# Patient Record
Sex: FEMALE | Race: WHITE | NOT HISPANIC OR LATINO | Employment: OTHER | URBAN - METROPOLITAN AREA
[De-identification: names, ages, dates, MRNs, and addresses within clinical notes are randomized per-mention and may not be internally consistent; named-entity substitution may affect disease eponyms.]

---

## 2017-05-10 ENCOUNTER — TRANSCRIBE ORDERS (OUTPATIENT)
Dept: ADMINISTRATIVE | Facility: HOSPITAL | Age: 67
End: 2017-05-10

## 2017-05-10 DIAGNOSIS — R19.7 DIARRHEA, UNSPECIFIED TYPE: ICD-10-CM

## 2017-05-10 DIAGNOSIS — R10.84 ABDOMINAL PAIN, GENERALIZED: Primary | ICD-10-CM

## 2017-05-10 DIAGNOSIS — K62.5 RECTAL BLEEDING: ICD-10-CM

## 2017-05-19 ENCOUNTER — HOSPITAL ENCOUNTER (OUTPATIENT)
Dept: CT IMAGING | Facility: HOSPITAL | Age: 67
Discharge: HOME | End: 2017-05-19
Attending: INTERNAL MEDICINE | Admitting: INTERNAL MEDICINE

## 2017-05-19 ENCOUNTER — APPOINTMENT (OUTPATIENT)
Dept: CT IMAGING | Facility: HOSPITAL | Age: 67
End: 2017-05-19
Attending: INTERNAL MEDICINE

## 2017-05-19 DIAGNOSIS — R19.7 DIARRHEA, UNSPECIFIED TYPE: ICD-10-CM

## 2017-05-19 DIAGNOSIS — K62.5 RECTAL BLEEDING: ICD-10-CM

## 2017-05-19 DIAGNOSIS — R10.84 ABDOMINAL PAIN, GENERALIZED: ICD-10-CM

## 2017-05-19 PROCEDURE — 74176 CT ABD & PELVIS W/O CONTRAST: CPT

## 2017-05-19 PROCEDURE — 82565 ASSAY OF CREATININE: CPT

## 2017-05-23 LAB — CREAT BLDA-MCNC: 2.6 MG/DL (ref 0.6–1.3)

## 2018-05-06 ENCOUNTER — HOSPITAL ENCOUNTER (EMERGENCY)
Facility: HOSPITAL | Age: 68
Discharge: HOME | End: 2018-05-06
Attending: EMERGENCY MEDICINE | Admitting: EMERGENCY MEDICINE

## 2018-05-06 VITALS
SYSTOLIC BLOOD PRESSURE: 182 MMHG | OXYGEN SATURATION: 96 % | WEIGHT: 287 LBS | BODY MASS INDEX: 46.12 KG/M2 | RESPIRATION RATE: 18 BRPM | HEART RATE: 75 BPM | DIASTOLIC BLOOD PRESSURE: 73 MMHG | HEIGHT: 66 IN | TEMPERATURE: 98.2 F

## 2018-05-06 DIAGNOSIS — M54.31 SCIATICA OF RIGHT SIDE: Primary | ICD-10-CM

## 2018-05-06 PROCEDURE — 99283 EMERGENCY DEPT VISIT LOW MDM: CPT

## 2018-05-06 RX ORDER — SENNOSIDES 8.6 MG
650 CAPSULE ORAL 2 TIMES DAILY
COMMUNITY

## 2018-05-06 RX ORDER — METHOCARBAMOL 750 MG/1
750 TABLET, FILM COATED ORAL ONCE
Status: COMPLETED | OUTPATIENT
Start: 2018-05-06 | End: 2018-05-06

## 2018-05-06 RX ORDER — FAMOTIDINE 20 MG/1
20 TABLET, FILM COATED ORAL DAILY
COMMUNITY

## 2018-05-06 RX ORDER — LISINOPRIL AND HYDROCHLOROTHIAZIDE 25; 20 MG/1; MG/1
1 TABLET ORAL DAILY
COMMUNITY
End: 2019-02-25

## 2018-05-06 RX ORDER — INSULIN GLARGINE 100 [IU]/ML
10 INJECTION, SOLUTION SUBCUTANEOUS NIGHTLY
COMMUNITY
End: 2018-06-26 | Stop reason: SDUPTHER

## 2018-05-06 RX ORDER — METHOCARBAMOL 750 MG/1
750 TABLET, FILM COATED ORAL 3 TIMES DAILY
Qty: 15 TABLET | Refills: 0 | Status: SHIPPED | OUTPATIENT
Start: 2018-05-06 | End: 2018-05-11

## 2018-05-06 RX ORDER — OXYCODONE HYDROCHLORIDE AND ACETAMINOPHEN 5; 325 MG/1; MG/1
1 TABLET ORAL EVERY 4 HOURS PRN
Qty: 15 TABLET | Refills: 0 | Status: SHIPPED | OUTPATIENT
Start: 2018-05-06 | End: 2018-05-29

## 2018-05-06 RX ORDER — GABAPENTIN 100 MG/1
100 CAPSULE ORAL DAILY
COMMUNITY
End: 2018-05-29 | Stop reason: SINTOL

## 2018-05-06 RX ORDER — OXYCODONE HYDROCHLORIDE AND ACETAMINOPHEN 5; 325 MG/1; MG/1
1 TABLET ORAL ONCE
Status: COMPLETED | OUTPATIENT
Start: 2018-05-06 | End: 2018-05-06

## 2018-05-06 RX ADMIN — OXYCODONE HYDROCHLORIDE AND ACETAMINOPHEN 1 TABLET: 5; 325 TABLET ORAL at 05:22

## 2018-05-06 RX ADMIN — METHOCARBAMOL 750 MG: 750 TABLET ORAL at 05:22

## 2018-05-06 NOTE — DISCHARGE INSTRUCTIONS
Patient will be advised to apply heat to low back and perform stretching, and regular walking.     Take robaxin and percocet as needed for muscle spasm and pain.     Take ibuprofen 600 mg every 6 hours for next 3 days.     Follow-up with Dr. Schaffer on outpatient basis.

## 2018-05-06 NOTE — ED PROVIDER NOTES
Subjective   67-year-old female presents with a complaint of right lower back pain with radiation of pain down the posterior leg to the foot.  She reports tingling/numbness in the right medial foot.  She reports her strength of her right lower extremity has been intact and unchanged from baseline.   In fact she states that standing and ambulating has helped to improve the pain at times.  She reports movement and lying flat affect worsen the pain time.  She denies any bowel or urinary incontinence or retention.  No recent trauma to the back.  No previous surgery to the low back.  No recent fever, no history of cancer or unexpected weight change.  Reports a previous history of sciatica in the past.  No other complaints.  Patient appears well.        Back Pain   Location:  Lumbar spine  Quality:  Aching  Radiates to:  R foot  Pain severity:  Moderate  Pain is:  Same all the time  Onset quality:  Sudden  Duration:  1 week  Timing:  Intermittent  Progression:  Waxing and waning  Chronicity:  Recurrent  Context: not falling, not lifting heavy objects, not physical stress, not recent illness and not recent injury    Relieved by: walking, certain positions.  Worsened by:  Bending, movement and lying down  Ineffective treatments:  None tried  Associated symptoms: leg pain and tingling    Associated symptoms: no abdominal pain, no abdominal swelling, no bladder incontinence, no bowel incontinence, no chest pain, no dysuria, no fever, no headaches, no paresthesias, no perianal numbness, no weakness and no weight loss    Risk factors: lack of exercise and obesity    Risk factors: no hx of cancer and no recent surgery        Review of Systems   Constitutional: Negative for chills, fatigue, fever and weight loss.   HENT: Negative for congestion, ear pain, postnasal drip, sinus pressure and sore throat.    Eyes: Negative for pain, redness and visual disturbance.   Respiratory: Negative for cough, chest tightness and shortness of  "breath.    Cardiovascular: Negative for chest pain, palpitations and leg swelling.   Gastrointestinal: Negative for abdominal pain, anal bleeding, blood in stool, bowel incontinence, diarrhea, nausea and vomiting.   Endocrine: Negative for polydipsia and polyuria.   Genitourinary: Negative for bladder incontinence, difficulty urinating, dysuria, flank pain, frequency and urgency.   Musculoskeletal: Positive for back pain. Negative for arthralgias and neck pain.   Skin: Negative for pallor and rash.   Allergic/Immunologic: Negative for environmental allergies and immunocompromised state.   Neurological: Positive for tingling. Negative for dizziness, weakness, headaches and paresthesias.   Hematological: Negative for adenopathy.   Psychiatric/Behavioral: Negative for confusion, self-injury and suicidal ideas. The patient is not nervous/anxious.    All other systems reviewed and are negative.      Past Medical History:   Diagnosis Date   • Anemia    • Chronic pain in left shoulder    • Diabetes mellitus    • Gout    • Heart disease    • Hyperlipidemia    • Hypertension    • IBS (irritable bowel syndrome)    • Kidney stone    • Obesity    • PHYLLIS on CPAP    • Osteoarthritis of right elbow    • Osteoporosis    • Primary osteoarthritis of left knee    • Rotator cuff tear    • SLE (systemic lupus erythematosus)    • SOB (shortness of breath)        Allergies   Allergen Reactions   • Duract [Bromfenac] Hives   • Duricef [Cefadroxil] Hives   • Erythromycin Hives   • Lamisil [Terbinafine Hcl] Other (See Comments)     \"IT CAUSED LUPUS\"   • Lasix [Furosemide] Other (See Comments)     PT STATES \"IM JUST VERY SENSITIVE TO IT.\"   • Mevacor [Lovastatin] Other (See Comments)     PT REPORTS \"IT JUST MADE ME FEEL SICK, I COULDN'T TOLERATE IT NOTHING SPECIFIC.\"   • Other Other (See Comments)     LAMOSIL CAUSED DRUG INDUCED LUPUS PER PATIENT   • Sulfa Antibiotics Hives   • Sulfanilamide    • Zocor [Simvastatin] Other (See Comments)     " "\"IT JUST MADE ME FEEL SICK, I COULDN'T TOLERATE IT. NOTHING SPECIFIC.\"         Past Surgical History:   Procedure Laterality Date   • APPENDECTOMY     • CATARACT EXTRACTION     • CHOLECYSTECTOMY     • HYSTERECTOMY         Family History   Problem Relation Age of Onset   • Stroke Mother    • Heart disease Mother    • Hypertension Mother    • Heart attack Mother    • Deep vein thrombosis Mother    • Osteoarthritis Mother    • Stroke Father    • Heart disease Father    • Hypertension Father    • Heart attack Father    • Cancer Other    • Diabetes Other    • Heart disease Other    • Hypertension Other    • Heart disease Other    • Hypertension Other    • Heart attack Other        Social History     Social History   • Marital status:      Social History Main Topics   • Smoking status: Never Smoker   • Smokeless tobacco: Never Used   • Alcohol use No   • Drug use: No   • Sexual activity: Defer     Other Topics Concern   • Not on file           Objective   Physical Exam   Constitutional: She is oriented to person, place, and time. She appears well-developed and well-nourished.  Non-toxic appearance. No distress.   HENT:   Head: Normocephalic and atraumatic.   Right Ear: External ear normal.   Left Ear: External ear normal.   Nose: Nose normal.   Eyes: EOM and lids are normal. Pupils are equal, round, and reactive to light.   Neck: Normal range of motion. Neck supple. No tracheal deviation present.   Cardiovascular: Normal rate, regular rhythm and normal heart sounds.  Exam reveals no gallop, no friction rub and no decreased pulses.    No murmur heard.  Pulmonary/Chest: Effort normal and breath sounds normal. No respiratory distress. She has no decreased breath sounds. She has no wheezes. She has no rhonchi. She has no rales.   Abdominal: Soft. Normal appearance and bowel sounds are normal. There is no tenderness. There is no rebound and no guarding.   Musculoskeletal: Normal range of motion. She exhibits no " deformity.        Lumbar back: She exhibits tenderness and pain. She exhibits no bony tenderness, no swelling and no deformity.        Back:    Lymphadenopathy:     She has no cervical adenopathy.   Neurological: She is alert and oriented to person, place, and time. She has normal strength. No cranial nerve deficit or sensory deficit.   Skin: Skin is warm and dry. No rash noted. She is not diaphoretic.   Psychiatric: She has a normal mood and affect. Her speech is normal and behavior is normal. Judgment and thought content normal. Cognition and memory are normal.   Nursing note and vitals reviewed.      Procedures           ED Course  ED Course                  MDM  Number of Diagnoses or Management Options  Sciatica of right side: new and requires workup  Diagnosis management comments: Patient will be discharged with advise to apply heat, perform regular stretching, light activity such as walking, and avoid heavy lifting or strenuous activity.     Will DC with robaxin and percocet for pain.      Patient advised to follow-up with PCP for repeat evaluation in 1 week.        Amount and/or Complexity of Data Reviewed  Obtain history from someone other than the patient: yes  Review and summarize past medical records: yes  Independent visualization of images, tracings, or specimens: yes    Patient Progress  Patient progress: stable        Final diagnoses:   Sciatica of right side            Amy Darden MD  05/06/18 0700

## 2018-05-27 ENCOUNTER — APPOINTMENT (OUTPATIENT)
Dept: GENERAL RADIOLOGY | Facility: HOSPITAL | Age: 68
End: 2018-05-27

## 2018-05-27 ENCOUNTER — HOSPITAL ENCOUNTER (EMERGENCY)
Facility: HOSPITAL | Age: 68
Discharge: HOME | End: 2018-05-27
Attending: EMERGENCY MEDICINE | Admitting: EMERGENCY MEDICINE

## 2018-05-27 VITALS
HEIGHT: 66 IN | OXYGEN SATURATION: 90 % | DIASTOLIC BLOOD PRESSURE: 69 MMHG | TEMPERATURE: 98.2 F | RESPIRATION RATE: 18 BRPM | BODY MASS INDEX: 45.48 KG/M2 | SYSTOLIC BLOOD PRESSURE: 151 MMHG | HEART RATE: 81 BPM | WEIGHT: 283 LBS

## 2018-05-27 DIAGNOSIS — R06.09 DYSPNEA ON EFFORT: Primary | ICD-10-CM

## 2018-05-27 DIAGNOSIS — N28.9 RENAL INSUFFICIENCY: ICD-10-CM

## 2018-05-27 LAB
ALBUMIN SERPL-MCNC: 4.5 G/DL (ref 3.2–4.8)
ALBUMIN/GLOB SERPL: 1.5 G/DL (ref 1.5–2.5)
ALP SERPL-CCNC: 33 U/L (ref 25–100)
ALT SERPL W P-5'-P-CCNC: 16 U/L (ref 7–40)
ANION GAP SERPL CALCULATED.3IONS-SCNC: 10 MMOL/L (ref 3–11)
AST SERPL-CCNC: 24 U/L (ref 0–33)
BASOPHILS # BLD AUTO: 0.02 10*3/MM3 (ref 0–0.2)
BASOPHILS NFR BLD AUTO: 0.3 % (ref 0–1)
BILIRUB SERPL-MCNC: 0.4 MG/DL (ref 0.3–1.2)
BNP SERPL-MCNC: 27 PG/ML (ref 0–100)
BUN BLD-MCNC: 42 MG/DL (ref 9–23)
BUN/CREAT SERPL: 23.3 (ref 7–25)
CALCIUM SPEC-SCNC: 10.7 MG/DL (ref 8.7–10.4)
CHLORIDE SERPL-SCNC: 104 MMOL/L (ref 99–109)
CO2 SERPL-SCNC: 26 MMOL/L (ref 20–31)
CREAT BLD-MCNC: 1.8 MG/DL (ref 0.6–1.3)
DEPRECATED RDW RBC AUTO: 44 FL (ref 37–54)
EOSINOPHIL # BLD AUTO: 0.09 10*3/MM3 (ref 0–0.3)
EOSINOPHIL NFR BLD AUTO: 1.2 % (ref 0–3)
ERYTHROCYTE [DISTWIDTH] IN BLOOD BY AUTOMATED COUNT: 13 % (ref 11.3–14.5)
GFR SERPL CREATININE-BSD FRML MDRD: 28 ML/MIN/1.73
GLOBULIN UR ELPH-MCNC: 3 GM/DL
GLUCOSE BLD-MCNC: 160 MG/DL (ref 70–100)
HCT VFR BLD AUTO: 35.2 % (ref 34.5–44)
HGB BLD-MCNC: 11.4 G/DL (ref 11.5–15.5)
HOLD SPECIMEN: NORMAL
HOLD SPECIMEN: NORMAL
IMM GRANULOCYTES # BLD: 0.05 10*3/MM3 (ref 0–0.03)
IMM GRANULOCYTES NFR BLD: 0.7 % (ref 0–0.6)
LYMPHOCYTES # BLD AUTO: 1.25 10*3/MM3 (ref 0.6–4.8)
LYMPHOCYTES NFR BLD AUTO: 16.8 % (ref 24–44)
MCH RBC QN AUTO: 30.3 PG (ref 27–31)
MCHC RBC AUTO-ENTMCNC: 32.4 G/DL (ref 32–36)
MCV RBC AUTO: 93.6 FL (ref 80–99)
MONOCYTES # BLD AUTO: 0.44 10*3/MM3 (ref 0–1)
MONOCYTES NFR BLD AUTO: 5.9 % (ref 0–12)
NEUTROPHILS # BLD AUTO: 5.64 10*3/MM3 (ref 1.5–8.3)
NEUTROPHILS NFR BLD AUTO: 75.8 % (ref 41–71)
PLATELET # BLD AUTO: 231 10*3/MM3 (ref 150–450)
PMV BLD AUTO: 11 FL (ref 6–12)
POTASSIUM BLD-SCNC: 4.2 MMOL/L (ref 3.5–5.5)
PROT SERPL-MCNC: 7.5 G/DL (ref 5.7–8.2)
RBC # BLD AUTO: 3.76 10*6/MM3 (ref 3.89–5.14)
SODIUM BLD-SCNC: 140 MMOL/L (ref 132–146)
TROPONIN I SERPL-MCNC: 0 NG/ML (ref 0–0.07)
WBC NRBC COR # BLD: 7.44 10*3/MM3 (ref 3.5–10.8)
WHOLE BLOOD HOLD SPECIMEN: NORMAL
WHOLE BLOOD HOLD SPECIMEN: NORMAL

## 2018-05-27 PROCEDURE — 80053 COMPREHEN METABOLIC PANEL: CPT | Performed by: EMERGENCY MEDICINE

## 2018-05-27 PROCEDURE — 99284 EMERGENCY DEPT VISIT MOD MDM: CPT

## 2018-05-27 PROCEDURE — 84484 ASSAY OF TROPONIN QUANT: CPT

## 2018-05-27 PROCEDURE — 93005 ELECTROCARDIOGRAM TRACING: CPT | Performed by: EMERGENCY MEDICINE

## 2018-05-27 PROCEDURE — 85025 COMPLETE CBC W/AUTO DIFF WBC: CPT | Performed by: EMERGENCY MEDICINE

## 2018-05-27 PROCEDURE — 83880 ASSAY OF NATRIURETIC PEPTIDE: CPT | Performed by: EMERGENCY MEDICINE

## 2018-05-27 PROCEDURE — 71045 X-RAY EXAM CHEST 1 VIEW: CPT

## 2018-05-27 RX ORDER — SODIUM CHLORIDE 0.9 % (FLUSH) 0.9 %
10 SYRINGE (ML) INJECTION AS NEEDED
Status: DISCONTINUED | OUTPATIENT
Start: 2018-05-27 | End: 2018-05-27 | Stop reason: HOSPADM

## 2018-05-29 ENCOUNTER — DOCUMENTATION (OUTPATIENT)
Dept: NEUROSURGERY | Facility: CLINIC | Age: 68
End: 2018-05-29

## 2018-05-29 ENCOUNTER — OFFICE VISIT (OUTPATIENT)
Dept: NEUROSURGERY | Facility: CLINIC | Age: 68
End: 2018-05-29

## 2018-05-29 VITALS
TEMPERATURE: 98.6 F | DIASTOLIC BLOOD PRESSURE: 70 MMHG | HEIGHT: 66 IN | WEIGHT: 287 LBS | SYSTOLIC BLOOD PRESSURE: 138 MMHG | BODY MASS INDEX: 46.12 KG/M2

## 2018-05-29 DIAGNOSIS — M53.3 CHRONIC RIGHT SI JOINT PAIN: ICD-10-CM

## 2018-05-29 DIAGNOSIS — G89.29 CHRONIC RIGHT SI JOINT PAIN: ICD-10-CM

## 2018-05-29 DIAGNOSIS — M79.604 RIGHT LEG PAIN: Primary | ICD-10-CM

## 2018-05-29 DIAGNOSIS — M79.671 RIGHT FOOT PAIN: ICD-10-CM

## 2018-05-29 DIAGNOSIS — IMO0001 CLASS 3 OBESITY WITH SERIOUS COMORBIDITY AND BODY MASS INDEX (BMI) OF 45.0 TO 49.9 IN ADULT, UNSPECIFIED OBESITY TYPE: ICD-10-CM

## 2018-05-29 DIAGNOSIS — E11.49 TYPE 2 DIABETES MELLITUS WITH OTHER NEUROLOGIC COMPLICATION, WITHOUT LONG-TERM CURRENT USE OF INSULIN (HCC): ICD-10-CM

## 2018-05-29 DIAGNOSIS — M81.0 OSTEOPOROSIS WITHOUT CURRENT PATHOLOGICAL FRACTURE, UNSPECIFIED OSTEOPOROSIS TYPE: ICD-10-CM

## 2018-05-29 PROBLEM — E11.9 DIABETES MELLITUS: Status: ACTIVE | Noted: 2018-05-29

## 2018-05-29 PROBLEM — M10.9 GOUT: Status: ACTIVE | Noted: 2018-05-29

## 2018-05-29 PROBLEM — E66.9 OBESITY: Status: ACTIVE | Noted: 2018-05-29

## 2018-05-29 PROCEDURE — 99203 OFFICE O/P NEW LOW 30 MIN: CPT | Performed by: PHYSICIAN ASSISTANT

## 2018-05-29 RX ORDER — CYCLOBENZAPRINE HCL 10 MG
10 TABLET ORAL 3 TIMES DAILY PRN
COMMUNITY

## 2018-05-29 RX ORDER — PREGABALIN 75 MG/1
75 CAPSULE ORAL 2 TIMES DAILY
COMMUNITY
End: 2018-07-19 | Stop reason: SDUPTHER

## 2018-05-29 NOTE — PROGRESS NOTES
Subjective   Patient: Lissett Lowry  : 1950  Chart #: [id number]    Date of Service: 2018    Chief Complaint   Patient presents with   • Back Pain   • Leg Pain     HPI  66 yo WF with multiple co-morbidities presentedTo the emergency department on 2018 with complaints of back pain radiating down the posterior right leg into the foot. No studies were performed, she was send home with stretching excises, Robaxin and Percocet and instructed on heat application to the right SI joint area.  She was to follow up with her primary care physician in one week.  She presents to our office today with ongoing symptoms that have changed in nature.  She reports that the pain down her leg has improved significantly since her visit to the emergency room on 2018.  Most of her pain now is located on the medial aspect of the bottom of her right foot it involves a portion of her heel arch and the base of her great toe and into the great toe.  She describes numbness and tingling into the foot and especially the right great toe, she offers symptoms of coldness and and burning.  Her symptoms are worse if she does not have on a shoe.  She occasionally gets some twitching into the right foot, she has been taking Flexeril 10 mg half to 1 at bedtime and reports that this has helped her symptoms.  She denies any weakness in the leg, denies bladder or bowel problems.  Walking and standing make symptoms worse, sitting and laying improves her symptoms.  She has recently been seen back in the emergency room on 2018 with shortness of breath and dyspnea on exertion, she is currently awaiting follow-up with cardiology for stress testing. Her activities are very sedentary especially now with the SOB/CURRIE. Patient reports her glucoses at home are on the average of 140 to 150, she is unsure of her hemoglobin A1c.  She does have severe chronic renal dysfunction stage III or 4, the patient is unsure.  The patient reports that she  "has not had much of an appetite lately and has lost 10 pounds according to her home scale.  Patient does have a history of gout.    Past Medical History:   Diagnosis Date   • Anemia    • Chronic pain in left shoulder    • Diabetes mellitus    • Gout    • Heart disease    • Hyperlipidemia    • Hypertension    • IBS (irritable bowel syndrome)    • Kidney stone    • Obesity    • PHYLLIS on CPAP    • Osteoarthritis of right elbow    • Osteoporosis    • Primary osteoarthritis of left knee    • Rotator cuff tear    • SLE (systemic lupus erythematosus)    • SOB (shortness of breath)        Allergies   Allergen Reactions   • Duract [Bromfenac] Hives   • Duricef [Cefadroxil] Hives   • Erythromycin Hives   • Lamisil [Terbinafine Hcl] Other (See Comments)     \"IT CAUSED LUPUS\"   • Lasix [Furosemide] Other (See Comments)     PT STATES \"IM JUST VERY SENSITIVE TO IT.\"   • Mevacor [Lovastatin] Other (See Comments)     PT REPORTS \"IT JUST MADE ME FEEL SICK, I COULDN'T TOLERATE IT NOTHING SPECIFIC.\"   • Other Other (See Comments)     LAMOSIL CAUSED DRUG INDUCED LUPUS PER PATIENT   • Sulfa Antibiotics Hives   • Sulfanilamide    • Zocor [Simvastatin] Other (See Comments)     \"IT JUST MADE ME FEEL SICK, I COULDN'T TOLERATE IT. NOTHING SPECIFIC.\"           Current Outpatient Prescriptions:   •  acetaminophen (TYLENOL 8 HOUR ARTHRITIS PAIN) 650 MG 8 hr tablet, Take 650 mg by mouth Every 8 (Eight) Hours As Needed for Mild Pain ., Disp: , Rfl:   •  AMLODIPINE BESYLATE PO, Take 5 mg by mouth Daily., Disp: , Rfl:   •  BH IP RX LISINOPRIL/HCTZ COMBO PANEL, , Disp: , Rfl:   •  Bumetanide (BUMEX PO), Take 1 mg by mouth 2 (Two) Times a Day. PT STATES SHE FORGETS TO TAKE IT TWICE, USUALLY TAKES IT ONLY ONCE DAILY, Disp: , Rfl:   •  Exenatide ER (BYDUREON) 2 MG Suspension Reconstituted ER, Inject  under the skin 1 (One) Time Per Week., Disp: , Rfl:   •  famotidine (PEPCID) 20 MG tablet, Take 20 mg by mouth Daily., Disp: , Rfl:   •  fenofibrate " micronized (LOFIBRA) 134 MG capsule, Take  by mouth Daily., Disp: , Rfl:   •  gabapentin (NEURONTIN) 100 MG capsule, Take 100 mg by mouth Daily., Disp: , Rfl:   •  insulin glargine (LANTUS) 100 UNIT/ML injection, Inject 10 Units under the skin Every Night., Disp: , Rfl:   •  linagliptin (TRADJENTA) 5 MG tablet tablet, Take  by mouth Daily., Disp: , Rfl:   •  lisinopril-hydrochlorothiazide (PRINZIDE,ZESTORETIC) 20-25 MG per tablet, Take 1 tablet by mouth Daily., Disp: , Rfl:   •  Meloxicam (MOBIC PO), Take 7.5 mg by mouth As Needed., Disp: , Rfl:   •  metoprolol tartrate (LOPRESSOR) 25 MG tablet, Take 25 mg by mouth Daily., Disp: , Rfl:   •  pioglitazone (ACTOS) 45 MG tablet, Take  by mouth Daily., Disp: , Rfl:   •  rosuvastatin (CRESTOR) 5 MG tablet, Take  by mouth Daily., Disp: , Rfl:   •  sertraline (ZOLOFT) 100 MG tablet, Take  by mouth Daily., Disp: , Rfl:   •  triamterene-hydrochlorothiazide (DYAZIDE) 37.5-25 MG per capsule, Take  by mouth Take As Directed., Disp: , Rfl:   •  spironolactone (ALDACTONE) 25 MG tablet, Take  by mouth Take As Directed., Disp: , Rfl:     Social History     Social History   • Marital status:      Social History Main Topics   • Smoking status: Never Smoker   • Smokeless tobacco: Never Used   • Alcohol use No   • Drug use: No   • Sexual activity: Defer     Other Topics Concern   • Not on file       Family History   Problem Relation Age of Onset   • Stroke Mother    • Heart disease Mother    • Hypertension Mother    • Heart attack Mother    • Deep vein thrombosis Mother    • Osteoarthritis Mother    • Stroke Father    • Heart disease Father    • Hypertension Father    • Heart attack Father    • Cancer Other    • Diabetes Other    • Heart disease Other    • Hypertension Other    • Heart disease Other    • Hypertension Other    • Heart attack Other        Review of Systems   Constitutional: Positive for fatigue. Negative for activity change, appetite change, chills, diaphoresis,  fever and unexpected weight change.   HENT: Negative for congestion, dental problem, drooling, ear discharge, ear pain, facial swelling, hearing loss, mouth sores, nosebleeds, postnasal drip, rhinorrhea, sinus pressure, sneezing, sore throat, tinnitus, trouble swallowing and voice change.    Eyes: Negative for photophobia, pain, discharge, redness, itching and visual disturbance.   Respiratory: Positive for apnea and shortness of breath. Negative for cough, choking, chest tightness, wheezing and stridor.    Cardiovascular: Negative for chest pain, palpitations and leg swelling.   Gastrointestinal: Negative for abdominal distention, abdominal pain, anal bleeding, blood in stool, constipation, diarrhea, nausea, rectal pain and vomiting.   Endocrine: Negative for cold intolerance, heat intolerance, polydipsia, polyphagia and polyuria.   Genitourinary: Negative for decreased urine volume, difficulty urinating, dysuria, enuresis, flank pain, frequency, genital sores, hematuria and urgency.   Musculoskeletal: Positive for arthralgias and gait problem. Negative for back pain, joint swelling, myalgias, neck pain and neck stiffness.   Skin: Negative for color change, pallor, rash and wound.   Allergic/Immunologic: Negative for environmental allergies, food allergies and immunocompromised state.   Neurological: Positive for weakness and numbness. Negative for dizziness, tremors, seizures, syncope, facial asymmetry, speech difficulty, light-headedness and headaches.   Hematological: Negative for adenopathy. Does not bruise/bleed easily.   Psychiatric/Behavioral: Positive for dysphoric mood. Negative for agitation, behavioral problems, confusion, decreased concentration, hallucinations, self-injury, sleep disturbance and suicidal ideas. The patient is not nervous/anxious and is not hyperactive.          Physical examination:  Blood pressure 138/70, temperature 98.6 °F (37 °C), temperature source Temporal Artery , height 167.6  "cm (66\"), weight 130 kg (287 lb).  HEENT: sclera clear, gums clear  Lungs: normal expansion  COR: RRR  ABD: obese  EXT: 1+ edema bilaterally, I am unable to palpate pulses, feet are warm. Capillary refill +    Neurologic Exam     Mental Status   Oriented to person, place, and time.   Attention: normal.   Speech: speech is normal   Level of consciousness: alert  Knowledge: good.     Motor Exam   Muscle bulk: normal  Overall muscle tone: normal    Strength   Right quadriceps: 4/5  Left quadriceps: 4/5  Right hamstrin/5  Left hamstrin/5  Right glutei: 4/5  Left glutei: 4/5  Right anterior tibial: 4/5  Left anterior tibial: 4/5  Right posterior tibial: 4/5  Left posterior tibial: 4/5  Right peroneal: 4/5  Left peroneal: 4/5  Right gastroc: 4/5  Left gastroc: 4/5    Sensory Exam   Light touch normal.     Gait, Coordination, and Reflexes     Gait  Gait: normal    Reflexes   Right patellar: 1+  Left patellar: 1+  Right achilles: 0  Left achilles: 0  SLR is -  Hip rotation causes some right SI joint discomfort.  The right great toe  is not swollen or painful with range of motion.    Radiographic Imaging: no new studies    Laboratory data: reviewed Epic data from ED visit    Medical Decision Making: pleasant 67-year-old female with long-standing diabetes, chronic renal disease, hypertension, hyperlipidemia, obesity, osteoarthritis, osteoporosis, lupus, now recently being worked up for heart disease and shortness of breath and dyspnea on exertion with walking short distances.  She is scheduled to see cardiology for stress testing in the near future.  The patient has had an acute onset of low back pain that radiated down the right leg and has now remained in the bottom of the right foot and into the right great toe.  The symptoms with which she presents could be related to SI joint dysfunction and her right great toe symptoms may be an exacerbation of her previous gout.  With her long-standing diabetes peripheral " neuropathy would need to be in the differential.  We are going to proceed with EMG and nerve conduction study of the right lower extremity and she will return to see us after that is completed.  We will wait until those results until making any further recommendations at this time.  The patient is currently on Neurontin 100 mg and is having some difficulties with this dose of sleepiness, she did take a 300 mg dose and had confusion and sleepiness.  We have recommended switching her to Lyrica 75 mg twice a day and she and her spouse are both in agreement.  They're also agreeing with plan for nerve conduction study.    Amy Orr PA-C    Patient Care Team:  Konstantin Gibson MD as PCP - General (Family Medicine)  Konstantin Gibson MD as Referring Physician (Family Medicine)

## 2018-06-19 ENCOUNTER — OFFICE VISIT (OUTPATIENT)
Dept: NEUROSURGERY | Facility: CLINIC | Age: 68
End: 2018-06-19

## 2018-06-19 VITALS
SYSTOLIC BLOOD PRESSURE: 140 MMHG | WEIGHT: 293 LBS | TEMPERATURE: 99.4 F | DIASTOLIC BLOOD PRESSURE: 80 MMHG | BODY MASS INDEX: 47.09 KG/M2 | HEIGHT: 66 IN

## 2018-06-19 DIAGNOSIS — M51.36 DDD (DEGENERATIVE DISC DISEASE), LUMBAR: Primary | ICD-10-CM

## 2018-06-19 DIAGNOSIS — E11.49 TYPE 2 DIABETES MELLITUS WITH OTHER NEUROLOGIC COMPLICATION, WITHOUT LONG-TERM CURRENT USE OF INSULIN (HCC): ICD-10-CM

## 2018-06-19 DIAGNOSIS — M54.50 CHRONIC BILATERAL LOW BACK PAIN WITHOUT SCIATICA: ICD-10-CM

## 2018-06-19 DIAGNOSIS — M79.671 RIGHT FOOT PAIN: ICD-10-CM

## 2018-06-19 DIAGNOSIS — Z79.4 TYPE 2 DIABETES MELLITUS WITH OTHER DIABETIC KIDNEY COMPLICATION, WITH LONG-TERM CURRENT USE OF INSULIN (HCC): ICD-10-CM

## 2018-06-19 DIAGNOSIS — G89.29 CHRONIC BILATERAL LOW BACK PAIN WITHOUT SCIATICA: ICD-10-CM

## 2018-06-19 DIAGNOSIS — M53.3 CHRONIC RIGHT SI JOINT PAIN: ICD-10-CM

## 2018-06-19 DIAGNOSIS — G89.29 CHRONIC RIGHT SI JOINT PAIN: ICD-10-CM

## 2018-06-19 DIAGNOSIS — M79.604 RIGHT LEG PAIN: ICD-10-CM

## 2018-06-19 DIAGNOSIS — IMO0001 CLASS 3 OBESITY WITH SERIOUS COMORBIDITY AND BODY MASS INDEX (BMI) OF 45.0 TO 49.9 IN ADULT, UNSPECIFIED OBESITY TYPE: ICD-10-CM

## 2018-06-19 DIAGNOSIS — M81.0 OSTEOPOROSIS WITHOUT CURRENT PATHOLOGICAL FRACTURE, UNSPECIFIED OSTEOPOROSIS TYPE: ICD-10-CM

## 2018-06-19 DIAGNOSIS — E11.29 TYPE 2 DIABETES MELLITUS WITH OTHER DIABETIC KIDNEY COMPLICATION, WITH LONG-TERM CURRENT USE OF INSULIN (HCC): ICD-10-CM

## 2018-06-19 PROCEDURE — 99214 OFFICE O/P EST MOD 30 MIN: CPT | Performed by: PHYSICIAN ASSISTANT

## 2018-06-19 NOTE — PROGRESS NOTES
F/U Visit  Subjective   Patient ID: Lissett Lowry is a 67 y.o. female  [id number]    Chief Complaint   Patient presents with   • Leg Pain     History of Present Illness  68 yo WF with multiple co-morbidities presentedTo the emergency department on 5/6/2018 with complaints of back pain radiating down the posterior right leg into the foot. No studies were performed, she was send home with stretching excises, Robaxin and Percocet and instructed on heat application to the right SI joint area.  She was to follow up with her primary care physician in one week.  She presents to our office today with ongoing symptoms that have changed in nature.  She reports that the pain down her leg has improved significantly since her visit to the emergency room on 5/6/2018.  Most of her pain now is located on the medial aspect of the bottom of her right foot it involves a portion of her heel arch and the base of her great toe and into the great toe.  She describes numbness and tingling into the foot and especially the right great toe, she offers symptoms of coldness and and burning.  Her symptoms are worse if she does not have on a shoe.  She occasionally gets some twitching into the right foot, she has been taking Flexeril 10 mg half to 1 at bedtime and reports that this has helped her symptoms.  She denies any weakness in the leg, denies bladder or bowel problems.  Walking and standing make symptoms worse, sitting and laying improves her symptoms.  She has recently been seen back in the emergency room on 5/27/2018 with shortness of breath and dyspnea on exertion, she is currently awaiting follow-up with cardiology for stress testing. Her activities are very sedentary especially now with the SOB/CURRIE. Patient reports her glucoses at home are on the average of 140 to 150, she is unsure of her hemoglobin A1c.  She does have severe chronic renal dysfunction stage III or 4, the patient is unsure.   She has been and had her stress test by  "cardiology which was normal.   In addition to updating the above complaints she is also complaining of memory problems in name recognition,    Confusion, forgetful, dry mouth and increased appetite.  She and her  started discussing her diabetes treatment and they both feel she needs diabetes education and referral to endocrinology.  She's had some complaints of fullness in her head and her primary care obtained a CT scan of the head which  She brought for review.    Past Medical History:   Diagnosis Date   • Anemia    • Chronic pain in left shoulder    • Diabetes mellitus    • Gout    • Heart disease    • Hyperlipidemia    • Hypertension    • IBS (irritable bowel syndrome)    • Kidney stone    • Obesity    • PHYLLIS on CPAP    • Osteoarthritis of right elbow    • Osteoporosis    • Primary osteoarthritis of left knee    • Rotator cuff tear    • SLE (systemic lupus erythematosus)    • SOB (shortness of breath)        Allergies   Allergen Reactions   • Duract [Bromfenac] Hives   • Duricef [Cefadroxil] Hives   • Erythromycin Hives   • Lamisil [Terbinafine Hcl] Other (See Comments)     \"IT CAUSED LUPUS\"   • Lasix [Furosemide] Other (See Comments)     PT STATES \"IM JUST VERY SENSITIVE TO IT.\"   • Mevacor [Lovastatin] Other (See Comments)     PT REPORTS \"IT JUST MADE ME FEEL SICK, I COULDN'T TOLERATE IT NOTHING SPECIFIC.\"   • Other Other (See Comments)     LAMOSIL CAUSED DRUG INDUCED LUPUS PER PATIENT   • Sulfa Antibiotics Hives   • Sulfanilamide    • Zocor [Simvastatin] Other (See Comments)     \"IT JUST MADE ME FEEL SICK, I COULDN'T TOLERATE IT. NOTHING SPECIFIC.\"         Current Outpatient Prescriptions:   •  acetaminophen (TYLENOL 8 HOUR ARTHRITIS PAIN) 650 MG 8 hr tablet, Take 650 mg by mouth Every 8 (Eight) Hours As Needed for Mild Pain ., Disp: , Rfl:   •  AMLODIPINE BESYLATE PO, Take 5 mg by mouth Daily., Disp: , Rfl:   •  BH IP RX LISINOPRIL/HCTZ COMBO PANEL, , Disp: , Rfl:   •  Bumetanide (BUMEX PO), Take 1 mg " by mouth 2 (Two) Times a Day. PT STATES SHE FORGETS TO TAKE IT TWICE, USUALLY TAKES IT ONLY ONCE DAILY, Disp: , Rfl:   •  cyclobenzaprine (FLEXERIL) 10 MG tablet, Take 10 mg by mouth 3 (Three) Times a Day As Needed for Muscle Spasms., Disp: , Rfl:   •  Exenatide ER (BYDUREON) 2 MG Suspension Reconstituted ER, Inject  under the skin 1 (One) Time Per Week., Disp: , Rfl:   •  famotidine (PEPCID) 20 MG tablet, Take 20 mg by mouth Daily., Disp: , Rfl:   •  fenofibrate micronized (LOFIBRA) 134 MG capsule, Take  by mouth Daily., Disp: , Rfl:   •  insulin glargine (LANTUS) 100 UNIT/ML injection, Inject 10 Units under the skin Every Night., Disp: , Rfl:   •  linagliptin (TRADJENTA) 5 MG tablet tablet, Take  by mouth Daily., Disp: , Rfl:   •  lisinopril-hydrochlorothiazide (PRINZIDE,ZESTORETIC) 20-25 MG per tablet, Take 1 tablet by mouth Daily., Disp: , Rfl:   •  Meloxicam (MOBIC PO), Take 7.5 mg by mouth As Needed., Disp: , Rfl:   •  metoprolol tartrate (LOPRESSOR) 25 MG tablet, Take 25 mg by mouth Daily., Disp: , Rfl:   •  pioglitazone (ACTOS) 45 MG tablet, Take  by mouth Daily., Disp: , Rfl:   •  pregabalin (LYRICA) 75 MG capsule, Take 75 mg by mouth 2 (Two) Times a Day., Disp: , Rfl:   •  rosuvastatin (CRESTOR) 5 MG tablet, Take  by mouth Daily., Disp: , Rfl:   •  sertraline (ZOLOFT) 100 MG tablet, Take  by mouth Daily., Disp: , Rfl:   •  spironolactone (ALDACTONE) 25 MG tablet, Take  by mouth Take As Directed., Disp: , Rfl:   Social History   Substance Use Topics   • Smoking status: Never Smoker   • Smokeless tobacco: Never Used   • Alcohol use No     Review of Systems   Constitutional: Negative for activity change, appetite change, chills, diaphoresis, fatigue, fever and unexpected weight change.   HENT: Negative for congestion, dental problem, drooling, ear discharge, ear pain, facial swelling, hearing loss, mouth sores, nosebleeds, postnasal drip, rhinorrhea, sinus pressure, sneezing, sore throat, tinnitus, trouble  swallowing and voice change.    Eyes: Negative for photophobia, pain, discharge, redness, itching and visual disturbance.   Respiratory: Negative for apnea, cough, choking, chest tightness, shortness of breath, wheezing and stridor.    Cardiovascular: Negative for chest pain, palpitations and leg swelling.   Gastrointestinal: Negative for abdominal distention, abdominal pain, anal bleeding, blood in stool, constipation, diarrhea, nausea, rectal pain and vomiting.   Endocrine: Negative for cold intolerance, heat intolerance, polydipsia, polyphagia and polyuria.   Genitourinary: Negative for decreased urine volume, difficulty urinating, dysuria, enuresis, flank pain, frequency, genital sores, hematuria and urgency.   Musculoskeletal: Positive for arthralgias. Negative for back pain, gait problem, joint swelling, myalgias, neck pain and neck stiffness.   Skin: Negative for color change, pallor, rash and wound.   Allergic/Immunologic: Negative for environmental allergies, food allergies and immunocompromised state.   Neurological: Positive for weakness. Negative for dizziness, tremors, seizures, syncope, facial asymmetry, speech difficulty, light-headedness, numbness and headaches.   Hematological: Negative for adenopathy. Does not bruise/bleed easily.   Psychiatric/Behavioral: Negative for agitation, behavioral problems, confusion, decreased concentration, dysphoric mood, hallucinations, self-injury, sleep disturbance and suicidal ideas. The patient is not nervous/anxious and is not hyperactive.        Physical Exam   Constitutional: She is oriented to person, place, and time. She appears well-developed and well-nourished.   HENT:   Head: Normocephalic and atraumatic.   Eyes: Conjunctivae are normal.   Neck: Normal range of motion. No JVD present. Carotid bruit is not present.   Cardiovascular: Normal rate.    Pulmonary/Chest: Effort normal.   Musculoskeletal: Normal range of motion.   Neurological: She is alert and  "oriented to person, place, and time.   Skin: Skin is warm and dry.   Psychiatric: She has a normal mood and affect. Her behavior is normal. Judgment and thought content normal.     /80 (BP Location: Right arm, Patient Position: Sitting)   Temp 99.4 °F (37.4 °C) (Temporal Artery )   Ht 167.6 cm (65.98\")   Wt 133 kg (293 lb 9.6 oz)   BMI 47.41 kg/m²     Neurologic Exam     Mental Status   Oriented to person, place, and time.     Independent Review of Radiographic Studies:   CT head without hemorrhage, cisterna magna is noted on the left.    Medical Decision Making:   Lissett has low back pain associated with degenerative osteoarthritis, EMG and nerve conduction study is consistent with a mild acute and chronic right L5 radiculopathy which corresponds to her right leg pain.  We are going to go ahead and treat this conservatively and she is going to attend physical therapy and pain has and she is going to visit with the diabetes educator at the hospital in [facility].  We have made a referral for her to see a diabetes specialist here in [place].  Our plan will be an MRI of the lumbar spine without contrast in 1 month unless she has improvement in her symptoms with physical therapy and we will cancel or she has worsening symptoms and we will move her appointment up.  In regard to her symptoms of memory loss forgetfulness and confusion we are going to see how she does with better diabetes control because we did discuss that some of her symptoms could be associated with a diabetes or diabetes treatment.  We can discuss referral to neurology for dementia workup if needed.    Amy Orr PA-C                        "

## 2018-06-26 ENCOUNTER — OFFICE VISIT (OUTPATIENT)
Dept: ENDOCRINOLOGY | Facility: CLINIC | Age: 68
End: 2018-06-26

## 2018-06-26 VITALS
SYSTOLIC BLOOD PRESSURE: 128 MMHG | WEIGHT: 293 LBS | BODY MASS INDEX: 47.4 KG/M2 | OXYGEN SATURATION: 98 % | HEART RATE: 75 BPM | DIASTOLIC BLOOD PRESSURE: 80 MMHG

## 2018-06-26 DIAGNOSIS — E11.49 TYPE 2 DIABETES MELLITUS WITH OTHER NEUROLOGIC COMPLICATION, WITHOUT LONG-TERM CURRENT USE OF INSULIN (HCC): Primary | ICD-10-CM

## 2018-06-26 DIAGNOSIS — N18.4 CKD (CHRONIC KIDNEY DISEASE) STAGE 4, GFR 15-29 ML/MIN (HCC): ICD-10-CM

## 2018-06-26 LAB
GLUCOSE BLDC GLUCOMTR-MCNC: 203 MG/DL (ref 70–130)
HBA1C MFR BLD: 7 %

## 2018-06-26 PROCEDURE — 99214 OFFICE O/P EST MOD 30 MIN: CPT | Performed by: PHYSICIAN ASSISTANT

## 2018-06-26 PROCEDURE — 82962 GLUCOSE BLOOD TEST: CPT | Performed by: PHYSICIAN ASSISTANT

## 2018-06-26 PROCEDURE — 82570 ASSAY OF URINE CREATININE: CPT | Performed by: PHYSICIAN ASSISTANT

## 2018-06-26 PROCEDURE — 82043 UR ALBUMIN QUANTITATIVE: CPT | Performed by: PHYSICIAN ASSISTANT

## 2018-06-26 PROCEDURE — 83036 HEMOGLOBIN GLYCOSYLATED A1C: CPT | Performed by: PHYSICIAN ASSISTANT

## 2018-06-26 RX ORDER — INSULIN GLARGINE 100 [IU]/ML
20 INJECTION, SOLUTION SUBCUTANEOUS NIGHTLY
Qty: 10 ML | Refills: 6 | Status: SHIPPED | OUTPATIENT
Start: 2018-06-26 | End: 2018-07-19

## 2018-06-26 NOTE — PROGRESS NOTES
"Chief Complaint  Establish care for Diabetes Mellitus.    HPI   Lissett Lowry is a 67 y.o. female who is here today for evaluation of Diabetes Mellitus type 2.  The initial diagnosis of diabetes was made approximately 2005.    a1c- 7 (6/26/18)  Labs reviewed:  4/2018- LDL 62, , hgb 11.3, hct 36, cr 1.5, gfr 35, b12 >1500, TSH 1.9, vitamin d 35    Diabetic complications: none  Eye exam current (within one year): 6/2018- no retinopathy  Foot care and dental care: discussed    Current diabetic medications include:  actos 45mg qd  tradjenta 5mg qd  lantus 10U QHS - started April 2018   bydureon 2mg sc weekly    Takes lyrica for back pain that radiates to rt leg and foot. No neuropathy. Gabapentin did not help.    Statin: crestor 5    Past medications: po meds- metformin    Diabetic Monitoring  - checks glucose 1-2x/day  Glucose is averaging- -170, -160  Hypoglycemia- no  Home blood sugar records: glucometer downloaded, data reviewed and scanned to chart    Nutrition:     Current diet: in general, an \"unhealthy\" diet, eats lunch and dinner most days. Will sometimes binge eat. Eating out a lot. No sugary drinks.   Current exercise: none due to chronic back pain  Seen RD in past: no    The following portions of the patient's history were reviewed and updated by me as appropriate: allergies, current medications, past family history, past social history, past surgical history and problem list.    Past Medical History:   Diagnosis Date   • Anemia    • Chronic pain in left shoulder    • Diabetes mellitus    • Gout    • Heart disease    • Hyperlipidemia    • Hypertension    • IBS (irritable bowel syndrome)    • Kidney stone    • Obesity    • PHYLLIS on CPAP    • Osteoarthritis of right elbow    • Osteoporosis    • Primary osteoarthritis of left knee    • Rotator cuff tear    • SLE (systemic lupus erythematosus)    • SOB (shortness of breath)        Medications    Current Outpatient Prescriptions:   •  " acetaminophen (TYLENOL 8 HOUR ARTHRITIS PAIN) 650 MG 8 hr tablet, Take 650 mg by mouth Every 8 (Eight) Hours As Needed for Mild Pain ., Disp: , Rfl:   •  AMLODIPINE BESYLATE PO, Take 5 mg by mouth Daily., Disp: , Rfl:   •  Bumetanide (BUMEX PO), Take 1 mg by mouth 2 (Two) Times a Day. PT STATES SHE FORGETS TO TAKE IT TWICE, USUALLY TAKES IT ONLY ONCE DAILY, Disp: , Rfl:   •  cyclobenzaprine (FLEXERIL) 10 MG tablet, Take 10 mg by mouth 3 (Three) Times a Day As Needed for Muscle Spasms., Disp: , Rfl:   •  Exenatide ER (BYDUREON) 2 MG Suspension Reconstituted ER, Inject  under the skin 1 (One) Time Per Week., Disp: , Rfl:   •  famotidine (PEPCID) 20 MG tablet, Take 20 mg by mouth Daily., Disp: , Rfl:   •  fenofibrate micronized (LOFIBRA) 134 MG capsule, Take  by mouth Daily., Disp: , Rfl:   •  insulin glargine (LANTUS) 100 UNIT/ML injection, Inject 20 Units under the skin Every Night., Disp: 10 mL, Rfl: 6  •  linagliptin (TRADJENTA) 5 MG tablet tablet, Take  by mouth Daily., Disp: , Rfl:   •  lisinopril-hydrochlorothiazide (PRINZIDE,ZESTORETIC) 20-25 MG per tablet, Take 1 tablet by mouth Daily., Disp: , Rfl:   •  Meloxicam (MOBIC PO), Take 7.5 mg by mouth As Needed., Disp: , Rfl:   •  metoprolol tartrate (LOPRESSOR) 25 MG tablet, Take 25 mg by mouth Daily., Disp: , Rfl:   •  pioglitazone (ACTOS) 45 MG tablet, Take  by mouth Daily., Disp: , Rfl:   •  pregabalin (LYRICA) 75 MG capsule, Take 75 mg by mouth 2 (Two) Times a Day., Disp: , Rfl:   •  rosuvastatin (CRESTOR) 5 MG tablet, Take  by mouth Daily., Disp: , Rfl:   •  sertraline (ZOLOFT) 100 MG tablet, Take  by mouth Daily., Disp: , Rfl:   •  spironolactone (ALDACTONE) 25 MG tablet, Take  by mouth Take As Directed., Disp: , Rfl:     Review of Systems  Review of Systems   Constitutional: Positive for fatigue. Negative for chills, fever and unexpected weight change.   HENT: Negative for ear pain, hearing loss, nosebleeds, rhinorrhea and sore throat.    Eyes: Negative for  pain, discharge, redness, itching and visual disturbance.   Respiratory: Negative for cough, shortness of breath and wheezing.    Cardiovascular: Negative for chest pain, palpitations and leg swelling.   Gastrointestinal: Negative for abdominal pain, blood in stool, constipation and diarrhea.   Endocrine: Positive for polyphagia. Negative for cold intolerance, heat intolerance and polydipsia.   Genitourinary: Negative for dysuria, frequency, hematuria, pelvic pain, vaginal bleeding and vaginal discharge.   Musculoskeletal: Positive for arthralgias and back pain. Negative for gait problem, joint swelling and myalgias.   Skin: Negative for rash.   Allergic/Immunologic: Negative.    Neurological: Negative for dizziness, syncope, weakness, numbness and headaches.   Hematological: Negative for adenopathy. Does not bruise/bleed easily.   Psychiatric/Behavioral: Negative for sleep disturbance and suicidal ideas. The patient is not nervous/anxious.         Physical Exam    /80   Pulse 75   Wt 133 kg (293 lb 8 oz)   SpO2 98%   BMI 47.40 kg/m² Body mass index is 47.4 kg/m².  Physical Exam   Constitutional: She is oriented to person, place, and time. She appears well-developed. No distress.   HENT:   Head: Normocephalic.   Right Ear: External ear normal.   Left Ear: External ear normal.   Mouth/Throat: No oropharyngeal exudate.   Eyes: Conjunctivae and lids are normal. Right eye exhibits no discharge. Left eye exhibits no discharge. Right pupil is reactive. Left pupil is reactive.   Neck: No JVD present. No tracheal deviation present. No thyroid mass and no thyromegaly present.   Cardiovascular: Normal rate, regular rhythm, normal heart sounds and intact distal pulses.    No murmur heard.  Pulmonary/Chest: Effort normal and breath sounds normal. No respiratory distress. She has no wheezes.   Abdominal: Soft. Bowel sounds are normal. There is no tenderness.   Musculoskeletal: She exhibits no edema or tenderness.     Lissett had a diabetic foot exam performed (no ulcers or calluses) today.   During the foot exam she had a monofilament test performed (loss of sensation 1st 3 digits right foot, intact left foot).  Vascular Status -  Her right foot exhibits normal foot vasculature  and no edema. Her left foot exhibits normal foot vasculature  and no edema.  Skin Integrity  -  Her right foot skin is intact.Her left foot skin is intact..  Lymphadenopathy:     She has no cervical adenopathy.   Neurological: She is alert and oriented to person, place, and time.   Skin: Skin is warm, dry and intact. No rash noted. She is not diaphoretic. No erythema.   Psychiatric: She has a normal mood and affect. Her speech is normal and behavior is normal. Thought content normal.       Labs and Imaging   Lab Results   Component Value Date    HGBA1C 7.0 06/26/2018     Office Visit on 06/26/2018   Component Date Value Ref Range Status   • Glucose 06/26/2018 203* 70 - 130 mg/dL Final   • Hemoglobin A1C 06/26/2018 7.0  % Final   Admission on 05/27/2018, Discharged on 05/27/2018   Component Date Value Ref Range Status   • Glucose 05/27/2018 160* 70 - 100 mg/dL Final   • BUN 05/27/2018 42* 9 - 23 mg/dL Final   • Creatinine 05/27/2018 1.80* 0.60 - 1.30 mg/dL Final   • Sodium 05/27/2018 140  132 - 146 mmol/L Final   • Potassium 05/27/2018 4.2  3.5 - 5.5 mmol/L Final   • Chloride 05/27/2018 104  99 - 109 mmol/L Final   • CO2 05/27/2018 26.0  20.0 - 31.0 mmol/L Final   • Calcium 05/27/2018 10.7* 8.7 - 10.4 mg/dL Final   • Total Protein 05/27/2018 7.5  5.7 - 8.2 g/dL Final   • Albumin 05/27/2018 4.50  3.20 - 4.80 g/dL Final   • ALT (SGPT) 05/27/2018 16  7 - 40 U/L Final   • AST (SGOT) 05/27/2018 24  0 - 33 U/L Final   • Alkaline Phosphatase 05/27/2018 33  25 - 100 U/L Final   • Total Bilirubin 05/27/2018 0.4  0.3 - 1.2 mg/dL Final   • eGFR Non African Amer 05/27/2018 28* >60 mL/min/1.73 Final   • Globulin 05/27/2018 3.0  gm/dL Final   • A/G Ratio 05/27/2018 1.5   1.5 - 2.5 g/dL Final   • BUN/Creatinine Ratio 05/27/2018 23.3  7.0 - 25.0 Final   • Anion Gap 05/27/2018 10.0  3.0 - 11.0 mmol/L Final   • BNP 05/27/2018 27.0  0.0 - 100.0 pg/mL Final    Results may be falsely decreased if patient taking Biotin.   • Extra Tube 05/27/2018 hold for add-on   Final    Auto resulted   • Extra Tube 05/27/2018 Hold for add-ons.   Final    Auto resulted.   • Extra Tube 05/27/2018 hold for add-on   Final    Auto resulted   • Extra Tube 05/27/2018 Hold for add-ons.   Final    Auto resulted.   • WBC 05/27/2018 7.44  3.50 - 10.80 10*3/mm3 Final   • RBC 05/27/2018 3.76* 3.89 - 5.14 10*6/mm3 Final   • Hemoglobin 05/27/2018 11.4* 11.5 - 15.5 g/dL Final   • Hematocrit 05/27/2018 35.2  34.5 - 44.0 % Final   • MCV 05/27/2018 93.6  80.0 - 99.0 fL Final   • MCH 05/27/2018 30.3  27.0 - 31.0 pg Final   • MCHC 05/27/2018 32.4  32.0 - 36.0 g/dL Final   • RDW 05/27/2018 13.0  11.3 - 14.5 % Final   • RDW-SD 05/27/2018 44.0  37.0 - 54.0 fl Final   • MPV 05/27/2018 11.0  6.0 - 12.0 fL Final   • Platelets 05/27/2018 231  150 - 450 10*3/mm3 Final   • Neutrophil % 05/27/2018 75.8* 41.0 - 71.0 % Final   • Lymphocyte % 05/27/2018 16.8* 24.0 - 44.0 % Final   • Monocyte % 05/27/2018 5.9  0.0 - 12.0 % Final   • Eosinophil % 05/27/2018 1.2  0.0 - 3.0 % Final   • Basophil % 05/27/2018 0.3  0.0 - 1.0 % Final   • Immature Grans % 05/27/2018 0.7* 0.0 - 0.6 % Final   • Neutrophils, Absolute 05/27/2018 5.64  1.50 - 8.30 10*3/mm3 Final   • Lymphocytes, Absolute 05/27/2018 1.25  0.60 - 4.80 10*3/mm3 Final   • Monocytes, Absolute 05/27/2018 0.44  0.00 - 1.00 10*3/mm3 Final   • Eosinophils, Absolute 05/27/2018 0.09  0.00 - 0.30 10*3/mm3 Final   • Basophils, Absolute 05/27/2018 0.02  0.00 - 0.20 10*3/mm3 Final   • Immature Grans, Absolute 05/27/2018 0.05* 0.00 - 0.03 10*3/mm3 Final   • Troponin I 05/27/2018 0.00  0.00 - 0.07 ng/mL Final    Serial Number: [id number]:  [id number]     No images are attached to the encounter or  orders placed in the encounter.  Xr Chest 1 View    Result Date: 5/28/2018  No acute cardiopulmonary disease.  DICTATED:     05/27/2018 EDITED/ls :     05/27/2018  This report was finalized on 5/28/2018 10:54 AM by Dr. Thais Kennedy MD.        Assessment / Plan   Lissett was seen today for diabetes.    Diagnoses and all orders for this visit:    Type 2 diabetes mellitus with other neurologic complication, without long-term current use of insulin  -     POC Glucose Fingerstick  -     POC Glycosylated Hemoglobin (Hb A1C)  -     Microalbumin / Creatinine Urine Ratio - Urine, Clean Catch  -     insulin glargine (LANTUS) 100 UNIT/ML injection; Inject 20 Units under the skin Every Night.    CKD (chronic kidney disease) stage 4, GFR 15-29 ml/min  -     Ambulatory Referral to Nephrology        Diabetes Mellitus 2 is under good control.  -A1c 7  -discussed diet in detail. Literature provided.  -exercise limited due to back pain  -dc bydureon due to renal function  -increase lantus to 15U QHS.  -cont actos (tolerating well) and tradjenta  -call in 4-6 weeks with blood sugar readings    1.  Diet: 3-4 carb servings per meal for females, 4-5 carb servings per meal for males  Spread carb intake throughout the day  Increase lean protein and vegetable intake  Avoid sugary drinks and processed carbs including crackers, cookies, cakes  2.  Exercise: Recommend at least 30 minutes of exercise daily, at least 5 days per week. Increase exercise gradually.   3.  Blood Glucose Goal: Blood glucose goal <150 fasting, <180 2 hr postprandial  4.  Microalbumin due  5.  Education performed during this visit: long term diabetic complications discussed. , annual eye examinations at Ophthalmology discussed, dental hygiene discussed  and foot care reviewed., home glucose monitoring emphasized, all medications, side effects and compliance discussed carefully and Hypoglycemia management and prevention reviewed. Reviewed ‘ABCs’ of diabetes  management (respective goals in parentheses):  A1C (<7), blood pressure (<130/80), and cholesterol (LDL <100, if CVD <70).    There are no Patient Instructions on file for this visit.    Follow up: Return in about 3 months (around 9/26/2018).    Discussed the nature of the disease including, risks, complications, implications, management, safe and proper use of medications. Encouraged therapeutic lifestyle changes including low calorie diet with plenty of fruits and vegetables, daily exercise, medication compliance, and keeping scheduled follow up appointments with me and any other providers. Encouraged patient to have appointment for complete physical, fasting labs, appropriate screenings, and immunizations on an annual basis.    30 min  of 45 min face-to-face visit time spent for coordination of care and counselling regarding identified problems as outlined in the objective, assessment and discussion portions of the documentation.    Laura Cameron PA-C

## 2018-06-28 LAB
CREAT 24H UR-MCNC: 49.5 MG/DL
MICROALBUMIN UR-MCNC: 4.2 UG/ML
MICROALBUMIN/CREAT UR: 8.5 MG/G CREAT (ref 0–30)

## 2018-07-19 ENCOUNTER — OFFICE VISIT (OUTPATIENT)
Dept: NEUROSURGERY | Facility: CLINIC | Age: 68
End: 2018-07-19

## 2018-07-19 VITALS
BODY MASS INDEX: 36.77 KG/M2 | DIASTOLIC BLOOD PRESSURE: 70 MMHG | WEIGHT: 228.8 LBS | HEIGHT: 66 IN | SYSTOLIC BLOOD PRESSURE: 140 MMHG | TEMPERATURE: 98.7 F

## 2018-07-19 DIAGNOSIS — G89.29 CHRONIC RIGHT SI JOINT PAIN: ICD-10-CM

## 2018-07-19 DIAGNOSIS — M79.671 RIGHT FOOT PAIN: ICD-10-CM

## 2018-07-19 DIAGNOSIS — M54.50 CHRONIC BILATERAL LOW BACK PAIN WITHOUT SCIATICA: ICD-10-CM

## 2018-07-19 DIAGNOSIS — IMO0001 CLASS 2 OBESITY DUE TO EXCESS CALORIES WITH SERIOUS COMORBIDITY AND BODY MASS INDEX (BMI) OF 37.0 TO 37.9 IN ADULT: Primary | ICD-10-CM

## 2018-07-19 DIAGNOSIS — M53.3 CHRONIC RIGHT SI JOINT PAIN: ICD-10-CM

## 2018-07-19 DIAGNOSIS — G89.29 CHRONIC BILATERAL LOW BACK PAIN WITHOUT SCIATICA: ICD-10-CM

## 2018-07-19 DIAGNOSIS — M79.604 RIGHT LEG PAIN: ICD-10-CM

## 2018-07-19 PROCEDURE — 99214 OFFICE O/P EST MOD 30 MIN: CPT | Performed by: PHYSICIAN ASSISTANT

## 2018-07-19 RX ORDER — PREGABALIN 75 MG/1
75 CAPSULE ORAL 2 TIMES DAILY
Qty: 180 CAPSULE | Refills: 1 | OUTPATIENT
Start: 2018-07-19 | End: 2019-08-22

## 2018-07-19 RX ORDER — INSULIN GLARGINE 100 [IU]/ML
35 INJECTION, SOLUTION SUBCUTANEOUS NIGHTLY
COMMUNITY

## 2018-07-19 NOTE — PROGRESS NOTES
F/U Visit  Subjective   Patient ID: Lissett Lowry is a 67 y.o. female  [id number]    Chief Complaint   Patient presents with   • Back Pain     MRI     Back Pain   This is a chronic problem. The current episode started more than 1 year ago. The problem occurs constantly. The problem has been gradually worsening since onset. The pain is present in the sacro-iliac. The quality of the pain is described as aching, burning and shooting. The pain radiates to the right foot, right knee and right thigh. The pain is at a severity of 7/10. The pain is the same all the time. The symptoms are aggravated by sitting and standing. Stiffness is present all day. Associated symptoms include leg pain, numbness, paresthesias, tingling and weakness. Pertinent negatives include no abdominal pain, bladder incontinence, bowel incontinence, chest pain, dysuria, fever, headaches, paresis, pelvic pain, perianal numbness or weight loss. Risk factors include lack of exercise, obesity and sedentary lifestyle.     68 yo WF with multiple co-morbidities presentedTo the emergency department on 5/6/2018 with complaints of back pain radiating down the posterior right leg into the foot. No studies were performed, she was send home with stretching excises, Robaxin and Percocet and instructed on heat application to the right SI joint area.  She was to follow up with her primary care physician in one week.  She presents to our office today with ongoing symptoms that have changed in nature.  She reports that the pain down her leg has improved significantly since her visit to the emergency room on 5/6/2018.  Most of her pain now is located on the medial aspect of the bottom of her right foot it involves a portion of her heel arch and the base of her great toe and into the great toe.  She describes numbness and tingling into the foot and especially the right great toe, she offers symptoms of coldness and and burning.  Her symptoms are worse if she does not  "have on a shoe.  She occasionally gets some twitching into the right foot, she has been taking Flexeril 10 mg half to 1 at bedtime and reports that this has helped her symptoms.  She denies any weakness in the leg, denies bladder or bowel problems.  Walking and standing make symptoms worse, sitting and laying improves her symptoms.  Her activities have beenvery sedentary especially now with the SOB/CURRIE. Patient reports her glucoses were on the average of 140 to 150,now they are 120, 130 with medicine adjustments. she is unsure of her hemoglobin A1c.  She does have severe chronic renal dysfunction stage III or 4.  She has been and had her stress test by cardiology which was normal.   In addition to updating the above complaints she is also complaining of memory problems in name recognition.  She has seen Dr. Guan in the past , she and her  both feel that she is not ready to see him again.    Past Medical History:   Diagnosis Date   • Anemia    • Chronic pain in left shoulder    • Diabetes mellitus (CMS/HCC)    • Gout    • Heart disease    • Hyperlipidemia    • Hypertension    • IBS (irritable bowel syndrome)    • Kidney stone    • Obesity    • PHYLLIS on CPAP    • Osteoarthritis of right elbow    • Osteoporosis    • Primary osteoarthritis of left knee    • Rotator cuff tear    • SLE (systemic lupus erythematosus) (CMS/HCC)    • SOB (shortness of breath)        Allergies   Allergen Reactions   • Duract [Bromfenac] Hives   • Duricef [Cefadroxil] Hives   • Erythromycin Hives   • Lamisil [Terbinafine Hcl] Other (See Comments)     \"IT CAUSED LUPUS\"   • Lasix [Furosemide] Other (See Comments)     PT STATES \"IM JUST VERY SENSITIVE TO IT.\"   • Mevacor [Lovastatin] Other (See Comments)     PT REPORTS \"IT JUST MADE ME FEEL SICK, I COULDN'T TOLERATE IT NOTHING SPECIFIC.\"   • Other Other (See Comments)     LAMOSIL CAUSED DRUG INDUCED LUPUS PER PATIENT   • Sulfa Antibiotics Hives   • Sulfanilamide    • Zocor [Simvastatin] " "Other (See Comments)     \"IT JUST MADE ME FEEL SICK, I COULDN'T TOLERATE IT. NOTHING SPECIFIC.\"         Current Outpatient Prescriptions:   •  acetaminophen (TYLENOL 8 HOUR ARTHRITIS PAIN) 650 MG 8 hr tablet, Take 650 mg by mouth Every 8 (Eight) Hours As Needed for Mild Pain ., Disp: , Rfl:   •  AMLODIPINE BESYLATE PO, Take 5 mg by mouth Daily., Disp: , Rfl:   •  Bumetanide (BUMEX PO), Take 1 mg by mouth 2 (Two) Times a Day. PT STATES SHE FORGETS TO TAKE IT TWICE, USUALLY TAKES IT ONLY ONCE DAILY, Disp: , Rfl:   •  cyclobenzaprine (FLEXERIL) 10 MG tablet, Take 10 mg by mouth 3 (Three) Times a Day As Needed for Muscle Spasms., Disp: , Rfl:   •  famotidine (PEPCID) 20 MG tablet, Take 20 mg by mouth Daily., Disp: , Rfl:   •  fenofibrate micronized (LOFIBRA) 134 MG capsule, Take  by mouth Daily., Disp: , Rfl:   •  insulin glargine (LANTUS) 100 UNIT/ML injection, Inject  under the skin Daily. 200unit, Disp: , Rfl:   •  linagliptin (TRADJENTA) 5 MG tablet tablet, Take  by mouth Daily., Disp: , Rfl:   •  lisinopril-hydrochlorothiazide (PRINZIDE,ZESTORETIC) 20-25 MG per tablet, Take 1 tablet by mouth Daily., Disp: , Rfl:   •  Meloxicam (MOBIC PO), Take 7.5 mg by mouth As Needed., Disp: , Rfl:   •  metoprolol tartrate (LOPRESSOR) 25 MG tablet, Take 25 mg by mouth Daily., Disp: , Rfl:   •  pioglitazone (ACTOS) 45 MG tablet, Take  by mouth Daily., Disp: , Rfl:   •  pregabalin (LYRICA) 75 MG capsule, Take 75 mg by mouth 2 (Two) Times a Day., Disp: , Rfl:   •  rosuvastatin (CRESTOR) 5 MG tablet, Take  by mouth Daily., Disp: , Rfl:   •  sertraline (ZOLOFT) 100 MG tablet, Take  by mouth Daily., Disp: , Rfl:   •  spironolactone (ALDACTONE) 25 MG tablet, Take  by mouth Take As Directed., Disp: , Rfl:   Social History   Substance Use Topics   • Smoking status: Never Smoker   • Smokeless tobacco: Never Used   • Alcohol use No     Review of Systems   Constitutional: Negative for activity change, appetite change, chills, diaphoresis, " fatigue, fever, unexpected weight change and weight loss.   HENT: Negative for congestion, dental problem, drooling, ear discharge, ear pain, facial swelling, hearing loss, mouth sores, nosebleeds, postnasal drip, rhinorrhea, sinus pressure, sneezing, sore throat, tinnitus, trouble swallowing and voice change.    Eyes: Negative for photophobia, pain, discharge, redness, itching and visual disturbance.   Respiratory: Positive for apnea and shortness of breath. Negative for cough, choking, chest tightness, wheezing and stridor.    Cardiovascular: Negative for chest pain, palpitations and leg swelling.   Gastrointestinal: Negative for abdominal distention, abdominal pain, anal bleeding, blood in stool, bowel incontinence, constipation, diarrhea, nausea, rectal pain and vomiting.   Endocrine: Positive for polydipsia. Negative for cold intolerance, heat intolerance, polyphagia and polyuria.   Genitourinary: Negative for bladder incontinence, decreased urine volume, difficulty urinating, dysuria, enuresis, flank pain, frequency, genital sores, hematuria, pelvic pain and urgency.   Musculoskeletal: Positive for arthralgias, back pain and gait problem. Negative for joint swelling, myalgias, neck pain and neck stiffness.   Skin: Negative for color change, pallor, rash and wound.   Allergic/Immunologic: Negative for environmental allergies, food allergies and immunocompromised state.   Neurological: Positive for tingling, weakness, numbness and paresthesias. Negative for dizziness, tremors, seizures, syncope, facial asymmetry, speech difficulty, light-headedness and headaches.   Hematological: Negative for adenopathy. Does not bruise/bleed easily.   Psychiatric/Behavioral: Positive for confusion and dysphoric mood. Negative for agitation, behavioral problems, decreased concentration, hallucinations, self-injury, sleep disturbance and suicidal ideas. The patient is not nervous/anxious and is not hyperactive.        Physical  "Exam  /70 (BP Location: Right arm, Patient Position: Sitting, Cuff Size: Adult)   Temp 98.7 °F (37.1 °C) (Temporal Artery )   Ht 167.6 cm (65.98\")   Wt 104 kg (228 lb 12.8 oz)   BMI 36.95 kg/m²     Neurologic Exam    Independent Review of Radiographic Studies:   She presents today with an MRI scan from [facility] which shows spinal stenosis at L2-3, L3 4, L4 5.  She has severe stenosis at L4 5 and L23.  She has degenerative disc disease at L2-3 and L4 5 with loss of disc space height especially at L2-3.  There is mild foraminal stenosis at L34 with a rightward disc bulge without nerve root compression    Medical Decision Making:   At the present time the Lyrica 75 mg twice a day has significantly helped with the patient's pain and numbness.  She is attending physical therapy and reports that she's feeling better after her therapy.  She has been to endocrinology and there has been adjustments in her medications and diet education.  The patient reports that according to her home scale she's lost 3 pounds.  She has an upcoming appointment in August with the nephrology group.  She does have significant spinal stenosis that will probably need surgical intervention at some point but with improvement in her symptoms at the present time we would encourage further weight loss and physical therapy and follow her in 2 months with an update on her condition.  She and her  were in full agreement with this plan.  A prescription will be called in to her mail order pharmacy for her Lyrica 75 mg twice a day.    Amy Orr PA-C                        "

## 2018-09-24 ENCOUNTER — OFFICE VISIT (OUTPATIENT)
Dept: NEUROSURGERY | Facility: CLINIC | Age: 68
End: 2018-09-24

## 2018-09-24 VITALS
WEIGHT: 289 LBS | HEIGHT: 66 IN | SYSTOLIC BLOOD PRESSURE: 126 MMHG | TEMPERATURE: 97.6 F | DIASTOLIC BLOOD PRESSURE: 70 MMHG | BODY MASS INDEX: 46.45 KG/M2

## 2018-09-24 DIAGNOSIS — M17.12 PRIMARY OSTEOARTHRITIS OF LEFT KNEE: ICD-10-CM

## 2018-09-24 DIAGNOSIS — M79.604 RIGHT LEG PAIN: ICD-10-CM

## 2018-09-24 DIAGNOSIS — M81.0 OSTEOPOROSIS WITHOUT CURRENT PATHOLOGICAL FRACTURE, UNSPECIFIED OSTEOPOROSIS TYPE: ICD-10-CM

## 2018-09-24 DIAGNOSIS — M53.3 CHRONIC RIGHT SI JOINT PAIN: ICD-10-CM

## 2018-09-24 DIAGNOSIS — M54.50 CHRONIC BILATERAL LOW BACK PAIN WITHOUT SCIATICA: ICD-10-CM

## 2018-09-24 DIAGNOSIS — M79.671 RIGHT FOOT PAIN: ICD-10-CM

## 2018-09-24 DIAGNOSIS — G89.29 CHRONIC RIGHT SI JOINT PAIN: ICD-10-CM

## 2018-09-24 DIAGNOSIS — E11.49 TYPE 2 DIABETES MELLITUS WITH OTHER NEUROLOGIC COMPLICATION, WITHOUT LONG-TERM CURRENT USE OF INSULIN (HCC): ICD-10-CM

## 2018-09-24 DIAGNOSIS — G89.29 CHRONIC BILATERAL LOW BACK PAIN WITHOUT SCIATICA: ICD-10-CM

## 2018-09-24 PROCEDURE — 99213 OFFICE O/P EST LOW 20 MIN: CPT | Performed by: PHYSICIAN ASSISTANT

## 2018-09-28 ENCOUNTER — OFFICE VISIT (OUTPATIENT)
Dept: ENDOCRINOLOGY | Facility: CLINIC | Age: 68
End: 2018-09-28

## 2018-09-28 VITALS
HEART RATE: 54 BPM | DIASTOLIC BLOOD PRESSURE: 68 MMHG | OXYGEN SATURATION: 98 % | BODY MASS INDEX: 45.9 KG/M2 | WEIGHT: 284.19 LBS | SYSTOLIC BLOOD PRESSURE: 120 MMHG

## 2018-09-28 DIAGNOSIS — E11.9 CONTROLLED TYPE 2 DIABETES MELLITUS WITHOUT COMPLICATION, WITH LONG-TERM CURRENT USE OF INSULIN (HCC): Primary | ICD-10-CM

## 2018-09-28 DIAGNOSIS — Z79.4 CONTROLLED TYPE 2 DIABETES MELLITUS WITHOUT COMPLICATION, WITH LONG-TERM CURRENT USE OF INSULIN (HCC): Primary | ICD-10-CM

## 2018-09-28 LAB
GLUCOSE BLDC GLUCOMTR-MCNC: 125 MG/DL (ref 70–130)
HBA1C MFR BLD: 7.6 %

## 2018-09-28 PROCEDURE — 99214 OFFICE O/P EST MOD 30 MIN: CPT | Performed by: PHYSICIAN ASSISTANT

## 2018-09-28 PROCEDURE — 82947 ASSAY GLUCOSE BLOOD QUANT: CPT | Performed by: PHYSICIAN ASSISTANT

## 2018-09-28 PROCEDURE — 83036 HEMOGLOBIN GLYCOSYLATED A1C: CPT | Performed by: PHYSICIAN ASSISTANT

## 2018-09-28 RX ORDER — METOPROLOL SUCCINATE 50 MG/1
75 TABLET, EXTENDED RELEASE ORAL DAILY
COMMUNITY

## 2018-09-28 NOTE — PROGRESS NOTES
"Chief Complaint  F/u for Diabetes Mellitus.    HPI   Lissett Lowry is a 68 y.o. female who is here today for f/u of Diabetes Mellitus type 2.  The initial diagnosis of diabetes was made approximately 2005.    a1c- 7.6 (9/28/18), 7 (6/26/18)  Had a bout of gout, taking prednisone. Has 3 doses left. Has not noticed an increased in BS.   Sees Dr Stringer for nephrology.   Labs reviewed:  4/2018- LDL 62, , hgb 11.3, hct 36, cr 1.5, gfr 35, b12 >1500, TSH 1.9, vitamin d 35    Diabetic complications: none  Eye exam current (within one year): 6/2018- no retinopathy  Foot care and dental care: discussed    Current diabetic medications include:  actos 45mg qd  tradjenta 5mg qd  lantus 20U QHS - started April 2018, dose increased from 10U 6/2018     Takes lyrica for back pain that radiates to rt leg and foot. No neuropathy. Gabapentin did not help.    Statin: crestor 5    Past medications: metformin and bydureon (dc due to renal fxn)    Diabetic Monitoring  - checks glucose 1x/day  Glucose is averaging- AM mostly 160-180  Hypoglycemia- no  Home blood sugar records: glucometer downloaded, data reviewed and scanned to chart    Nutrition:     Current diet: in general, an \"unhealthy\" diet, eats lunch and dinner most days. Will sometimes binge eat. Eating out a lot. No sugary drinks.   Current exercise: none due to chronic back pain  Seen RD in past: no    The following portions of the patient's history were reviewed and updated by me as appropriate: allergies, current medications, past family history, past social history, past surgical history and problem list.    Past Medical History:   Diagnosis Date   • Anemia    • Chronic pain in left shoulder    • Diabetes mellitus (CMS/HCC)    • Gout    • Heart disease    • Hyperlipidemia    • Hypertension    • IBS (irritable bowel syndrome)    • Kidney stone    • Obesity    • PHYLLIS on CPAP    • Osteoarthritis of right elbow    • Osteoporosis    • Primary osteoarthritis of left knee    • " Rotator cuff tear    • SLE (systemic lupus erythematosus) (CMS/HCC)    • SOB (shortness of breath)        Medications    Current Outpatient Prescriptions:   •  acetaminophen (TYLENOL 8 HOUR ARTHRITIS PAIN) 650 MG 8 hr tablet, Take 650 mg by mouth Every 8 (Eight) Hours As Needed for Mild Pain ., Disp: , Rfl:   •  AMLODIPINE BESYLATE PO, Take 5 mg by mouth Daily., Disp: , Rfl:   •  Bumetanide (BUMEX PO), Take 1 mg by mouth 2 (Two) Times a Day. PT STATES SHE FORGETS TO TAKE IT TWICE, USUALLY TAKES IT ONLY ONCE DAILY, Disp: , Rfl:   •  cyclobenzaprine (FLEXERIL) 10 MG tablet, Take 10 mg by mouth 3 (Three) Times a Day As Needed for Muscle Spasms., Disp: , Rfl:   •  famotidine (PEPCID) 20 MG tablet, Take 20 mg by mouth Daily., Disp: , Rfl:   •  fenofibrate micronized (LOFIBRA) 134 MG capsule, Take  by mouth Daily., Disp: , Rfl:   •  insulin glargine (LANTUS) 100 UNIT/ML injection, Inject  under the skin Daily. 200unit, Disp: , Rfl:   •  linagliptin (TRADJENTA) 5 MG tablet tablet, Take  by mouth Daily., Disp: , Rfl:   •  lisinopril-hydrochlorothiazide (PRINZIDE,ZESTORETIC) 20-25 MG per tablet, Take 1 tablet by mouth Daily., Disp: , Rfl:   •  metoprolol succinate XL (TOPROL-XL) 50 MG 24 hr tablet, Take 50 mg by mouth Daily., Disp: , Rfl:   •  pioglitazone (ACTOS) 45 MG tablet, Take  by mouth Daily., Disp: , Rfl:   •  pregabalin (LYRICA) 75 MG capsule, Take 1 capsule by mouth 2 (Two) Times a Day., Disp: 180 capsule, Rfl: 1  •  rosuvastatin (CRESTOR) 5 MG tablet, Take  by mouth Daily., Disp: , Rfl:   •  sertraline (ZOLOFT) 100 MG tablet, Take  by mouth Daily., Disp: , Rfl:   •  Meloxicam (MOBIC PO), Take 7.5 mg by mouth As Needed., Disp: , Rfl:   •  spironolactone (ALDACTONE) 25 MG tablet, Take  by mouth Take As Directed., Disp: , Rfl:     Review of Systems  Review of Systems   Constitutional: Positive for fatigue. Negative for chills, fever and unexpected weight change.   HENT: Negative for ear pain, hearing loss,  nosebleeds, rhinorrhea and sore throat.    Eyes: Negative for pain, discharge, redness, itching and visual disturbance.   Respiratory: Negative for cough, shortness of breath and wheezing.    Cardiovascular: Negative for chest pain, palpitations and leg swelling.   Gastrointestinal: Negative for abdominal pain, blood in stool, constipation and diarrhea.   Endocrine: Positive for polyphagia. Negative for cold intolerance, heat intolerance and polydipsia.   Genitourinary: Negative for dysuria, frequency, hematuria, pelvic pain, vaginal bleeding and vaginal discharge.   Musculoskeletal: Positive for arthralgias and back pain. Negative for gait problem, joint swelling and myalgias.   Skin: Negative for rash.   Allergic/Immunologic: Negative.    Neurological: Negative for dizziness, syncope, weakness, numbness and headaches.   Hematological: Negative for adenopathy. Does not bruise/bleed easily.   Psychiatric/Behavioral: Negative for sleep disturbance and suicidal ideas. The patient is not nervous/anxious.         Physical Exam    /68   Pulse 54   Wt 129 kg (284 lb 3 oz)   SpO2 98%   BMI 45.90 kg/m² Body mass index is 45.9 kg/m².  Physical Exam   Constitutional: She is oriented to person, place, and time. She appears well-developed. No distress.   HENT:   Head: Normocephalic.   Right Ear: External ear normal.   Left Ear: External ear normal.   Mouth/Throat: No oropharyngeal exudate.   Eyes: Conjunctivae and lids are normal. Right eye exhibits no discharge. Left eye exhibits no discharge. Right pupil is reactive. Left pupil is reactive.   Neck: No JVD present. No tracheal deviation present. No thyroid mass and no thyromegaly present.   Cardiovascular: Normal rate, regular rhythm, normal heart sounds and intact distal pulses.    No murmur heard.  Pulmonary/Chest: Effort normal and breath sounds normal. No respiratory distress. She has no wheezes.   Abdominal: Soft. Bowel sounds are normal. There is no tenderness.    Musculoskeletal: She exhibits no edema or tenderness.   Lymphadenopathy:     She has no cervical adenopathy.   Neurological: She is alert and oriented to person, place, and time.   Skin: Skin is warm, dry and intact. No rash noted. She is not diaphoretic. No erythema.   Psychiatric: She has a normal mood and affect. Her speech is normal and behavior is normal. Thought content normal.       Labs and Imaging   Lab Results   Component Value Date    HGBA1C 7.0 06/26/2018     No visits with results within 1 Month(s) from this visit.   Latest known visit with results is:   Office Visit on 06/26/2018   Component Date Value Ref Range Status   • Glucose 06/26/2018 203* 70 - 130 mg/dL Final   • Hemoglobin A1C 06/26/2018 7.0  % Final   • Creatinine, Urine 06/26/2018 49.5  Not Estab. mg/dL Final   • Microalbumin, Urine 06/26/2018 4.2  Not Estab. ug/mL Final   • Microalbumin/Creatinine Ratio 06/26/2018 8.5  0.0 - 30.0 mg/g creat Final     No images are attached to the encounter or orders placed in the encounter.  Xr Chest 1 View    Result Date: 5/28/2018  No acute cardiopulmonary disease.  DICTATED:     05/27/2018 EDITED/ls :     05/27/2018  This report was finalized on 5/28/2018 10:54 AM by Dr. Thais Kennedy MD.        Assessment / Plan   Lissett was seen today for follow-up.    Diagnoses and all orders for this visit:    Controlled type 2 diabetes mellitus without complication, with long-term current use of insulin (CMS/[facility])  -     POC Glucose Fingerstick  -     POC Glycosylated Hemoglobin (Hb A1C)        Diabetes Mellitus 2 is under good control.  -A1c 7.6, up from 7 6/2018  -exercise limited due to back pain  -increase lantus to 23U QHS, after a week increase to 25U QHS. If she experiences hypoglycemia, can back off to 23u.   -cont actos (tolerating well) and tradjenta  -bring BS log again to f/u    1.  Diet: 3-4 carb servings per meal for females, 4-5 carb servings per meal for males  Spread carb intake throughout the  day  Increase lean protein and vegetable intake  Avoid sugary drinks and processed carbs including crackers, cookies, cakes  2.  Exercise: Recommend at least 30 minutes of exercise daily, at least 5 days per week. Increase exercise gradually.   3.  Blood Glucose Goal: Blood glucose goal <150 fasting, <180 2 hr postprandial  4.  Microalbumin due 6/2019  5.  Education performed during this visit: long term diabetic complications discussed. , annual eye examinations at Ophthalmology discussed, dental hygiene discussed  and foot care reviewed., home glucose monitoring emphasized, all medications, side effects and compliance discussed carefully and Hypoglycemia management and prevention reviewed. Reviewed ‘ABCs’ of diabetes management (respective goals in parentheses):  A1C (<7), blood pressure (<130/80), and cholesterol (LDL <100, if CVD <70).    There are no Patient Instructions on file for this visit.    Follow up: Return in about 3 months (around 12/28/2018).    Discussed the nature of the disease including, risks, complications, implications, management, safe and proper use of medications. Encouraged therapeutic lifestyle changes including low calorie diet with plenty of fruits and vegetables, daily exercise, medication compliance, and keeping scheduled follow up appointments with me and any other providers. Encouraged patient to have appointment for complete physical, fasting labs, appropriate screenings, and immunizations on an annual basis.    20 min  of 30 min face-to-face visit time spent for coordination of care and counselling regarding identified problems as outlined in the objective, assessment and discussion portions of the documentation.    Laura Cameron PA-C

## 2018-11-05 ENCOUNTER — LAB REQUISITION (OUTPATIENT)
Dept: LAB | Facility: HOSPITAL | Age: 68
End: 2018-11-05

## 2018-11-05 DIAGNOSIS — E11.9 TYPE 2 DIABETES MELLITUS WITHOUT COMPLICATIONS (HCC): ICD-10-CM

## 2018-11-05 DIAGNOSIS — E78.9 DISORDER OF LIPOPROTEIN METABOLISM: ICD-10-CM

## 2018-11-05 DIAGNOSIS — I10 ESSENTIAL (PRIMARY) HYPERTENSION: ICD-10-CM

## 2018-11-05 PROCEDURE — 36415 COLL VENOUS BLD VENIPUNCTURE: CPT | Performed by: INTERNAL MEDICINE

## 2018-12-29 ENCOUNTER — HOSPITAL ENCOUNTER (EMERGENCY)
Facility: HOSPITAL | Age: 68
Discharge: HOME | End: 2018-12-29
Attending: EMERGENCY MEDICINE | Admitting: EMERGENCY MEDICINE

## 2018-12-29 VITALS
RESPIRATION RATE: 20 BRPM | OXYGEN SATURATION: 91 % | TEMPERATURE: 100.3 F | HEART RATE: 86 BPM | HEIGHT: 67 IN | DIASTOLIC BLOOD PRESSURE: 63 MMHG | BODY MASS INDEX: 45.04 KG/M2 | WEIGHT: 287 LBS | SYSTOLIC BLOOD PRESSURE: 156 MMHG

## 2018-12-29 DIAGNOSIS — J11.1 INFLUENZA-LIKE ILLNESS: Primary | ICD-10-CM

## 2018-12-29 DIAGNOSIS — M79.10 MYALGIA: ICD-10-CM

## 2018-12-29 LAB
ALBUMIN SERPL-MCNC: 4.62 G/DL (ref 3.2–4.8)
ALBUMIN/GLOB SERPL: 1.6 G/DL (ref 1.5–2.5)
ALP SERPL-CCNC: 47 U/L (ref 25–100)
ALT SERPL W P-5'-P-CCNC: 13 U/L (ref 7–40)
ANION GAP SERPL CALCULATED.3IONS-SCNC: 11 MMOL/L (ref 3–11)
AST SERPL-CCNC: 21 U/L (ref 0–33)
BASOPHILS # BLD AUTO: 0.01 10*3/MM3 (ref 0–0.2)
BASOPHILS NFR BLD AUTO: 0.1 % (ref 0–1)
BILIRUB SERPL-MCNC: 0.6 MG/DL (ref 0.3–1.2)
BUN BLD-MCNC: 33 MG/DL (ref 9–23)
BUN/CREAT SERPL: 19.8 (ref 7–25)
CALCIUM SPEC-SCNC: 10.9 MG/DL (ref 8.7–10.4)
CHLORIDE SERPL-SCNC: 99 MMOL/L (ref 99–109)
CK SERPL-CCNC: 43 U/L (ref 26–174)
CO2 SERPL-SCNC: 26 MMOL/L (ref 20–31)
CREAT BLD-MCNC: 1.67 MG/DL (ref 0.6–1.3)
DEPRECATED RDW RBC AUTO: 48.1 FL (ref 37–54)
EOSINOPHIL # BLD AUTO: 0 10*3/MM3 (ref 0–0.3)
EOSINOPHIL NFR BLD AUTO: 0 % (ref 0–3)
ERYTHROCYTE [DISTWIDTH] IN BLOOD BY AUTOMATED COUNT: 14 % (ref 11.3–14.5)
FLUAV AG NPH QL: NEGATIVE
FLUBV AG NPH QL IA: NEGATIVE
GFR SERPL CREATININE-BSD FRML MDRD: 31 ML/MIN/1.73
GLOBULIN UR ELPH-MCNC: 2.9 GM/DL
GLUCOSE BLD-MCNC: 255 MG/DL (ref 70–100)
HCT VFR BLD AUTO: 34.8 % (ref 34.5–44)
HGB BLD-MCNC: 10.8 G/DL (ref 11.5–15.5)
IMM GRANULOCYTES # BLD AUTO: 0.06 10*3/MM3 (ref 0–0.03)
IMM GRANULOCYTES NFR BLD AUTO: 0.5 % (ref 0–0.6)
LYMPHOCYTES # BLD AUTO: 0.99 10*3/MM3 (ref 0.6–4.8)
LYMPHOCYTES NFR BLD AUTO: 8.9 % (ref 24–44)
MCH RBC QN AUTO: 29 PG (ref 27–31)
MCHC RBC AUTO-ENTMCNC: 31 G/DL (ref 32–36)
MCV RBC AUTO: 93.3 FL (ref 80–99)
MONOCYTES # BLD AUTO: 0.77 10*3/MM3 (ref 0–1)
MONOCYTES NFR BLD AUTO: 6.9 % (ref 0–12)
NEUTROPHILS # BLD AUTO: 9.33 10*3/MM3 (ref 1.5–8.3)
NEUTROPHILS NFR BLD AUTO: 83.6 % (ref 41–71)
PLATELET # BLD AUTO: 299 10*3/MM3 (ref 150–450)
PMV BLD AUTO: 10.7 FL (ref 6–12)
POTASSIUM BLD-SCNC: 3.9 MMOL/L (ref 3.5–5.5)
PROT SERPL-MCNC: 7.5 G/DL (ref 5.7–8.2)
RBC # BLD AUTO: 3.73 10*6/MM3 (ref 3.89–5.14)
SODIUM BLD-SCNC: 136 MMOL/L (ref 132–146)
WBC NRBC COR # BLD: 11.16 10*3/MM3 (ref 3.5–10.8)

## 2018-12-29 PROCEDURE — 85025 COMPLETE CBC W/AUTO DIFF WBC: CPT | Performed by: EMERGENCY MEDICINE

## 2018-12-29 PROCEDURE — 99284 EMERGENCY DEPT VISIT MOD MDM: CPT

## 2018-12-29 PROCEDURE — 82550 ASSAY OF CK (CPK): CPT | Performed by: EMERGENCY MEDICINE

## 2018-12-29 PROCEDURE — 87804 INFLUENZA ASSAY W/OPTIC: CPT | Performed by: EMERGENCY MEDICINE

## 2018-12-29 PROCEDURE — 80053 COMPREHEN METABOLIC PANEL: CPT | Performed by: EMERGENCY MEDICINE

## 2018-12-29 RX ORDER — ALLOPURINOL 100 MG/1
100 TABLET ORAL DAILY
COMMUNITY
End: 2019-05-22

## 2019-01-04 DIAGNOSIS — R53.81 PHYSICAL DECONDITIONING: ICD-10-CM

## 2019-01-04 DIAGNOSIS — M81.0 OSTEOPOROSIS WITHOUT CURRENT PATHOLOGICAL FRACTURE, UNSPECIFIED OSTEOPOROSIS TYPE: ICD-10-CM

## 2019-01-04 DIAGNOSIS — M51.36 DDD (DEGENERATIVE DISC DISEASE), LUMBAR: ICD-10-CM

## 2019-01-04 DIAGNOSIS — G89.29 CHRONIC BILATERAL LOW BACK PAIN WITHOUT SCIATICA: ICD-10-CM

## 2019-01-04 DIAGNOSIS — M79.604 RIGHT LEG PAIN: ICD-10-CM

## 2019-01-04 DIAGNOSIS — M43.10 RETROLISTHESIS OF VERTEBRAE: Primary | ICD-10-CM

## 2019-01-04 DIAGNOSIS — M54.50 CHRONIC BILATERAL LOW BACK PAIN WITHOUT SCIATICA: ICD-10-CM

## 2019-01-04 PROBLEM — M51.369 DDD (DEGENERATIVE DISC DISEASE), LUMBAR: Status: ACTIVE | Noted: 2019-01-04

## 2019-01-04 PROBLEM — E11.9 DIABETES MELLITUS: Status: ACTIVE | Noted: 2019-01-04

## 2019-02-06 ENCOUNTER — OFFICE VISIT (OUTPATIENT)
Dept: ORTHOPEDIC SURGERY | Facility: CLINIC | Age: 69
End: 2019-02-06

## 2019-02-06 VITALS — BODY MASS INDEX: 46.38 KG/M2 | HEIGHT: 66 IN | OXYGEN SATURATION: 95 % | WEIGHT: 288.58 LBS | HEART RATE: 74 BPM

## 2019-02-06 DIAGNOSIS — M19.071 ARTHRITIS OF ANKLE OR FOOT, DEGENERATIVE, RIGHT: ICD-10-CM

## 2019-02-06 DIAGNOSIS — M1A.0710 CHRONIC GOUT OF RIGHT ANKLE, UNSPECIFIED CAUSE: Primary | ICD-10-CM

## 2019-02-06 DIAGNOSIS — M79.671 RIGHT FOOT PAIN: ICD-10-CM

## 2019-02-06 DIAGNOSIS — M25.571 RIGHT ANKLE PAIN, UNSPECIFIED CHRONICITY: ICD-10-CM

## 2019-02-06 PROCEDURE — 20605 DRAIN/INJ JOINT/BURSA W/O US: CPT | Performed by: ORTHOPAEDIC SURGERY

## 2019-02-06 PROCEDURE — 99213 OFFICE O/P EST LOW 20 MIN: CPT | Performed by: PHYSICIAN ASSISTANT

## 2019-02-06 RX ADMIN — BUPIVACAINE HYDROCHLORIDE 2 ML: 2.5 INJECTION, SOLUTION INFILTRATION; PERINEURAL at 10:56

## 2019-02-06 RX ADMIN — METHYLPREDNISOLONE ACETATE 80 MG: 40 INJECTION, SUSPENSION INTRA-ARTICULAR; INTRALESIONAL; INTRAMUSCULAR; SOFT TISSUE at 10:56

## 2019-02-06 NOTE — PROGRESS NOTES
Procedure   Small Joint Arthrocentesis: R subtalar  Consent given by: patient  Site marked: site marked  Timeout: Immediately prior to procedure a time out was called to verify the correct patient, procedure, equipment, support staff and site/side marked as required   Supporting Documentation  Indications: pain   Procedure Details  Location: foot - R subtalar  Preparation: Patient was prepped and draped in the usual sterile fashion  Needle size: 22 G  Approach: lateral  Medications administered: 2 mL bupivacaine 0.25 %; 80 mg methylPREDNISolone acetate 40 MG/ML  Patient tolerance: patient tolerated the procedure well with no immediate complications

## 2019-02-08 RX ORDER — BUPIVACAINE HYDROCHLORIDE 2.5 MG/ML
2 INJECTION, SOLUTION INFILTRATION; PERINEURAL
Status: COMPLETED | OUTPATIENT
Start: 2019-02-06 | End: 2019-02-06

## 2019-02-08 RX ORDER — METHYLPREDNISOLONE ACETATE 40 MG/ML
80 INJECTION, SUSPENSION INTRA-ARTICULAR; INTRALESIONAL; INTRAMUSCULAR; SOFT TISSUE
Status: COMPLETED | OUTPATIENT
Start: 2019-02-06 | End: 2019-02-06

## 2019-02-08 NOTE — PROGRESS NOTES
[facility] [place] [facility] Note    Subjective     Patient: Lissett Lowry  : 1950    Primary Care Provider: Konstantin Gibson MD    Requesting Provider: As above    Pain of the Right Ankle      History    Chief Complaint: Right foot and ankle pain    History of Present Illness: This is a very pleasant 68-year-old female presenting today with 4-5 month history of intermittent right ankle pain.  She complains of pain is worse with weightbearing and walking but she has rest pain as well.  She complains of swelling and warmth in the ankle.  She denies any radiating pain or mechanical symptoms.  She rates the pain to be 6-10/10 and aching in nature.  She complains of some tingling in her toes.  She has a history of gout and over the past 4-5 months has been treated for flares in the ankle with oral prednisone which seem to improve the pain.  She has taken allopurinol in the past.  She did try colchicine with one flare but only took 3 pills which did not resolve her problem so she discontinued.  She had MRI on 2019 which showed incidental findings of partial tearing in the peroneals as well as the anterior tib for which she is asymptomatic.  Also shows extensive arthritic changes in the midfoot and hindfoot and significant synovium in the ankle as well.  She is here for further evaluation and treatment recommendations.    Current Outpatient Medications on File Prior to Visit   Medication Sig Dispense Refill   • acetaminophen (TYLENOL 8 HOUR ARTHRITIS PAIN) 650 MG 8 hr tablet Take 650 mg by mouth Every 8 (Eight) Hours As Needed for Mild Pain .     • allopurinol (ZYLOPRIM) 100 MG tablet Take 100 mg by mouth Daily.     • AMLODIPINE BESYLATE PO Take 5 mg by mouth Daily.     • Bumetanide (BUMEX PO) Take 1 mg by mouth 2 (Two) Times a Day. PT STATES SHE FORGETS TO TAKE IT TWICE, USUALLY TAKES IT ONLY ONCE DAILY     • cyclobenzaprine (FLEXERIL) 10 MG tablet Take 10 mg by mouth 3 (Three) Times a  "Day As Needed for Muscle Spasms.     • famotidine (PEPCID) 20 MG tablet Take 20 mg by mouth Daily.     • fenofibrate micronized (LOFIBRA) 134 MG capsule Take  by mouth Daily.     • insulin glargine (LANTUS) 100 UNIT/ML injection Inject  under the skin Daily. 200unit     • linagliptin (TRADJENTA) 5 MG tablet tablet Take  by mouth Daily.     • lisinopril-hydrochlorothiazide (PRINZIDE,ZESTORETIC) 20-25 MG per tablet Take 1 tablet by mouth Daily.     • metoprolol succinate XL (TOPROL-XL) 50 MG 24 hr tablet Take 50 mg by mouth Daily.     • pioglitazone (ACTOS) 45 MG tablet Take  by mouth Daily.     • pregabalin (LYRICA) 75 MG capsule Take 1 capsule by mouth 2 (Two) Times a Day. 180 capsule 1   • rosuvastatin (CRESTOR) 5 MG tablet Take  by mouth Daily.     • sertraline (ZOLOFT) 100 MG tablet Take  by mouth Daily.     • spironolactone (ALDACTONE) 25 MG tablet Take  by mouth Take As Directed.       No current facility-administered medications on file prior to visit.       Allergies   Allergen Reactions   • Duract [Bromfenac] Hives   • Duricef [Cefadroxil] Hives   • Erythromycin Hives   • Lamisil [Terbinafine] Other (See Comments)     \"IT CAUSED LUPUS\"   • Lasix [Furosemide] Other (See Comments)     PT STATES \"IM JUST VERY SENSITIVE TO IT.\"   • Mevacor [Lovastatin] Other (See Comments)     PT REPORTS \"IT JUST MADE ME FEEL SICK, I COULDN'T TOLERATE IT NOTHING SPECIFIC.\"   • Other Other (See Comments)     LAMOSIL CAUSED DRUG INDUCED LUPUS PER PATIENT   • Sulfa Antibiotics Hives   • Sulfanilamide    • Zocor [Simvastatin] Other (See Comments)     \"IT JUST MADE ME FEEL SICK, I COULDN'T TOLERATE IT. NOTHING SPECIFIC.\"        Past Medical History:   Diagnosis Date   • Anemia    • Chronic pain in left shoulder    • Diabetes mellitus (CMS/HCC)    • Gout    • Heart disease    • Hyperlipidemia    • Hypertension    • IBS (irritable bowel syndrome)    • Kidney stone    • Obesity    • PHYLLIS on CPAP    • Osteoarthritis of right elbow    • " Osteoporosis    • Primary osteoarthritis of left knee    • Rotator cuff tear    • SLE (systemic lupus erythematosus) (CMS/HCC)    • SOB (shortness of breath)      Past Surgical History:   Procedure Laterality Date   • APPENDECTOMY     • CATARACT EXTRACTION     • CHOLECYSTECTOMY     • HYSTERECTOMY       Family History   Problem Relation Age of Onset   • Stroke Mother    • Heart disease Mother    • Hypertension Mother    • Heart attack Mother    • Deep vein thrombosis Mother    • Osteoarthritis Mother    • Stroke Father    • Heart disease Father    • Hypertension Father    • Heart attack Father    • Cancer Other    • Diabetes Other    • Heart disease Other    • Hypertension Other    • Heart disease Other    • Hypertension Other    • Heart attack Other       Social History     Socioeconomic History   • Marital status:      Spouse name: Not on file   • Number of children: Not on file   • Years of education: Not on file   • Highest education level: Not on file   Social Needs   • Financial resource strain: Not on file   • Food insecurity - worry: Not on file   • Food insecurity - inability: Not on file   • Transportation needs - medical: Not on file   • Transportation needs - non-medical: Not on file   Occupational History   • Not on file   Tobacco Use   • Smoking status: Never Smoker   • Smokeless tobacco: Never Used   Substance and Sexual Activity   • Alcohol use: No   • Drug use: No   • Sexual activity: Defer   Other Topics Concern   • Not on file   Social History Narrative   • Not on file        Review of Systems   Constitutional: Negative for diaphoresis, fatigue and fever.   Respiratory: Positive for shortness of breath.    Cardiovascular: Negative for chest pain and palpitations.   Gastrointestinal: Negative for abdominal pain, nausea and vomiting.   Musculoskeletal: Positive for back pain. Negative for neck pain.   Neurological: Negative for dizziness, syncope, weakness, light-headedness and headaches.  "  Psychiatric/Behavioral: Negative for confusion.       The following portions of the patient's history were reviewed and updated as appropriate: allergies, current medications, past family history, past medical history, past social history, past surgical history and problem list.      Objective      Physical Exam  Pulse 74   Ht 167 cm (65.75\")   Wt 131 kg (288 lb 9.3 oz)   SpO2 95%   BMI 46.94 kg/m²     Body mass index is 46.94 kg/m².    GENERAL: Body habitus: morbidly obese    Lower extremity edema: Left: 2+ pitting; Right: 2+ pitting    Varicose veins:  Left: mild and moderate; Right: mild    Gait: using cane     Mental Status:  awake and alert; oriented to person, place, and time    Voice:  clear  SKIN:  Normal    Hair Growth:  Right:normal; Left:  normal  HEENT: Head: Normocephalic, atraumatic,  without obvious abnormality.  eye: normal external eye, no icterus   PULM:  Repiratory effort normal    Ortho Exam  V:  Dorsalis Pedis:  Right: 2+; Left:2+    Posterior Tibial: Right:2+; Left:2+    Capillary Refill:  Brisk  MSK:  Hand:right handed      Tibia:  Right:  tender over subcutaneous border; Left:  tender over subcutaneous border      Ankle:  Right: tender Generalized tenderness and swelling about the ankle with exquisite tenderness at the tip of the fibula and increased tenderness at the subtalar joint, ROM  symmetric and motor function  normal; Left:  non tender, ROM  symmetric and motor function  normal      Foot:  Right:  tender Over the midfoot; Left:  non tender      NEURO: Heel Walking:  Right:  unable to test; Left:  unable to test    Toe Walking:  Right:  unable to test; Left:  unable to test     Rothschild-Sami 5.07 monofilament test: normal    Lower extremity sensation: intact     Calf Atrophy:none    Motor Function: all 5/5          Medical Decision Making    Data Review:   ordered and reviewed x-rays today    Assessment:  1. Chronic gout of right ankle, unspecified cause    2. Right ankle " pain, unspecified chronicity    3. Right foot pain    4. Arthritis of ankle or foot, degenerative, right        Plan:  Chronic gout of the right ankle.  I reviewed today's x-rays, MRI from 1/26/19 and clinical findings with the patient.  On exam, she has generalized tenderness about the ankle with increased tenderness over the distal fibula and subtalar joint.  There is some swelling with no warmth or erythema.  Today's x-rays show degenerative changes and osteopenia in both the foot and ankle.  There is no evidence of acute fracture or anything more worrisome.  MRI from 1/26/19 shows significant synovium within the ankle joint.  It also shows extensive arthritic changes.  She reports 4-5 month history of pain that is resolved with oral steroids for short period of time.  I do think that the majority of her chronic pain in the ankle is secondary to gout.  The significant synovitis seen on MRI is indicative of this.  She may also be having some pain from the arthritis and some lateral ankle impingement.  We discussed further treatment recommendations including referral to rheumatology for further workup and treatment of the gout.  Also offered her injection into the subtalar joint to see if it can alleviate some of her pain.  Plan today is referral to the arthritis on her as well as right subtalar joint steroid injection.  She'll return to see us in 6 weeks to see how she has progressed or sooner if needed.    Of note, Dr. Shelley did the steroid injection as well as evaluated and examined the patient.  Please see procedure note for details.  Porsche Summers PA-C  02/08/19  1:06 PM

## 2019-02-12 DIAGNOSIS — M1A.0710 CHRONIC GOUT OF RIGHT ANKLE, UNSPECIFIED CAUSE: Primary | ICD-10-CM

## 2019-02-19 ENCOUNTER — HOSPITAL ENCOUNTER (OUTPATIENT)
Dept: GENERAL RADIOLOGY | Facility: HOSPITAL | Age: 69
Discharge: HOME | End: 2019-02-19
Admitting: PHYSICIAN ASSISTANT

## 2019-02-19 PROCEDURE — 72110 X-RAY EXAM L-2 SPINE 4/>VWS: CPT

## 2019-02-25 ENCOUNTER — OFFICE VISIT (OUTPATIENT)
Dept: NEUROSURGERY | Facility: CLINIC | Age: 69
End: 2019-02-25

## 2019-02-25 VITALS — BODY MASS INDEX: 45.16 KG/M2 | WEIGHT: 281 LBS | HEIGHT: 66 IN

## 2019-02-25 DIAGNOSIS — M54.50 CHRONIC BILATERAL LOW BACK PAIN WITHOUT SCIATICA: ICD-10-CM

## 2019-02-25 DIAGNOSIS — M41.20 SCOLIOSIS (AND KYPHOSCOLIOSIS), IDIOPATHIC: Primary | ICD-10-CM

## 2019-02-25 DIAGNOSIS — M51.36 DDD (DEGENERATIVE DISC DISEASE), LUMBAR: ICD-10-CM

## 2019-02-25 DIAGNOSIS — G89.29 CHRONIC BILATERAL LOW BACK PAIN WITHOUT SCIATICA: ICD-10-CM

## 2019-02-25 DIAGNOSIS — E66.01 CLASS 3 SEVERE OBESITY DUE TO EXCESS CALORIES WITH SERIOUS COMORBIDITY AND BODY MASS INDEX (BMI) OF 45.0 TO 49.9 IN ADULT (HCC): ICD-10-CM

## 2019-02-25 PROCEDURE — 99214 OFFICE O/P EST MOD 30 MIN: CPT | Performed by: PHYSICIAN ASSISTANT

## 2019-02-25 NOTE — PROGRESS NOTES
Answers for HPI/ROS submitted by the patient on 1/9/2019   Back pain  Chronicity: chronic  Onset: more than 1 month ago  Frequency: constantly  Progression since onset: waxing and waning  Pain location: lumbar spine  Pain quality: aching  Radiates to: right foot, right thigh  Pain - numeric: 4/10  Pain is: worse during the day  Aggravated by: sitting, standing  Stiffness is present: in the morning  abdominal pain: No  bladder incontinence: Yes  bowel incontinence: No  chest pain: No  dysuria: No  fever: No  headaches: No  leg pain: Yes  numbness: Yes  paresis: No  paresthesias: Yes  pelvic pain: No  perianal numbness: No  tingling: Yes  weakness: Yes  weight loss: No  Risk factors: lack of exercise, obesity, sedentary lifestyle    Answers for HPI/ROS submitted by the patient on 1/9/2019   Back pain  Chronicity: chronic  Onset: more than 1 month ago  Frequency: constantly  Progression since onset: waxing and waning  Pain location: lumbar spine  Pain quality: aching  Radiates to: right foot, right thigh  Pain - numeric: 4/10  Pain is: worse during the day  Aggravated by: sitting, standing  Stiffness is present: in the morning  abdominal pain: No  bladder incontinence: Yes  bowel incontinence: No  chest pain: No  dysuria: No  fever: No  headaches: No  leg pain: Yes  numbness: Yes  paresis: No  paresthesias: Yes  pelvic pain: No  perianal numbness: No  tingling: Yes  weakness: Yes  weight loss: No  Risk factors: lack of exercise, obesity, sedentary lifestyle

## 2019-02-25 NOTE — PROGRESS NOTES
"Office F/U Visit  Subjective   Patient ID: Lissett Lowry is a 68 y.o. female  [id number]    CC: back pain    Back Pain   This is a chronic problem. The current episode started more than 1 month ago. The problem occurs constantly. The problem has been waxing and waning since onset. The pain is present in the lumbar spine. The quality of the pain is described as aching. The pain radiates to the right foot and right thigh. The pain is at a severity of 4/10. The pain is worse during the day. The symptoms are aggravated by sitting and standing. Stiffness is present in the morning. Associated symptoms include bladder incontinence, leg pain, paresthesias and tingling. Pertinent negatives include no abdominal pain, bowel incontinence, chest pain, dysuria, fever, headaches, numbness, paresis, pelvic pain, perianal numbness, weakness or weight loss. Risk factors include lack of exercise, obesity and sedentary lifestyle.     Back pain is ongoing, she did feel better when she was in PT. Her right knee pain and Gout has been severe and kept her from many activities and walking since last winter.   Past Medical History:   Diagnosis Date   • Anemia    • Chronic pain in left shoulder    • Diabetes mellitus (CMS/HCC)    • Gout    • Heart disease    • Hyperlipidemia    • Hypertension    • IBS (irritable bowel syndrome)    • Kidney stone    • Obesity    • PHYLLIS on CPAP    • Osteoarthritis of right elbow    • Osteoporosis    • Primary osteoarthritis of left knee    • Rotator cuff tear    • SLE (systemic lupus erythematosus) (CMS/HCC)    • SOB (shortness of breath)        Allergies   Allergen Reactions   • Duract [Bromfenac] Hives   • Duricef [Cefadroxil] Hives   • Erythromycin Hives   • Lamisil [Terbinafine] Other (See Comments)     \"IT CAUSED LUPUS\"   • Lasix [Furosemide] Other (See Comments)     PT STATES \"IM JUST VERY SENSITIVE TO IT.\"   • Mevacor [Lovastatin] Other (See Comments)     PT REPORTS \"IT JUST MADE ME FEEL SICK, I COULDN'T " "TOLERATE IT NOTHING SPECIFIC.\"   • Other Other (See Comments)     LAMOSIL CAUSED DRUG INDUCED LUPUS PER PATIENT   • Sulfa Antibiotics Hives   • Sulfanilamide    • Zocor [Simvastatin] Other (See Comments)     \"IT JUST MADE ME FEEL SICK, I COULDN'T TOLERATE IT. NOTHING SPECIFIC.\"         Current Outpatient Medications:   •  acetaminophen (TYLENOL 8 HOUR ARTHRITIS PAIN) 650 MG 8 hr tablet, Take 650 mg by mouth Every 8 (Eight) Hours As Needed for Mild Pain ., Disp: , Rfl:   •  allopurinol (ZYLOPRIM) 100 MG tablet, Take 100 mg by mouth Daily., Disp: , Rfl:   •  AMLODIPINE BESYLATE PO, Take 5 mg by mouth Daily., Disp: , Rfl:   •  Bumetanide (BUMEX PO), Take 1 mg by mouth 2 (Two) Times a Day. PT STATES SHE FORGETS TO TAKE IT TWICE, USUALLY TAKES IT ONLY ONCE DAILY, Disp: , Rfl:   •  cyclobenzaprine (FLEXERIL) 10 MG tablet, Take 10 mg by mouth 3 (Three) Times a Day As Needed for Muscle Spasms., Disp: , Rfl:   •  famotidine (PEPCID) 20 MG tablet, Take 20 mg by mouth Daily., Disp: , Rfl:   •  fenofibrate micronized (LOFIBRA) 134 MG capsule, Take  by mouth Daily., Disp: , Rfl:   •  insulin glargine (LANTUS) 100 UNIT/ML injection, Inject  under the skin Daily. 200unit, Disp: , Rfl:   •  linagliptin (TRADJENTA) 5 MG tablet tablet, Take  by mouth Daily., Disp: , Rfl:   •  metoprolol succinate XL (TOPROL-XL) 50 MG 24 hr tablet, Take 75 mg by mouth Daily., Disp: , Rfl:   •  pioglitazone (ACTOS) 45 MG tablet, Take  by mouth Daily., Disp: , Rfl:   •  pregabalin (LYRICA) 75 MG capsule, Take 1 capsule by mouth 2 (Two) Times a Day., Disp: 180 capsule, Rfl: 1  •  rosuvastatin (CRESTOR) 5 MG tablet, Take  by mouth Daily., Disp: , Rfl:   •  sertraline (ZOLOFT) 100 MG tablet, Take  by mouth Daily., Disp: , Rfl:   •  spironolactone (ALDACTONE) 25 MG tablet, Take  by mouth Take As Directed., Disp: , Rfl:   Social History     Tobacco Use   • Smoking status: Never Smoker   • Smokeless tobacco: Never Used   Substance Use Topics   • Alcohol use: " No   • Drug use: No     Review of Systems   Constitutional: Negative for activity change, appetite change, chills, diaphoresis, fatigue, fever, unexpected weight change and weight loss.   HENT: Negative for congestion, dental problem, drooling, ear discharge, ear pain, facial swelling, hearing loss, mouth sores, nosebleeds, postnasal drip, rhinorrhea, sinus pressure, sinus pain, sneezing, sore throat, tinnitus, trouble swallowing and voice change.    Eyes: Negative for photophobia, pain, discharge, redness, itching and visual disturbance.   Respiratory: Negative for apnea, cough, choking, chest tightness, shortness of breath, wheezing and stridor.    Cardiovascular: Negative for chest pain, palpitations and leg swelling.   Gastrointestinal: Negative for abdominal distention, abdominal pain, anal bleeding, blood in stool, bowel incontinence, constipation, diarrhea, nausea, rectal pain and vomiting.   Endocrine: Negative for cold intolerance, heat intolerance, polydipsia, polyphagia and polyuria.   Genitourinary: Positive for bladder incontinence. Negative for decreased urine volume, difficulty urinating, dysuria, enuresis, flank pain, frequency, genital sores, hematuria, pelvic pain and urgency.   Musculoskeletal: Positive for arthralgias, gait problem and joint swelling. Negative for back pain, myalgias, neck pain and neck stiffness.   Skin: Negative for color change, pallor, rash and wound.   Allergic/Immunologic: Negative for environmental allergies, food allergies and immunocompromised state.   Neurological: Positive for tingling and paresthesias. Negative for dizziness, tremors, seizures, syncope, facial asymmetry, speech difficulty, weakness, light-headedness, numbness and headaches.   Hematological: Negative for adenopathy. Does not bruise/bleed easily.   Psychiatric/Behavioral: Negative for agitation, behavioral problems, confusion, decreased concentration, dysphoric mood, hallucinations, self-injury, sleep  "disturbance and suicidal ideas. The patient is not nervous/anxious and is not hyperactive.        Physical Exam  Ht 167 cm (65.75\")   Wt 127 kg (281 lb)   BMI 45.70 kg/m²     PE:  Well developed well-nourished, in no apparent distress at time of examination.  Awake, alert, oriented, conversant.  Head-normocephalic, atraumatic  EENT-sclera clear, eyelids normal, mucous membranes normal  Neck-supple  Lungs-normal expansion, no wheezing  COR- RRR  EXT-no edema    Neurologic Exam  Gait is small but steady steps, no focal weakness in the legs. Decreased ROM lumbar spine.     Independent Review of Radiographic Studies:   xrays show DDD and decreased disc space height at L2-3. There is a mild scoliotic curve at L3. There is mild 1mm change in flexion and extension at L4-5.    Medical Decision Makin. Chronic LBP. Xray shows mild scoliosis in the lumbar spine.  2. DDD at L2-3  3. obesity  4.  Physical deconditioning. Will attend PT again  5.  Gout  6. Right knee pain with degenerative changes.  7.  F/u prn.    20 Minutes spent performing face-to-face explanation of the physical and radiological findings, education to support the plan of care, risk and benefits of treatment, options and coordination of care with the patient.    Chelita Orr, PAC                  "

## 2019-05-22 ENCOUNTER — OFFICE VISIT (OUTPATIENT)
Dept: ORTHOPEDIC SURGERY | Facility: CLINIC | Age: 69
End: 2019-05-22

## 2019-05-22 VITALS — OXYGEN SATURATION: 97 % | HEIGHT: 66 IN | BODY MASS INDEX: 45 KG/M2 | HEART RATE: 57 BPM | WEIGHT: 279.98 LBS

## 2019-05-22 DIAGNOSIS — M1A.0710 CHRONIC GOUT OF RIGHT ANKLE, UNSPECIFIED CAUSE: ICD-10-CM

## 2019-05-22 DIAGNOSIS — M19.071 ARTHRITIS OF ANKLE OR FOOT, DEGENERATIVE, RIGHT: Primary | ICD-10-CM

## 2019-05-22 PROCEDURE — 99212 OFFICE O/P EST SF 10 MIN: CPT | Performed by: PHYSICIAN ASSISTANT

## 2019-05-22 RX ORDER — OXYBUTYNIN CHLORIDE 5 MG/1
5 TABLET, EXTENDED RELEASE ORAL DAILY
COMMUNITY

## 2019-05-22 RX ORDER — FEBUXOSTAT 40 MG/1
40 TABLET, FILM COATED ORAL DAILY
COMMUNITY

## 2019-05-22 NOTE — PROGRESS NOTES
"        [facility] [place]    Subjective     Patient: Lissett Lowry  : 1950    Primary Care Provider: Konstantin Gibson MD    Requesting Provider: As above    Follow-up of the Right Ankle (3 month follow up./Last injection given (subtalar) 19.)      History    Chief Complaint: Follow-up right ankle    History of Present Illness: Patient returns today for follow-up of her right ankle and foot pain following substance Francesca injection on 2 six 6/19.  Patient reports the injection helped \"medium\" she did not get custom orthotics.  She is wearing compression stockings today.  She did see rheumatology was diagnosed with Sjogren's.  She is now taking Uloric prescribed by her PCP for her gout rather than allopurinol.  No new symptoms.    Current Outpatient Medications on File Prior to Visit   Medication Sig Dispense Refill   • acetaminophen (TYLENOL 8 HOUR ARTHRITIS PAIN) 650 MG 8 hr tablet Take 650 mg by mouth Every 8 (Eight) Hours As Needed for Mild Pain .     • AMLODIPINE BESYLATE PO Take 5 mg by mouth Daily.     • Bumetanide (BUMEX PO) Take 1 mg by mouth 2 (Two) Times a Day. PT STATES SHE FORGETS TO TAKE IT TWICE, USUALLY TAKES IT ONLY ONCE DAILY     • cyclobenzaprine (FLEXERIL) 10 MG tablet Take 10 mg by mouth 3 (Three) Times a Day As Needed for Muscle Spasms.     • famotidine (PEPCID) 20 MG tablet Take 20 mg by mouth Daily.     • febuxostat (ULORIC) 40 MG tablet Take 40 mg by mouth Daily.     • fenofibrate micronized (LOFIBRA) 134 MG capsule Take  by mouth Daily.     • insulin glargine (LANTUS) 100 UNIT/ML injection Inject  under the skin Daily. 200unit     • linagliptin (TRADJENTA) 5 MG tablet tablet Take  by mouth Daily.     • metoprolol succinate XL (TOPROL-XL) 50 MG 24 hr tablet Take 75 mg by mouth Daily.     • oxybutynin XL (DITROPAN-XL) 10 MG 24 hr tablet Take 10 mg by mouth Daily.     • pioglitazone (ACTOS) 45 MG tablet Take  by mouth Daily.     • pregabalin (LYRICA) 75 MG " "capsule Take 1 capsule by mouth 2 (Two) Times a Day. 180 capsule 1   • rosuvastatin (CRESTOR) 5 MG tablet Take  by mouth Daily.     • sertraline (ZOLOFT) 100 MG tablet Take  by mouth Daily.     • spironolactone (ALDACTONE) 25 MG tablet Take  by mouth Take As Directed.     • [DISCONTINUED] allopurinol (ZYLOPRIM) 100 MG tablet Take 100 mg by mouth Daily.       No current facility-administered medications on file prior to visit.       Allergies   Allergen Reactions   • Duract [Bromfenac] Hives   • Duricef [Cefadroxil] Hives   • Erythromycin Hives   • Lamisil [Terbinafine] Other (See Comments)     \"IT CAUSED LUPUS\"   • Lasix [Furosemide] Other (See Comments)     PT STATES \"IM JUST VERY SENSITIVE TO IT.\"   • Mevacor [Lovastatin] Other (See Comments)     PT REPORTS \"IT JUST MADE ME FEEL SICK, I COULDN'T TOLERATE IT NOTHING SPECIFIC.\"   • Other Other (See Comments)     LAMOSIL CAUSED DRUG INDUCED LUPUS PER PATIENT   • Sulfa Antibiotics Hives   • Sulfanilamide    • Zocor [Simvastatin] Other (See Comments)     \"IT JUST MADE ME FEEL SICK, I COULDN'T TOLERATE IT. NOTHING SPECIFIC.\"        Past Medical History:   Diagnosis Date   • Anemia    • Chronic pain in left shoulder    • Diabetes mellitus (CMS/HCC)    • Gout    • Heart disease    • Hyperlipidemia    • Hypertension    • IBS (irritable bowel syndrome)    • Kidney stone    • Obesity    • PHYLLIS on CPAP    • Osteoarthritis of right elbow    • Osteoporosis    • Primary osteoarthritis of left knee    • Rotator cuff tear    • SLE (systemic lupus erythematosus) (CMS/HCC)    • SOB (shortness of breath)      Past Surgical History:   Procedure Laterality Date   • APPENDECTOMY     • CATARACT EXTRACTION     • CHOLECYSTECTOMY     • HYSTERECTOMY       Family History   Problem Relation Age of Onset   • Stroke Mother    • Heart disease Mother    • Hypertension Mother    • Heart attack Mother    • Deep vein thrombosis Mother    • Osteoarthritis Mother    • Stroke Father    • Heart disease " "Father    • Hypertension Father    • Heart attack Father    • Cancer Other    • Diabetes Other    • Heart disease Other    • Hypertension Other    • Heart disease Other    • Hypertension Other    • Heart attack Other       Social History     Socioeconomic History   • Marital status:      Spouse name: Not on file   • Number of children: Not on file   • Years of education: Not on file   • Highest education level: Not on file   Tobacco Use   • Smoking status: Never Smoker   • Smokeless tobacco: Never Used   Substance and Sexual Activity   • Alcohol use: No   • Drug use: No   • Sexual activity: Defer        Review of Systems   Constitutional: Positive for activity change and fatigue.   HENT: Negative.    Eyes: Negative.    Respiratory: Positive for shortness of breath.    Cardiovascular: Positive for leg swelling.   Gastrointestinal: Negative.    Endocrine: Negative.    Genitourinary: Positive for urgency.   Musculoskeletal: Positive for back pain, gait problem and joint swelling.   Skin: Negative.    Neurological: Positive for dizziness.   Hematological: Negative.    Psychiatric/Behavioral: Negative.        The following portions of the patient's history were reviewed and updated as appropriate: allergies, current medications, past family history, past medical history, past social history, past surgical history and problem list.      Objective      Physical Exam  Pulse 57   Ht 167 cm (65.75\")   Wt 127 kg (279 lb 15.8 oz)   SpO2 97%   Breastfeeding? No   BMI 45.53 kg/m²     Body mass index is 45.53 kg/m².    Patient is well developed, well nourished and in no acute distress.  Alert and oriented x 3.    Ortho Exam  Right ankle exam:  1+ pitting edema in the right lower extremity  Generalized tenderness with most tender area at the tip of the fibula  NVI with pulses 2+    Medical Decision Making    Data Review:   none    Assessment:  1. Arthritis of ankle or foot, degenerative, right    2. Chronic gout of " right ankle, unspecified cause        Plan:  Right ankle arthritis, foot arthritis with chronic gout.   Patient reports improved pain from injection.  We discussed the importance of continued compression stockings to help with her pain and swelling.  Patient has been asked if there is anything we can do regarding the swelling in her ankle such as aspiration.  I explained that there is not this is secondary to edema as well as likely some synovitis from her chronic gout.  I did encourage her to get the custom orthotics with a medial wedge as she is most tender at the tip of the fibula secondary to impingement.  She will return to see us as needed.      Porsche Summers PA-C  05/22/19  12:49 PM

## 2019-08-20 ENCOUNTER — LAB REQUISITION (OUTPATIENT)
Dept: LAB | Facility: HOSPITAL | Age: 69
End: 2019-08-20

## 2019-08-20 DIAGNOSIS — Z00.00 ROUTINE GENERAL MEDICAL EXAMINATION AT A HEALTH CARE FACILITY: ICD-10-CM

## 2019-08-20 PROCEDURE — 36415 COLL VENOUS BLD VENIPUNCTURE: CPT | Performed by: ORTHOPAEDIC SURGERY

## 2019-08-22 ENCOUNTER — HOSPITAL ENCOUNTER (OUTPATIENT)
Dept: GENERAL RADIOLOGY | Facility: HOSPITAL | Age: 69
Discharge: HOME | End: 2019-08-22
Admitting: ORTHOPAEDIC SURGERY

## 2019-08-22 ENCOUNTER — APPOINTMENT (OUTPATIENT)
Dept: PREADMISSION TESTING | Facility: HOSPITAL | Age: 69
End: 2019-08-22

## 2019-08-22 VITALS — HEIGHT: 66 IN | WEIGHT: 284.39 LBS | BODY MASS INDEX: 45.71 KG/M2

## 2019-08-22 DIAGNOSIS — M41.20 SCOLIOSIS (AND KYPHOSCOLIOSIS), IDIOPATHIC: ICD-10-CM

## 2019-08-22 DIAGNOSIS — G89.29 CHRONIC BILATERAL LOW BACK PAIN WITHOUT SCIATICA: ICD-10-CM

## 2019-08-22 DIAGNOSIS — M51.36 DDD (DEGENERATIVE DISC DISEASE), LUMBAR: Primary | ICD-10-CM

## 2019-08-22 DIAGNOSIS — M79.604 RIGHT LEG PAIN: ICD-10-CM

## 2019-08-22 DIAGNOSIS — M54.50 CHRONIC BILATERAL LOW BACK PAIN WITHOUT SCIATICA: ICD-10-CM

## 2019-08-22 DIAGNOSIS — Z01.818 PREOP EXAMINATION: ICD-10-CM

## 2019-08-22 LAB
ABO GROUP BLD: NORMAL
BASOPHILS # BLD AUTO: 0.04 10*3/MM3 (ref 0–0.2)
BASOPHILS NFR BLD AUTO: 0.6 % (ref 0–1.5)
BLD GP AB SCN SERPL QL: NEGATIVE
DEPRECATED RDW RBC AUTO: 47.5 FL (ref 37–54)
EOSINOPHIL # BLD AUTO: 0 10*3/MM3 (ref 0–0.4)
EOSINOPHIL NFR BLD AUTO: 0 % (ref 0.3–6.2)
ERYTHROCYTE [DISTWIDTH] IN BLOOD BY AUTOMATED COUNT: 13.5 % (ref 12.3–15.4)
HCT VFR BLD AUTO: 38.2 % (ref 34–46.6)
HGB BLD-MCNC: 11.8 G/DL (ref 12–15.9)
IMM GRANULOCYTES # BLD AUTO: 0.02 10*3/MM3 (ref 0–0.05)
IMM GRANULOCYTES NFR BLD AUTO: 0.3 % (ref 0–0.5)
LYMPHOCYTES # BLD AUTO: 1.51 10*3/MM3 (ref 0.7–3.1)
LYMPHOCYTES NFR BLD AUTO: 23.3 % (ref 19.6–45.3)
MCH RBC QN AUTO: 29.2 PG (ref 26.6–33)
MCHC RBC AUTO-ENTMCNC: 30.9 G/DL (ref 31.5–35.7)
MCV RBC AUTO: 94.6 FL (ref 79–97)
MONOCYTES # BLD AUTO: 0.59 10*3/MM3 (ref 0.1–0.9)
MONOCYTES NFR BLD AUTO: 9.1 % (ref 5–12)
NEUTROPHILS # BLD AUTO: 4.32 10*3/MM3 (ref 1.7–7)
NEUTROPHILS NFR BLD AUTO: 66.7 % (ref 42.7–76)
NRBC BLD AUTO-RTO: 0 /100 WBC (ref 0–0.2)
PLATELET # BLD AUTO: 251 10*3/MM3 (ref 140–450)
PMV BLD AUTO: 10.9 FL (ref 6–12)
RBC # BLD AUTO: 4.04 10*6/MM3 (ref 3.77–5.28)
RH BLD: POSITIVE
WBC NRBC COR # BLD: 6.48 10*3/MM3 (ref 3.4–10.8)

## 2019-08-22 PROCEDURE — 71046 X-RAY EXAM CHEST 2 VIEWS: CPT

## 2019-08-22 PROCEDURE — 86850 RBC ANTIBODY SCREEN: CPT | Performed by: ORTHOPAEDIC SURGERY

## 2019-08-22 PROCEDURE — 85025 COMPLETE CBC W/AUTO DIFF WBC: CPT | Performed by: ORTHOPAEDIC SURGERY

## 2019-08-22 PROCEDURE — 86901 BLOOD TYPING SEROLOGIC RH(D): CPT | Performed by: ORTHOPAEDIC SURGERY

## 2019-08-22 PROCEDURE — 86900 BLOOD TYPING SEROLOGIC ABO: CPT | Performed by: ORTHOPAEDIC SURGERY

## 2019-08-22 PROCEDURE — 36415 COLL VENOUS BLD VENIPUNCTURE: CPT

## 2019-08-22 PROCEDURE — 93005 ELECTROCARDIOGRAM TRACING: CPT

## 2019-08-22 PROCEDURE — 93010 ELECTROCARDIOGRAM REPORT: CPT | Performed by: INTERNAL MEDICINE

## 2019-08-22 RX ORDER — HYDROXYCHLOROQUINE SULFATE 200 MG/1
200 TABLET, FILM COATED ORAL 2 TIMES DAILY
COMMUNITY

## 2019-08-22 RX ORDER — PREGABALIN 100 MG/1
100 CAPSULE ORAL 2 TIMES DAILY
COMMUNITY

## 2019-08-22 RX ORDER — ACETAMINOPHEN 500 MG
500 TABLET ORAL DAILY
COMMUNITY

## 2019-08-22 RX ORDER — DULOXETIN HYDROCHLORIDE 60 MG/1
60 CAPSULE, DELAYED RELEASE ORAL DAILY
COMMUNITY

## 2019-08-22 ASSESSMENT — KOOS JR
KOOS JR SCORE: 15.939
KOOS JR SCORE: 26

## 2019-08-22 NOTE — PAT
Patient to apply Chlorhexadine wipes  to surgical area (as instructed) the night before procedure and the AM of procedure. Wipes provided.    Patient attended joint replacement class today during PAT visit.    Patient instructed to drink 20 ounces (or until full) of Gatorade and it needs to be completed 1 hour before given arrival time for procedure (NO RED Gatorade)    Patient verbalized understanding.    Patient instructed to bring CPAP mask and tubing to the hospital for overnight stay.  Explained that it is not necessary to bring their CPAP machine to the hospital instead a CPAP machine will be provided for use by the hospital. If patient knows their CPAP settings, those settings will be implemented.  If not, the CPAP machine will be utilized on the auto setting using their mask and tubing.    Patient verbalized understanding.    A1c, CMP, Sed rate and CRP labs from [facility] placed on chart.

## 2019-08-26 ENCOUNTER — HOSPITAL ENCOUNTER (INPATIENT)
Facility: HOSPITAL | Age: 69
LOS: 2 days | Discharge: HOME HEALTH | End: 2019-08-28
Attending: ORTHOPAEDIC SURGERY | Admitting: ORTHOPAEDIC SURGERY

## 2019-08-26 ENCOUNTER — APPOINTMENT (OUTPATIENT)
Dept: GENERAL RADIOLOGY | Facility: HOSPITAL | Age: 69
End: 2019-08-26

## 2019-08-26 ENCOUNTER — ANESTHESIA EVENT (OUTPATIENT)
Dept: PERIOP | Facility: HOSPITAL | Age: 69
End: 2019-08-26

## 2019-08-26 ENCOUNTER — ANESTHESIA (OUTPATIENT)
Dept: PERIOP | Facility: HOSPITAL | Age: 69
End: 2019-08-26

## 2019-08-26 DIAGNOSIS — Z74.09 IMPAIRED MOBILITY AND ADLS: ICD-10-CM

## 2019-08-26 DIAGNOSIS — M51.36 DDD (DEGENERATIVE DISC DISEASE), LUMBAR: ICD-10-CM

## 2019-08-26 DIAGNOSIS — G89.29 CHRONIC BILATERAL LOW BACK PAIN WITHOUT SCIATICA: ICD-10-CM

## 2019-08-26 DIAGNOSIS — R53.81 PHYSICAL DECONDITIONING: ICD-10-CM

## 2019-08-26 DIAGNOSIS — Z78.9 IMPAIRED MOBILITY AND ADLS: ICD-10-CM

## 2019-08-26 DIAGNOSIS — Z96.652 STATUS POST TOTAL LEFT KNEE REPLACEMENT: Primary | ICD-10-CM

## 2019-08-26 DIAGNOSIS — M54.50 CHRONIC BILATERAL LOW BACK PAIN WITHOUT SCIATICA: ICD-10-CM

## 2019-08-26 PROBLEM — E78.5 HLD (HYPERLIPIDEMIA): Status: ACTIVE | Noted: 2019-08-26

## 2019-08-26 PROBLEM — M17.12 ARTHRITIS OF LEFT KNEE: Status: ACTIVE | Noted: 2019-08-26

## 2019-08-26 PROBLEM — N18.9 CKD (CHRONIC KIDNEY DISEASE): Status: ACTIVE | Noted: 2019-08-26

## 2019-08-26 PROBLEM — Z99.89 OSA ON CPAP: Status: ACTIVE | Noted: 2019-08-26

## 2019-08-26 PROBLEM — G47.33 OSA ON CPAP: Status: ACTIVE | Noted: 2019-08-26

## 2019-08-26 PROBLEM — I10 HTN (HYPERTENSION): Status: ACTIVE | Noted: 2019-08-26

## 2019-08-26 LAB
ABO GROUP BLD: NORMAL
BLD GP AB SCN SERPL QL: NEGATIVE
GLUCOSE BLDC GLUCOMTR-MCNC: 190 MG/DL (ref 70–130)
GLUCOSE BLDC GLUCOMTR-MCNC: 208 MG/DL (ref 70–130)
GLUCOSE BLDC GLUCOMTR-MCNC: 83 MG/DL (ref 70–130)
GLUCOSE BLDC GLUCOMTR-MCNC: 87 MG/DL (ref 70–130)
POTASSIUM BLD-SCNC: 4 MMOL/L (ref 3.5–5.2)
RH BLD: POSITIVE
T&S EXPIRATION DATE: NORMAL

## 2019-08-26 PROCEDURE — C1776 JOINT DEVICE (IMPLANTABLE): HCPCS | Performed by: ORTHOPAEDIC SURGERY

## 2019-08-26 PROCEDURE — 73560 X-RAY EXAM OF KNEE 1 OR 2: CPT

## 2019-08-26 PROCEDURE — 25010000002 ROPIVACAINE PER 1 MG: Performed by: NURSE ANESTHETIST, CERTIFIED REGISTERED

## 2019-08-26 PROCEDURE — 97116 GAIT TRAINING THERAPY: CPT

## 2019-08-26 PROCEDURE — 82962 GLUCOSE BLOOD TEST: CPT

## 2019-08-26 PROCEDURE — 63710000001 INSULIN DETEMIR PER 5 UNITS: Performed by: NURSE PRACTITIONER

## 2019-08-26 PROCEDURE — 63710000001 INSULIN LISPRO (HUMAN) PER 5 UNITS: Performed by: NURSE PRACTITIONER

## 2019-08-26 PROCEDURE — 63710000001 PROMETHAZINE PER 12.5 MG: Performed by: ORTHOPAEDIC SURGERY

## 2019-08-26 PROCEDURE — 25010000002 HYDROMORPHONE PER 4 MG: Performed by: ORTHOPAEDIC SURGERY

## 2019-08-26 PROCEDURE — 86900 BLOOD TYPING SEROLOGIC ABO: CPT | Performed by: ORTHOPAEDIC SURGERY

## 2019-08-26 PROCEDURE — 97161 PT EVAL LOW COMPLEX 20 MIN: CPT

## 2019-08-26 PROCEDURE — 25010000002 ONDANSETRON PER 1 MG: Performed by: NURSE ANESTHETIST, CERTIFIED REGISTERED

## 2019-08-26 PROCEDURE — 84132 ASSAY OF SERUM POTASSIUM: CPT | Performed by: ANESTHESIOLOGY

## 2019-08-26 PROCEDURE — 25010000002 DEXAMETHASONE PER 1 MG: Performed by: NURSE ANESTHETIST, CERTIFIED REGISTERED

## 2019-08-26 PROCEDURE — 25010000002 ROPIVACAINE PER 1 MG: Performed by: ORTHOPAEDIC SURGERY

## 2019-08-26 PROCEDURE — 25010000002 PROPOFOL 10 MG/ML EMULSION: Performed by: NURSE ANESTHETIST, CERTIFIED REGISTERED

## 2019-08-26 PROCEDURE — 25010000002 VANCOMYCIN: Performed by: ORTHOPAEDIC SURGERY

## 2019-08-26 PROCEDURE — 25010000002 PROPOFOL 1000 MG/ML EMULSION: Performed by: NURSE ANESTHETIST, CERTIFIED REGISTERED

## 2019-08-26 PROCEDURE — 25010000002 FENTANYL CITRATE (PF) 100 MCG/2ML SOLUTION: Performed by: NURSE ANESTHETIST, CERTIFIED REGISTERED

## 2019-08-26 PROCEDURE — 86923 COMPATIBILITY TEST ELECTRIC: CPT

## 2019-08-26 PROCEDURE — C1713 ANCHOR/SCREW BN/BN,TIS/BN: HCPCS | Performed by: ORTHOPAEDIC SURGERY

## 2019-08-26 PROCEDURE — 86901 BLOOD TYPING SEROLOGIC RH(D): CPT | Performed by: ORTHOPAEDIC SURGERY

## 2019-08-26 PROCEDURE — 0SRD0J9 REPLACEMENT OF LEFT KNEE JOINT WITH SYNTHETIC SUBSTITUTE, CEMENTED, OPEN APPROACH: ICD-10-PCS | Performed by: ORTHOPAEDIC SURGERY

## 2019-08-26 PROCEDURE — 25010000002 VANCOMYCIN 10 G RECONSTITUTED SOLUTION: Performed by: ORTHOPAEDIC SURGERY

## 2019-08-26 PROCEDURE — 86850 RBC ANTIBODY SCREEN: CPT | Performed by: ORTHOPAEDIC SURGERY

## 2019-08-26 DEVICE — LEGION CRUCIATE RETAINING HIGH                                    FLEX HIGHLY CROSS LINKED                                    POLYETHYLENE SIZE 3-4 9MM
Type: IMPLANTABLE DEVICE | Site: KNEE | Status: FUNCTIONAL
Brand: LEGION

## 2019-08-26 DEVICE — GENESIS II NON-POROUS TIBIAL                                    BASEPLATE SIZE 4 LEFT
Type: IMPLANTABLE DEVICE | Site: KNEE | Status: FUNCTIONAL
Brand: GENESIS II

## 2019-08-26 DEVICE — CMT BONE PALACOS R HI/VISC 1X40: Type: IMPLANTABLE DEVICE | Site: KNEE | Status: FUNCTIONAL

## 2019-08-26 DEVICE — IMPLANTABLE DEVICE: Type: IMPLANTABLE DEVICE | Site: KNEE | Status: FUNCTIONAL

## 2019-08-26 DEVICE — LEGION CRUCIATE RETAINING OXINIUM                                    FEMORAL SIZE 5 LEFT
Type: IMPLANTABLE DEVICE | Site: KNEE | Status: FUNCTIONAL
Brand: LEGION

## 2019-08-26 DEVICE — GENESIS II RESURFACING PATELLAR                                    PROSTHESIS  32MM
Type: IMPLANTABLE DEVICE | Site: PATELLA | Status: FUNCTIONAL
Brand: GENESIS II

## 2019-08-26 RX ORDER — SODIUM CHLORIDE, SODIUM LACTATE, POTASSIUM CHLORIDE, CALCIUM CHLORIDE 600; 310; 30; 20 MG/100ML; MG/100ML; MG/100ML; MG/100ML
100 INJECTION, SOLUTION INTRAVENOUS CONTINUOUS
Status: DISCONTINUED | OUTPATIENT
Start: 2019-08-26 | End: 2019-08-28 | Stop reason: HOSPADM

## 2019-08-26 RX ORDER — SODIUM CHLORIDE 9 MG/ML
150 INJECTION, SOLUTION INTRAVENOUS CONTINUOUS
Status: DISCONTINUED | OUTPATIENT
Start: 2019-08-26 | End: 2019-08-28 | Stop reason: HOSPADM

## 2019-08-26 RX ORDER — SODIUM CHLORIDE 0.9 % (FLUSH) 0.9 %
3 SYRINGE (ML) INJECTION EVERY 12 HOURS SCHEDULED
Status: DISCONTINUED | OUTPATIENT
Start: 2019-08-26 | End: 2019-08-28 | Stop reason: HOSPADM

## 2019-08-26 RX ORDER — PREGABALIN 50 MG/1
100 CAPSULE ORAL EVERY 12 HOURS SCHEDULED
Status: DISCONTINUED | OUTPATIENT
Start: 2019-08-26 | End: 2019-08-28 | Stop reason: HOSPADM

## 2019-08-26 RX ORDER — AMLODIPINE BESYLATE 5 MG/1
5 TABLET ORAL DAILY
Status: DISCONTINUED | OUTPATIENT
Start: 2019-08-26 | End: 2019-08-28 | Stop reason: HOSPADM

## 2019-08-26 RX ORDER — ONDANSETRON 2 MG/ML
INJECTION INTRAMUSCULAR; INTRAVENOUS AS NEEDED
Status: DISCONTINUED | OUTPATIENT
Start: 2019-08-26 | End: 2019-08-26 | Stop reason: SURG

## 2019-08-26 RX ORDER — SODIUM CHLORIDE, SODIUM LACTATE, POTASSIUM CHLORIDE, CALCIUM CHLORIDE 600; 310; 30; 20 MG/100ML; MG/100ML; MG/100ML; MG/100ML
9 INJECTION, SOLUTION INTRAVENOUS CONTINUOUS
Status: DISCONTINUED | OUTPATIENT
Start: 2019-08-26 | End: 2019-08-26

## 2019-08-26 RX ORDER — FAMOTIDINE 20 MG/1
20 TABLET, FILM COATED ORAL ONCE
Status: COMPLETED | OUTPATIENT
Start: 2019-08-26 | End: 2019-08-26

## 2019-08-26 RX ORDER — DULOXETIN HYDROCHLORIDE 60 MG/1
60 CAPSULE, DELAYED RELEASE ORAL DAILY
Status: DISCONTINUED | OUTPATIENT
Start: 2019-08-26 | End: 2019-08-28 | Stop reason: HOSPADM

## 2019-08-26 RX ORDER — SODIUM CHLORIDE 0.9 % (FLUSH) 0.9 %
3 SYRINGE (ML) INJECTION EVERY 12 HOURS SCHEDULED
Status: DISCONTINUED | OUTPATIENT
Start: 2019-08-26 | End: 2019-08-26 | Stop reason: HOSPADM

## 2019-08-26 RX ORDER — SODIUM CHLORIDE 0.9 % (FLUSH) 0.9 %
3-10 SYRINGE (ML) INJECTION AS NEEDED
Status: DISCONTINUED | OUTPATIENT
Start: 2019-08-26 | End: 2019-08-26

## 2019-08-26 RX ORDER — ACETAMINOPHEN 650 MG
TABLET, EXTENDED RELEASE ORAL AS NEEDED
Status: DISCONTINUED | OUTPATIENT
Start: 2019-08-26 | End: 2019-08-26 | Stop reason: HOSPADM

## 2019-08-26 RX ORDER — SODIUM CHLORIDE 0.9 % (FLUSH) 0.9 %
3 SYRINGE (ML) INJECTION EVERY 12 HOURS SCHEDULED
Status: DISCONTINUED | OUTPATIENT
Start: 2019-08-26 | End: 2019-08-26

## 2019-08-26 RX ORDER — FAMOTIDINE 20 MG/1
20 TABLET, FILM COATED ORAL ONCE
Status: DISCONTINUED | OUTPATIENT
Start: 2019-08-26 | End: 2019-08-26

## 2019-08-26 RX ORDER — NALOXONE HCL 0.4 MG/ML
0.4 VIAL (ML) INJECTION AS NEEDED
Status: DISCONTINUED | OUTPATIENT
Start: 2019-08-26 | End: 2019-08-26 | Stop reason: HOSPADM

## 2019-08-26 RX ORDER — ASPIRIN 325 MG
325 TABLET ORAL DAILY
Status: DISCONTINUED | OUTPATIENT
Start: 2019-08-27 | End: 2019-08-28 | Stop reason: HOSPADM

## 2019-08-26 RX ORDER — NALOXONE HCL 0.4 MG/ML
0.1 VIAL (ML) INJECTION
Status: DISCONTINUED | OUTPATIENT
Start: 2019-08-26 | End: 2019-08-28 | Stop reason: HOSPADM

## 2019-08-26 RX ORDER — DEXTROSE MONOHYDRATE 25 G/50ML
25 INJECTION, SOLUTION INTRAVENOUS
Status: DISCONTINUED | OUTPATIENT
Start: 2019-08-26 | End: 2019-08-28 | Stop reason: HOSPADM

## 2019-08-26 RX ORDER — FENTANYL CITRATE 50 UG/ML
50 INJECTION, SOLUTION INTRAMUSCULAR; INTRAVENOUS
Status: DISCONTINUED | OUTPATIENT
Start: 2019-08-26 | End: 2019-08-26 | Stop reason: HOSPADM

## 2019-08-26 RX ORDER — ROSUVASTATIN CALCIUM 10 MG/1
5 TABLET, COATED ORAL DAILY
Status: DISCONTINUED | OUTPATIENT
Start: 2019-08-26 | End: 2019-08-28 | Stop reason: HOSPADM

## 2019-08-26 RX ORDER — FAMOTIDINE 20 MG/1
20 TABLET, FILM COATED ORAL DAILY
Status: DISCONTINUED | OUTPATIENT
Start: 2019-08-26 | End: 2019-08-28 | Stop reason: HOSPADM

## 2019-08-26 RX ORDER — PROPOFOL 10 MG/ML
VIAL (ML) INTRAVENOUS AS NEEDED
Status: DISCONTINUED | OUTPATIENT
Start: 2019-08-26 | End: 2019-08-26 | Stop reason: SURG

## 2019-08-26 RX ORDER — BUPIVACAINE HYDROCHLORIDE 5 MG/ML
INJECTION, SOLUTION PERINEURAL
Status: COMPLETED | OUTPATIENT
Start: 2019-08-26 | End: 2019-08-26

## 2019-08-26 RX ORDER — FAMOTIDINE 10 MG/ML
20 INJECTION, SOLUTION INTRAVENOUS ONCE
Status: DISCONTINUED | OUTPATIENT
Start: 2019-08-26 | End: 2019-08-26

## 2019-08-26 RX ORDER — PREGABALIN 75 MG/1
75 CAPSULE ORAL ONCE
Status: COMPLETED | OUTPATIENT
Start: 2019-08-26 | End: 2019-08-26

## 2019-08-26 RX ORDER — BISACODYL 5 MG/1
10 TABLET, DELAYED RELEASE ORAL DAILY PRN
Status: DISCONTINUED | OUTPATIENT
Start: 2019-08-26 | End: 2019-08-28 | Stop reason: HOSPADM

## 2019-08-26 RX ORDER — ONDANSETRON 2 MG/ML
4 INJECTION INTRAMUSCULAR; INTRAVENOUS EVERY 6 HOURS PRN
Status: DISCONTINUED | OUTPATIENT
Start: 2019-08-26 | End: 2019-08-28 | Stop reason: HOSPADM

## 2019-08-26 RX ORDER — LABETALOL HYDROCHLORIDE 5 MG/ML
10 INJECTION, SOLUTION INTRAVENOUS EVERY 4 HOURS PRN
Status: DISCONTINUED | OUTPATIENT
Start: 2019-08-26 | End: 2019-08-28 | Stop reason: HOSPADM

## 2019-08-26 RX ORDER — MEPERIDINE HYDROCHLORIDE 25 MG/ML
12.5 INJECTION INTRAMUSCULAR; INTRAVENOUS; SUBCUTANEOUS
Status: DISCONTINUED | OUTPATIENT
Start: 2019-08-26 | End: 2019-08-26 | Stop reason: HOSPADM

## 2019-08-26 RX ORDER — DOCUSATE SODIUM 100 MG/1
100 CAPSULE, LIQUID FILLED ORAL 2 TIMES DAILY
Status: DISCONTINUED | OUTPATIENT
Start: 2019-08-26 | End: 2019-08-28 | Stop reason: HOSPADM

## 2019-08-26 RX ORDER — BISACODYL 10 MG
10 SUPPOSITORY, RECTAL RECTAL DAILY PRN
Status: DISCONTINUED | OUTPATIENT
Start: 2019-08-26 | End: 2019-08-28 | Stop reason: HOSPADM

## 2019-08-26 RX ORDER — NICOTINE POLACRILEX 4 MG
15 LOZENGE BUCCAL
Status: DISCONTINUED | OUTPATIENT
Start: 2019-08-26 | End: 2019-08-28 | Stop reason: HOSPADM

## 2019-08-26 RX ORDER — LABETALOL HYDROCHLORIDE 5 MG/ML
5 INJECTION, SOLUTION INTRAVENOUS
Status: DISCONTINUED | OUTPATIENT
Start: 2019-08-26 | End: 2019-08-26 | Stop reason: HOSPADM

## 2019-08-26 RX ORDER — EPHEDRINE SULFATE 50 MG/ML
5 INJECTION, SOLUTION INTRAVENOUS ONCE AS NEEDED
Status: DISCONTINUED | OUTPATIENT
Start: 2019-08-26 | End: 2019-08-26 | Stop reason: HOSPADM

## 2019-08-26 RX ORDER — ROPIVACAINE HYDROCHLORIDE 5 MG/ML
INJECTION, SOLUTION EPIDURAL; INFILTRATION; PERINEURAL AS NEEDED
Status: DISCONTINUED | OUTPATIENT
Start: 2019-08-26 | End: 2019-08-26 | Stop reason: HOSPADM

## 2019-08-26 RX ORDER — LIDOCAINE HYDROCHLORIDE 10 MG/ML
0.5 INJECTION, SOLUTION EPIDURAL; INFILTRATION; INTRACAUDAL; PERINEURAL ONCE AS NEEDED
Status: DISCONTINUED | OUTPATIENT
Start: 2019-08-26 | End: 2019-08-26

## 2019-08-26 RX ORDER — DEXAMETHASONE SODIUM PHOSPHATE 4 MG/ML
INJECTION, SOLUTION INTRA-ARTICULAR; INTRALESIONAL; INTRAMUSCULAR; INTRAVENOUS; SOFT TISSUE AS NEEDED
Status: DISCONTINUED | OUTPATIENT
Start: 2019-08-26 | End: 2019-08-26 | Stop reason: SURG

## 2019-08-26 RX ORDER — ONDANSETRON 2 MG/ML
4 INJECTION INTRAMUSCULAR; INTRAVENOUS ONCE AS NEEDED
Status: DISCONTINUED | OUTPATIENT
Start: 2019-08-26 | End: 2019-08-26 | Stop reason: HOSPADM

## 2019-08-26 RX ORDER — OXYBUTYNIN CHLORIDE 5 MG/1
5 TABLET, EXTENDED RELEASE ORAL DAILY
Status: DISCONTINUED | OUTPATIENT
Start: 2019-08-26 | End: 2019-08-28 | Stop reason: HOSPADM

## 2019-08-26 RX ORDER — HYDROMORPHONE HYDROCHLORIDE 1 MG/ML
0.5 INJECTION, SOLUTION INTRAMUSCULAR; INTRAVENOUS; SUBCUTANEOUS
Status: DISCONTINUED | OUTPATIENT
Start: 2019-08-26 | End: 2019-08-28 | Stop reason: HOSPADM

## 2019-08-26 RX ORDER — PROMETHAZINE HYDROCHLORIDE 12.5 MG/1
12.5 TABLET ORAL EVERY 6 HOURS PRN
Status: DISCONTINUED | OUTPATIENT
Start: 2019-08-26 | End: 2019-08-28 | Stop reason: HOSPADM

## 2019-08-26 RX ORDER — FEBUXOSTAT 40 MG/1
40 TABLET, FILM COATED ORAL DAILY
Status: DISCONTINUED | OUTPATIENT
Start: 2019-08-26 | End: 2019-08-28 | Stop reason: HOSPADM

## 2019-08-26 RX ORDER — BUPIVACAINE HYDROCHLORIDE 2.5 MG/ML
INJECTION, SOLUTION EPIDURAL; INFILTRATION; INTRACAUDAL
Status: COMPLETED | OUTPATIENT
Start: 2019-08-26 | End: 2019-08-26

## 2019-08-26 RX ORDER — SODIUM CHLORIDE 9 MG/ML
9 INJECTION, SOLUTION INTRAVENOUS CONTINUOUS
Status: DISCONTINUED | OUTPATIENT
Start: 2019-08-26 | End: 2019-08-28 | Stop reason: HOSPADM

## 2019-08-26 RX ORDER — LIDOCAINE HYDROCHLORIDE 10 MG/ML
0.5 INJECTION, SOLUTION EPIDURAL; INFILTRATION; INTRACAUDAL; PERINEURAL ONCE AS NEEDED
Status: COMPLETED | OUTPATIENT
Start: 2019-08-26 | End: 2019-08-26

## 2019-08-26 RX ORDER — CYCLOBENZAPRINE HCL 10 MG
10 TABLET ORAL 3 TIMES DAILY PRN
Status: DISCONTINUED | OUTPATIENT
Start: 2019-08-26 | End: 2019-08-28 | Stop reason: HOSPADM

## 2019-08-26 RX ORDER — MAGNESIUM HYDROXIDE 1200 MG/15ML
LIQUID ORAL AS NEEDED
Status: DISCONTINUED | OUTPATIENT
Start: 2019-08-26 | End: 2019-08-26 | Stop reason: HOSPADM

## 2019-08-26 RX ORDER — FENTANYL CITRATE 50 UG/ML
INJECTION, SOLUTION INTRAMUSCULAR; INTRAVENOUS AS NEEDED
Status: DISCONTINUED | OUTPATIENT
Start: 2019-08-26 | End: 2019-08-26 | Stop reason: SURG

## 2019-08-26 RX ORDER — HYDROCODONE BITARTRATE AND ACETAMINOPHEN 5; 325 MG/1; MG/1
1 TABLET ORAL EVERY 4 HOURS PRN
Status: DISCONTINUED | OUTPATIENT
Start: 2019-08-26 | End: 2019-08-28 | Stop reason: HOSPADM

## 2019-08-26 RX ORDER — SODIUM CHLORIDE 0.9 % (FLUSH) 0.9 %
3-10 SYRINGE (ML) INJECTION AS NEEDED
Status: DISCONTINUED | OUTPATIENT
Start: 2019-08-26 | End: 2019-08-28 | Stop reason: HOSPADM

## 2019-08-26 RX ORDER — HYDROMORPHONE HYDROCHLORIDE 1 MG/ML
0.5 INJECTION, SOLUTION INTRAMUSCULAR; INTRAVENOUS; SUBCUTANEOUS
Status: DISCONTINUED | OUTPATIENT
Start: 2019-08-26 | End: 2019-08-26 | Stop reason: HOSPADM

## 2019-08-26 RX ADMIN — PROMETHAZINE HYDROCHLORIDE 12.5 MG: 12.5 TABLET ORAL at 16:02

## 2019-08-26 RX ADMIN — TRANEXAMIC ACID 1000 MG: 100 INJECTION, SOLUTION INTRAVENOUS at 08:58

## 2019-08-26 RX ADMIN — ROPIVACAINE HYDROCHLORIDE 10 ML/HR: 5 INJECTION, SOLUTION EPIDURAL; INFILTRATION; PERINEURAL at 09:22

## 2019-08-26 RX ADMIN — CYCLOBENZAPRINE HYDROCHLORIDE 10 MG: 10 TABLET, FILM COATED ORAL at 20:22

## 2019-08-26 RX ADMIN — OXYBUTYNIN CHLORIDE 5 MG: 5 TABLET, EXTENDED RELEASE ORAL at 11:42

## 2019-08-26 RX ADMIN — INSULIN DETEMIR 35 UNITS: 100 INJECTION, SOLUTION SUBCUTANEOUS at 20:23

## 2019-08-26 RX ADMIN — HYDROCODONE BITARTRATE AND ACETAMINOPHEN 1 TABLET: 5; 325 TABLET ORAL at 20:21

## 2019-08-26 RX ADMIN — LIDOCAINE HYDROCHLORIDE 0.2 ML: 10 INJECTION, SOLUTION EPIDURAL; INFILTRATION; INTRACAUDAL; PERINEURAL at 06:42

## 2019-08-26 RX ADMIN — ROSUVASTATIN CALCIUM 5 MG: 10 TABLET, COATED ORAL at 11:42

## 2019-08-26 RX ADMIN — BUPIVACAINE HYDROCHLORIDE 20 ML: 2.5 INJECTION, SOLUTION EPIDURAL; INFILTRATION; INTRACAUDAL; PERINEURAL at 09:41

## 2019-08-26 RX ADMIN — DULOXETINE HYDROCHLORIDE 60 MG: 60 CAPSULE, DELAYED RELEASE ORAL at 11:42

## 2019-08-26 RX ADMIN — SODIUM CHLORIDE, PRESERVATIVE FREE 3 ML: 5 INJECTION INTRAVENOUS at 20:22

## 2019-08-26 RX ADMIN — INSULIN LISPRO 2 UNITS: 100 INJECTION, SOLUTION INTRAVENOUS; SUBCUTANEOUS at 17:38

## 2019-08-26 RX ADMIN — ONDANSETRON 4 MG: 2 INJECTION INTRAMUSCULAR; INTRAVENOUS at 09:25

## 2019-08-26 RX ADMIN — DOCUSATE SODIUM 100 MG: 100 CAPSULE, LIQUID FILLED ORAL at 11:42

## 2019-08-26 RX ADMIN — PREGABALIN 100 MG: 50 CAPSULE ORAL at 20:22

## 2019-08-26 RX ADMIN — PROPOFOL 20 MG: 10 INJECTION, EMULSION INTRAVENOUS at 07:42

## 2019-08-26 RX ADMIN — FAMOTIDINE 20 MG: 20 TABLET ORAL at 11:45

## 2019-08-26 RX ADMIN — VANCOMYCIN HYDROCHLORIDE 2000 MG: 10 INJECTION, POWDER, LYOPHILIZED, FOR SOLUTION INTRAVENOUS at 18:04

## 2019-08-26 RX ADMIN — HYDROMORPHONE HYDROCHLORIDE 0.5 MG: 1 INJECTION, SOLUTION INTRAMUSCULAR; INTRAVENOUS; SUBCUTANEOUS at 12:54

## 2019-08-26 RX ADMIN — DOCUSATE SODIUM 100 MG: 100 CAPSULE, LIQUID FILLED ORAL at 20:22

## 2019-08-26 RX ADMIN — POLYETHYLENE GLYCOL 3350 17 G: 17 POWDER, FOR SOLUTION ORAL at 12:00

## 2019-08-26 RX ADMIN — HYDROMORPHONE HYDROCHLORIDE 0.5 MG: 1 INJECTION, SOLUTION INTRAMUSCULAR; INTRAVENOUS; SUBCUTANEOUS at 16:01

## 2019-08-26 RX ADMIN — SODIUM CHLORIDE 9 ML/HR: 9 INJECTION, SOLUTION INTRAVENOUS at 06:43

## 2019-08-26 RX ADMIN — INSULIN LISPRO 4 UNITS: 100 INJECTION, SOLUTION INTRAVENOUS; SUBCUTANEOUS at 20:23

## 2019-08-26 RX ADMIN — VANCOMYCIN HYDROCHLORIDE 2000 MG: 10 INJECTION, POWDER, LYOPHILIZED, FOR SOLUTION INTRAVENOUS at 06:42

## 2019-08-26 RX ADMIN — FENTANYL CITRATE 100 MCG: 50 INJECTION, SOLUTION INTRAMUSCULAR; INTRAVENOUS at 07:42

## 2019-08-26 RX ADMIN — SODIUM CHLORIDE: 9 INJECTION, SOLUTION INTRAVENOUS at 07:38

## 2019-08-26 RX ADMIN — FAMOTIDINE 20 MG: 20 TABLET ORAL at 06:41

## 2019-08-26 RX ADMIN — DEXAMETHASONE SODIUM PHOSPHATE 8 MG: 4 INJECTION, SOLUTION INTRAMUSCULAR; INTRAVENOUS at 08:00

## 2019-08-26 RX ADMIN — PREGABALIN 75 MG: 75 CAPSULE ORAL at 06:41

## 2019-08-26 RX ADMIN — HYDROCODONE BITARTRATE AND ACETAMINOPHEN 1 TABLET: 5; 325 TABLET ORAL at 13:31

## 2019-08-26 RX ADMIN — TRANEXAMIC ACID 1000 MG: 100 INJECTION, SOLUTION INTRAVENOUS at 07:54

## 2019-08-26 RX ADMIN — BUPIVACAINE HYDROCHLORIDE 2 ML: 5 INJECTION, SOLUTION PERINEURAL at 07:48

## 2019-08-26 RX ADMIN — MUPIROCIN 1 APPLICATION: 20 OINTMENT TOPICAL at 06:41

## 2019-08-26 RX ADMIN — PROPOFOL 75 MCG/KG/MIN: 10 INJECTION, EMULSION INTRAVENOUS at 07:54

## 2019-08-26 NOTE — ANESTHESIA PROCEDURE NOTES
Spinal Block      Patient reassessed immediately prior to procedure    Patient location during procedure: OR  Indication:at surgeon's request  Performed By  CRNA: Luisito Mills CRNA  Preanesthetic Checklist  Completed: patient identified, site marked, surgical consent, pre-op evaluation, timeout performed, IV checked, risks and benefits discussed and monitors and equipment checked  Spinal Block Prep:  Patient Position:sitting  Sterile Tech:cap, gloves, sterile barriers and mask  Prep:Chloraprep  Patient Monitoring:blood pressure monitoring, continuous pulse oximetry and EKG  Spinal Block Procedure  Approach:midline  Guidance:landmark technique and palpation technique  Location:L4-L5  Needle Type:Sprotte  Needle Gauge:25 G  Placement of Spinal needle event:cerebrospinal fluid aspirated  Paresthesia: no  Fluid Appearance:clear  Medications: bupivacaine (MARCAINE) 0.5 % injection, 2 mL  Med Administered at 8/26/2019 7:48 AM   Post Assessment  Patient Tolerance:patient tolerated the procedure well with no apparent complications  Complications no  Additional Notes  Procedure:  Pt assisted to sitting position, with legs in position of comfort over side of bed.  Pt. instructed in optimal spine presentation, the spine was prepped/ Draped and the skin at insertion site was anesthetized with 1% Lidocaine 2 ml.  The spinal needle was then advanced until CSF flow was obtained and LA was injected:

## 2019-08-26 NOTE — ANESTHESIA POSTPROCEDURE EVALUATION
Patient: Lissett Lowry    Procedure Summary     Date:  08/26/19 Room / Location:   [facility]    Anesthesia Start:  0738 Anesthesia Stop:      Procedure:  TOTAL KNEE ARTHROPLASTY LEFT (Left Knee) Diagnosis:      Surgeon:  Rony Rojas MD Provider:  Dino Beard MD    Anesthesia Type:  spinal ASA Status:  3          Anesthesia Type: spinal  Last vitals  /61  171/67 (08/26/19 0632)   Temp 98  97.7 °F (36.5 °C) (08/26/19 0632)   Pulse 64  61 (08/26/19 0632)   Resp 18  18 (08/26/19 0632)     SpO2 98  97 % (08/26/19 0632)     Post Anesthesia Care and Evaluation    Patient location during evaluation: PACU  Patient participation: complete - patient participated  Level of consciousness: awake and alert  Pain score: 0  Pain management: adequate  Airway patency: patent  Anesthetic complications: No anesthetic complications  PONV Status: none  Cardiovascular status: hemodynamically stable and acceptable  Respiratory status: nonlabored ventilation, acceptable and nasal cannula  Hydration status: acceptable

## 2019-08-26 NOTE — ANESTHESIA PROCEDURE NOTES
Peripheral Block      Patient reassessed immediately prior to procedure    Patient location during procedure: post-op  Reason for block: at surgeon's request and post-op pain management  Performed by  CRNA: Luisito Mills, CRNA  Preanesthetic Checklist  Completed: patient identified, site marked, surgical consent, pre-op evaluation, timeout performed, IV checked, risks and benefits discussed and monitors and equipment checked  Prep:  Pt Position: supine  Sterile barriers:cap, gloves, mask and sterile barriers  Prep: ChloraPrep  Patient monitoring: blood pressure monitoring, continuous pulse oximetry and EKG  Procedure  Performed under: spinal  Guidance:ultrasound guided  Images:still images obtained, printed/placed on chart    Laterality:left  Block Type:adductor canal block  Injection Technique:catheter  Needle Type:Tuohy and echogenic  Needle Gauge:18 G  Resistance on Injection: none  Catheter Size:20 G (20g)  Cath Depth at skin: 11 cm    Medications Used: bupivacaine PF (MARCAINE) 0.25 % injection, 20 mL  Med admintered at 8/26/2019 9:41 AM      Post Assessment  Injection Assessment: negative aspiration for heme, incremental injection and no paresthesia on injection  Patient Tolerance:comfortable throughout block  Complications:no  Additional Notes  Procedure:             The pt was placed in the Supine position.  The Insertion site was  prepped and Draped in sterile fashion.  The pt was anesthetized with  IV Sedation( see meds).  Skin and cutaneous tissue was infiltrated and anesthetized with 1% Lidocaine 3 mls via a 25g needle.  A BBraun 4 inch 18g echogenic needle was then  inserted approximately midline, mid-thigh and advanced In-plane with Ultrasound guidance.  Normal Saline PSF was utilized for hydrodissection of tissue.  The Vastus medialis and Sartorius muscle where visualized and the needle tip was placed in the adductor canal,  lateral to the femoral artery.  LA injection spread was visualized, injection  was incremental 1-5ml, injection pressure was normal or little, no intraneural injection, no vascular injection.  LA dose was injected thru the needle(see dose above).  A BBraun 20g wire stylet catheter was placed via the needle with ultrasound visualization and confirmation with NS fluid bolus. The labeled Catheter was then secured to skin at insertion site with skin afix and steristrips to curled catheter and CHG transparent dressing.  Thank you.

## 2019-08-26 NOTE — ANESTHESIA PREPROCEDURE EVALUATION
Anesthesia Evaluation     Patient summary reviewed and Nursing notes reviewed   NPO Solid Status: > 8 hours  NPO Liquid Status: > 8 hours           Airway   Mallampati: II  TM distance: >3 FB  Neck ROM: full  No difficulty expected  Dental      Pulmonary - normal exam   Cardiovascular   Exercise tolerance: good (4-7 METS)    Rhythm: irregular  Rate: normal        Neuro/Psych  GI/Hepatic/Renal/Endo      Musculoskeletal     Abdominal    Substance History      OB/GYN          Other                        Anesthesia Plan    ASA 3     spinal     intravenous induction     adductor canal block in pacu

## 2019-08-27 PROBLEM — D62 ACUTE BLOOD LOSS ANEMIA: Status: ACTIVE | Noted: 2019-08-27

## 2019-08-27 LAB
ANION GAP SERPL CALCULATED.3IONS-SCNC: 12 MMOL/L (ref 5–15)
BASOPHILS # BLD AUTO: 0.02 10*3/MM3 (ref 0–0.2)
BASOPHILS NFR BLD AUTO: 0.2 % (ref 0–1.5)
BUN BLD-MCNC: 27 MG/DL (ref 8–23)
BUN/CREAT SERPL: 23.9 (ref 7–25)
CALCIUM SPEC-SCNC: 10.1 MG/DL (ref 8.6–10.5)
CHLORIDE SERPL-SCNC: 108 MMOL/L (ref 98–107)
CO2 SERPL-SCNC: 24 MMOL/L (ref 22–29)
CREAT BLD-MCNC: 1.13 MG/DL (ref 0.57–1)
DEPRECATED RDW RBC AUTO: 46.8 FL (ref 37–54)
EOSINOPHIL # BLD AUTO: 0 10*3/MM3 (ref 0–0.4)
EOSINOPHIL NFR BLD AUTO: 0 % (ref 0.3–6.2)
ERYTHROCYTE [DISTWIDTH] IN BLOOD BY AUTOMATED COUNT: 13.5 % (ref 12.3–15.4)
GFR SERPL CREATININE-BSD FRML MDRD: 48 ML/MIN/1.73
GLUCOSE BLD-MCNC: 131 MG/DL (ref 65–99)
GLUCOSE BLDC GLUCOMTR-MCNC: 132 MG/DL (ref 70–130)
GLUCOSE BLDC GLUCOMTR-MCNC: 152 MG/DL (ref 70–130)
GLUCOSE BLDC GLUCOMTR-MCNC: 163 MG/DL (ref 70–130)
GLUCOSE BLDC GLUCOMTR-MCNC: 169 MG/DL (ref 70–130)
HBA1C MFR BLD: 5.5 % (ref 4.8–5.6)
HCT VFR BLD AUTO: 33.4 % (ref 34–46.6)
HGB BLD-MCNC: 10.6 G/DL (ref 12–15.9)
IMM GRANULOCYTES # BLD AUTO: 0.06 10*3/MM3 (ref 0–0.05)
IMM GRANULOCYTES NFR BLD AUTO: 0.7 % (ref 0–0.5)
LYMPHOCYTES # BLD AUTO: 0.7 10*3/MM3 (ref 0.7–3.1)
LYMPHOCYTES NFR BLD AUTO: 8.2 % (ref 19.6–45.3)
MCH RBC QN AUTO: 29.9 PG (ref 26.6–33)
MCHC RBC AUTO-ENTMCNC: 31.7 G/DL (ref 31.5–35.7)
MCV RBC AUTO: 94.4 FL (ref 79–97)
MONOCYTES # BLD AUTO: 1.02 10*3/MM3 (ref 0.1–0.9)
MONOCYTES NFR BLD AUTO: 11.9 % (ref 5–12)
NEUTROPHILS # BLD AUTO: 6.77 10*3/MM3 (ref 1.7–7)
NEUTROPHILS NFR BLD AUTO: 79 % (ref 42.7–76)
NRBC BLD AUTO-RTO: 0 /100 WBC (ref 0–0.2)
PLAT MORPH BLD: NORMAL
PLATELET # BLD AUTO: 196 10*3/MM3 (ref 140–450)
PMV BLD AUTO: 11.5 FL (ref 6–12)
POTASSIUM BLD-SCNC: 4.1 MMOL/L (ref 3.5–5.2)
RBC # BLD AUTO: 3.54 10*6/MM3 (ref 3.77–5.28)
RBC MORPH BLD: NORMAL
SODIUM BLD-SCNC: 144 MMOL/L (ref 136–145)
WBC MORPH BLD: NORMAL
WBC NRBC COR # BLD: 8.57 10*3/MM3 (ref 3.4–10.8)

## 2019-08-27 PROCEDURE — 97530 THERAPEUTIC ACTIVITIES: CPT

## 2019-08-27 PROCEDURE — 97116 GAIT TRAINING THERAPY: CPT

## 2019-08-27 PROCEDURE — 94799 UNLISTED PULMONARY SVC/PX: CPT

## 2019-08-27 PROCEDURE — 97535 SELF CARE MNGMENT TRAINING: CPT | Performed by: OCCUPATIONAL THERAPIST

## 2019-08-27 PROCEDURE — 63710000001 INSULIN DETEMIR PER 5 UNITS: Performed by: NURSE PRACTITIONER

## 2019-08-27 PROCEDURE — 82962 GLUCOSE BLOOD TEST: CPT

## 2019-08-27 PROCEDURE — 80048 BASIC METABOLIC PNL TOTAL CA: CPT | Performed by: NURSE PRACTITIONER

## 2019-08-27 PROCEDURE — 83036 HEMOGLOBIN GLYCOSYLATED A1C: CPT | Performed by: NURSE PRACTITIONER

## 2019-08-27 PROCEDURE — 85007 BL SMEAR W/DIFF WBC COUNT: CPT | Performed by: ORTHOPAEDIC SURGERY

## 2019-08-27 PROCEDURE — 94660 CPAP INITIATION&MGMT: CPT

## 2019-08-27 PROCEDURE — 85025 COMPLETE CBC W/AUTO DIFF WBC: CPT | Performed by: ORTHOPAEDIC SURGERY

## 2019-08-27 PROCEDURE — 97166 OT EVAL MOD COMPLEX 45 MIN: CPT | Performed by: OCCUPATIONAL THERAPIST

## 2019-08-27 PROCEDURE — 97110 THERAPEUTIC EXERCISES: CPT

## 2019-08-27 RX ORDER — HYDROCODONE BITARTRATE AND ACETAMINOPHEN 5; 325 MG/1; MG/1
1 TABLET ORAL EVERY 4 HOURS PRN
Qty: 40 TABLET | Refills: 0 | Status: SHIPPED | OUTPATIENT
Start: 2019-08-27 | End: 2019-08-28

## 2019-08-27 RX ADMIN — INSULIN DETEMIR 35 UNITS: 100 INJECTION, SOLUTION SUBCUTANEOUS at 20:17

## 2019-08-27 RX ADMIN — HYDROCODONE BITARTRATE AND ACETAMINOPHEN 1 TABLET: 5; 325 TABLET ORAL at 08:39

## 2019-08-27 RX ADMIN — PREGABALIN 100 MG: 50 CAPSULE ORAL at 08:39

## 2019-08-27 RX ADMIN — FAMOTIDINE 20 MG: 20 TABLET ORAL at 08:39

## 2019-08-27 RX ADMIN — INSULIN LISPRO 2 UNITS: 100 INJECTION, SOLUTION INTRAVENOUS; SUBCUTANEOUS at 20:20

## 2019-08-27 RX ADMIN — OXYBUTYNIN CHLORIDE 5 MG: 5 TABLET, EXTENDED RELEASE ORAL at 08:40

## 2019-08-27 RX ADMIN — INSULIN LISPRO 2 UNITS: 100 INJECTION, SOLUTION INTRAVENOUS; SUBCUTANEOUS at 13:00

## 2019-08-27 RX ADMIN — DOCUSATE SODIUM 100 MG: 100 CAPSULE, LIQUID FILLED ORAL at 20:17

## 2019-08-27 RX ADMIN — DULOXETINE HYDROCHLORIDE 60 MG: 60 CAPSULE, DELAYED RELEASE ORAL at 08:39

## 2019-08-27 RX ADMIN — HYDROCODONE BITARTRATE AND ACETAMINOPHEN 1 TABLET: 5; 325 TABLET ORAL at 17:24

## 2019-08-27 RX ADMIN — CYCLOBENZAPRINE HYDROCHLORIDE 10 MG: 10 TABLET, FILM COATED ORAL at 17:24

## 2019-08-27 RX ADMIN — CYCLOBENZAPRINE HYDROCHLORIDE 10 MG: 10 TABLET, FILM COATED ORAL at 04:07

## 2019-08-27 RX ADMIN — POLYETHYLENE GLYCOL 3350 17 G: 17 POWDER, FOR SOLUTION ORAL at 08:39

## 2019-08-27 RX ADMIN — BISACODYL 10 MG: 5 TABLET, COATED ORAL at 04:07

## 2019-08-27 RX ADMIN — PREGABALIN 100 MG: 50 CAPSULE ORAL at 20:17

## 2019-08-27 RX ADMIN — DOCUSATE SODIUM 100 MG: 100 CAPSULE, LIQUID FILLED ORAL at 08:39

## 2019-08-27 RX ADMIN — AMLODIPINE BESYLATE 5 MG: 5 TABLET ORAL at 08:39

## 2019-08-27 RX ADMIN — FEBUXOSTAT 40 MG: 40 TABLET ORAL at 11:07

## 2019-08-27 RX ADMIN — ASPIRIN 325 MG ORAL TABLET 325 MG: 325 PILL ORAL at 08:39

## 2019-08-27 RX ADMIN — METOPROLOL SUCCINATE 75 MG: 50 TABLET, EXTENDED RELEASE ORAL at 08:40

## 2019-08-27 RX ADMIN — ROSUVASTATIN CALCIUM 5 MG: 10 TABLET, COATED ORAL at 08:40

## 2019-08-27 RX ADMIN — HYDROCODONE BITARTRATE AND ACETAMINOPHEN 1 TABLET: 5; 325 TABLET ORAL at 04:07

## 2019-08-28 VITALS
WEIGHT: 284.39 LBS | TEMPERATURE: 97.9 F | HEART RATE: 89 BPM | SYSTOLIC BLOOD PRESSURE: 142 MMHG | RESPIRATION RATE: 16 BRPM | OXYGEN SATURATION: 92 % | BODY MASS INDEX: 45.71 KG/M2 | HEIGHT: 66 IN | DIASTOLIC BLOOD PRESSURE: 60 MMHG

## 2019-08-28 LAB — GLUCOSE BLDC GLUCOMTR-MCNC: 112 MG/DL (ref 70–130)

## 2019-08-28 PROCEDURE — 82962 GLUCOSE BLOOD TEST: CPT

## 2019-08-28 PROCEDURE — 97535 SELF CARE MNGMENT TRAINING: CPT | Performed by: OCCUPATIONAL THERAPIST

## 2019-08-28 PROCEDURE — 94799 UNLISTED PULMONARY SVC/PX: CPT

## 2019-08-28 PROCEDURE — 94660 CPAP INITIATION&MGMT: CPT

## 2019-08-28 PROCEDURE — 97116 GAIT TRAINING THERAPY: CPT

## 2019-08-28 RX ORDER — HYDROCODONE BITARTRATE AND ACETAMINOPHEN 5; 325 MG/1; MG/1
1 TABLET ORAL EVERY 4 HOURS PRN
Qty: 40 TABLET | Refills: 0 | Status: SHIPPED | OUTPATIENT
Start: 2019-08-28 | End: 2019-09-06

## 2019-08-28 RX ORDER — PSEUDOEPHEDRINE HCL 30 MG
1 TABLET ORAL 2 TIMES DAILY
Qty: 60 EACH | Refills: 0 | Status: SHIPPED | OUTPATIENT
Start: 2019-08-28

## 2019-08-28 RX ORDER — ASPIRIN 325 MG
325 TABLET ORAL DAILY
Qty: 30 TABLET | Refills: 0 | Status: SHIPPED | OUTPATIENT
Start: 2019-08-29

## 2019-08-28 RX ADMIN — HYDROCODONE BITARTRATE AND ACETAMINOPHEN 1 TABLET: 5; 325 TABLET ORAL at 08:36

## 2019-08-28 RX ADMIN — POLYETHYLENE GLYCOL 3350 17 G: 17 POWDER, FOR SOLUTION ORAL at 08:34

## 2019-08-28 RX ADMIN — AMLODIPINE BESYLATE 5 MG: 5 TABLET ORAL at 08:37

## 2019-08-28 RX ADMIN — CYCLOBENZAPRINE HYDROCHLORIDE 10 MG: 10 TABLET, FILM COATED ORAL at 08:36

## 2019-08-28 RX ADMIN — FEBUXOSTAT 40 MG: 40 TABLET ORAL at 11:31

## 2019-08-28 RX ADMIN — ASPIRIN 325 MG ORAL TABLET 325 MG: 325 PILL ORAL at 08:35

## 2019-08-28 RX ADMIN — SODIUM CHLORIDE, PRESERVATIVE FREE 3 ML: 5 INJECTION INTRAVENOUS at 08:38

## 2019-08-28 RX ADMIN — ROSUVASTATIN CALCIUM 5 MG: 10 TABLET, COATED ORAL at 08:36

## 2019-08-28 RX ADMIN — DULOXETINE HYDROCHLORIDE 60 MG: 60 CAPSULE, DELAYED RELEASE ORAL at 08:37

## 2019-08-28 RX ADMIN — METOPROLOL SUCCINATE 75 MG: 50 TABLET, EXTENDED RELEASE ORAL at 08:35

## 2019-08-28 RX ADMIN — OXYBUTYNIN CHLORIDE 5 MG: 5 TABLET, EXTENDED RELEASE ORAL at 08:37

## 2019-08-28 RX ADMIN — DOCUSATE SODIUM 100 MG: 100 CAPSULE, LIQUID FILLED ORAL at 08:37

## 2019-08-28 RX ADMIN — BISACODYL 10 MG: 5 TABLET, COATED ORAL at 08:35

## 2019-08-28 RX ADMIN — PREGABALIN 100 MG: 50 CAPSULE ORAL at 08:37

## 2019-08-28 RX ADMIN — FAMOTIDINE 20 MG: 20 TABLET ORAL at 08:37

## 2019-08-29 ENCOUNTER — READMISSION MANAGEMENT (OUTPATIENT)
Dept: CALL CENTER | Facility: HOSPITAL | Age: 69
End: 2019-08-29

## 2019-08-30 ENCOUNTER — READMISSION MANAGEMENT (OUTPATIENT)
Dept: CALL CENTER | Facility: HOSPITAL | Age: 69
End: 2019-08-30

## 2019-08-30 LAB
ABO + RH BLD: NORMAL
ABO + RH BLD: NORMAL
BH BB BLOOD EXPIRATION DATE: NORMAL
BH BB BLOOD EXPIRATION DATE: NORMAL
BH BB BLOOD TYPE BARCODE: 6200
BH BB BLOOD TYPE BARCODE: 6200
BH BB DISPENSE STATUS: NORMAL
BH BB DISPENSE STATUS: NORMAL
BH BB PRODUCT CODE: NORMAL
BH BB PRODUCT CODE: NORMAL
BH BB UNIT NUMBER: NORMAL
BH BB UNIT NUMBER: NORMAL
UNIT  ABO: NORMAL
UNIT  ABO: NORMAL
UNIT  RH: NORMAL
UNIT  RH: NORMAL

## 2019-08-30 NOTE — OUTREACH NOTE
Total Joint Week 1 Survey      Responses   Facility patient discharged from?  [place]   Does the patient have one of the following disease processes/diagnoses(primary or secondary)?  Total Joint Replacement   Is there a successful TCM telephone encounter documented?  No   Joint surgery performed?  Knee   Week 1 attempt successful?  Yes   Call start time  0856   Call end time  0907   Discharge diagnosis  TOTAL KNEE ARTHROPLASTY   Does the patient have all medications related to this admission filled (includes all antibiotics, pain medications, etc.)  Yes   Is the patient taking all medications as directed (includes completed medication regime)?  No   What is preventing the patient from taking all medications as directed?  Desires to consult PCP first [Has hx of c diff and not taking stool softener, not having any issues having bm]   Is the patient able to teach back alternate methods of pain control?  Ice, Correct alignment, Knee-elevation/no pillow under knee, Reposition, Short, frequent activity   Does the patient have a follow up appointment with their surgeon?  Yes   Has the patient kept scheduled appointments due by today?  N/A   What is the Home health agency?    [facility]   Has home health visited the patient within 72 hours of discharge?  Yes   What DME was ordered?  [facility]   Has all DME been delivered?  Yes   Psychosocial issues?  No   When is the first therapy visit scheduled (PO Day) including how many days per week   8/29, not sure about frequency   Has the patient began therapy sessions (either in the home or as an out patient)?  Yes   If the patient has started attending therapy, what post op day did they begin to attend (either in home or as an out patient)?    PO#3   Does the patient have a wound vac in place?  No   Has the patient fallen since discharge?  No   Did the patient receive a copy of their discharge instructions?  Yes   Nursing interventions  Reviewed instructions with patient    What is the patient's perception of their functional status since discharge?  Improving   Is the patient able to teach back signs and symptoms of infection?  Temp >100.4 for 24h or longer, Incisional drainage, Blisters around incision, Increased swelling or redness around incision (not associated with surgical edema), Severe discomfort or pain, Changes in mobility, Shortness of breath or chest pain   Is the patient able to teach back how to prevent infection?  Check incision daily, Keep incision covered if drainage, Shower only as directed by surgeon, Eat well-balanced diet, No lotion or creams, Wash hands before and after touching incision, Keep incision covered if pets in house, No tub baths, hot tub or swimming   Is the patient able to teach back signs and symptoms of DVT?  Redness in calf, Severe pain in calf, Swelling in calf, Area hot to touch   Is the patient able to teach back home safety measures?  Ability to shower, Accessibility to necessary areas in home   Did the patient implement home safety suggestions from pre-surgery classes if attended?  N/A   Is the patient/caregiver able to teach back the hierarchy of who to call/visit for symptoms/problems? PCP, Specialist, Home health nurse, Urgent Care, ED, 911  Yes   Week 1 call completed?  Yes          Sylvia Ring RN

## 2019-09-06 ENCOUNTER — READMISSION MANAGEMENT (OUTPATIENT)
Dept: CALL CENTER | Facility: HOSPITAL | Age: 69
End: 2019-09-06

## 2019-09-06 NOTE — OUTREACH NOTE
Total Joint Week 2 Survey      Responses   Facility patient discharged from?  [place]   Does the patient have one of the following disease processes/diagnoses(primary or secondary)?  Total Joint Replacement   Joint surgery performed?  Knee   Week 2 attempt successful?  Yes   Call start time  1852   Call end time  1855   Has the patient been back in either the hospital or Emergency Department since discharge?  No   Discharge diagnosis  TOTAL KNEE ARTHROPLASTY   Does the patient have all medications related to this admission filled (includes all antibiotics, pain medications, etc.)  Yes   Is the patient taking all medications as directed (includes completed medication regime)?  Yes   Is the patient able to teach back alternate methods of pain control?  Ice, Knee-elevation/no pillow under knee, Correct alignment, Short, frequent activity   Does the patient have a follow up appointment with their surgeon?  Yes   Has the patient kept scheduled appointments due by today?  Yes   Comments  Pt will see surgeon on 09/19   What is the Home health agency?    [facility]   Psychosocial issues?  No   Has the patient began therapy sessions (either in the home or as an out patient)?  Yes   If the patient has started attending therapy, what post op day did they begin to attend (either in home or as an out patient)?    Pt is doing 2-3 therapy sessions in home per week.   Does the patient have a wound vac in place?  No   Has the patient fallen since discharge?  No   Did the patient receive a copy of their discharge instructions?  Yes   Nursing interventions  Reviewed instructions with patient   What is the patient's perception of their functional status since discharge?  Improving   Is the patient able to teach back signs and symptoms of infection?  Temp >100.4 for 24h or longer, Incisional drainage, Blisters around incision, Increased swelling or redness around incision (not associated with surgical edema), Severe discomfort or pain,  "Changes in mobility, Shortness of breath or chest pain   Is the patient able to teach back signs and symptoms of DVT?  Redness in calf, Swelling in calf, Severe pain in calf   Is the patient able to teach back home safety measures?  Ability to shower   Did the patient implement home safety suggestions from pre-surgery classes if attended?  N/A   Is the patient/caregiver able to teach back the hierarchy of who to call/visit for symptoms/problems? PCP, Specialist, Home health nurse, Urgent Care, ED, 911  Yes   Week 2 call completed?  Yes   Wrap up additional comments  Pt reports she is doing 'as well as can be expected\"          Penelope Myers RN  "

## 2019-09-20 ENCOUNTER — READMISSION MANAGEMENT (OUTPATIENT)
Dept: CALL CENTER | Facility: HOSPITAL | Age: 69
End: 2019-09-20

## 2019-09-20 NOTE — OUTREACH NOTE
Total Joint Month 1 Survey      Responses   Facility patient discharged from?  [place]   Does the patient have one of the following disease processes/diagnoses(primary or secondary)?  Total Joint Replacement   Joint surgery performed?  Knee   Month 1 attempt successful?  Yes   Call start time  1652   Call end time  1653   Has the patient been back in either the hospital or Emergency Department since discharge?  No   Discharge diagnosis  TOTAL KNEE ARTHROPLASTY   Is the patient taking all medications as directed (includes completed medication regime)?  Yes   Is the patient able to teach back alternate methods of pain control?  Ice, Knee-elevation/no pillow under knee, Reposition, Correct alignment, Short, frequent activity   Has the patient kept scheduled appointments due by today?  Yes   Is the patient still receiving Home Health Services?  N/A   Is the patient still attending therapy sessions(either in the home or as an outpatient)?  No   Has the patient fallen since discharge?  No   What is the patient's perception of their functional status since discharge?  Improving   Is the patient able to teach back signs and symptoms of infection?  Temp >100.4 for 24h or longer, Incisional drainage, Blisters around incision, Increased swelling or redness around incision (not associated with surgical edema), Severe discomfort or pain, Changes in mobility, Shortness of breath or chest pain   Is the patient/caregiver able to teach back the hierarchy of who to call/visit for symptoms/problems? PCP, Specialist, Home health nurse, Urgent Care, ED, 911  Yes   Month 1 call completed?  Yes          Iris Avina RN

## 2019-10-21 ENCOUNTER — READMISSION MANAGEMENT (OUTPATIENT)
Dept: CALL CENTER | Facility: HOSPITAL | Age: 69
End: 2019-10-21

## 2019-10-21 NOTE — OUTREACH NOTE
Total Joint Month 2 Survey      Responses   Facility patient discharged from?  [place]   Does the patient have one of the following disease processes/diagnoses(primary or secondary)?  Total Joint Replacement   Joint surgery performed?  Knee   Month 2 attempt successful?  Yes   Call start time  1406   Call end time  1410   Has the patient been back in either the hospital or Emergency Department since discharge?  No   Discharge diagnosis  TOTAL KNEE ARTHROPLASTY   Is patient permission given to speak with other caregiver?  No   Is the patient taking all medications as directed (includes completed medication regime)?  Yes   Has the patient kept scheduled appointments due by today?  Yes   Is the patient still receiving Home Health Services?  N/A   Is the patient still attending therapy sessions(either in the home or as an outpatient)?  No   Has the patient fallen since discharge?  No   What is the patient's perception of their functional status since discharge?  Improving   Is the patient able to teach back signs and symptoms of infection?  -- [Patient states that she has healed well,  no signs of infection. ]   Is the patient/caregiver able to teach back the hierarchy of who to call/visit for symptoms/problems? PCP, Specialist, Home health nurse, Urgent Care, ED, 911  Yes   Month 2 Call Completed?  Yes          Sylvia Lassiter RN

## 2019-11-21 ENCOUNTER — READMISSION MANAGEMENT (OUTPATIENT)
Dept: CALL CENTER | Facility: HOSPITAL | Age: 69
End: 2019-11-21

## 2019-11-21 NOTE — OUTREACH NOTE
Total Joint Month 3 Survey      Responses   Facility patient discharged from?  [place]   Does the patient have one of the following disease processes/diagnoses(primary or secondary)?  Total Joint Replacement   Joint surgery performed?  Knee   Month 3 attempt successful?  Yes   Call start time  1559   Call end time  1601   Has the patient been back in either the hospital or Emergency Department since discharge?  No   Discharge diagnosis  TOTAL KNEE ARTHROPLASTY   Is the patient taking all medications as directed (includes completed medication regime)?  Yes   Is the patient able to teach back alternate methods of pain control?  Reposition, Correct alignment   Has the patient kept scheduled appointments due by today?  Yes   Is the patient still receiving Home Health Services?  N/A   Is the patient still attending therapy sessions(either in the home or as an outpatient)?  No   Has the patient fallen since discharge?  No   What is the patient's perception of their functional status since discharge?  Returned to baseline/stable   Is the patient able to teach back signs and symptoms of infection?  Changes in mobility   Graduated  Yes   Did the patient feel the follow up calls were helpful during their recovery period?  Yes   Was the number of calls appropriate?  Yes          Lissett Burnette RN

## 2023-05-01 ENCOUNTER — TRANSCRIBE ORDERS (OUTPATIENT)
Dept: ADMINISTRATIVE | Facility: HOSPITAL | Age: 73
End: 2023-05-01
Payer: MEDICARE

## 2023-05-01 DIAGNOSIS — K42.9 UMBILICAL HERNIA WITHOUT OBSTRUCTION OR GANGRENE: Primary | ICD-10-CM

## 2023-05-30 ENCOUNTER — HOSPITAL ENCOUNTER (OUTPATIENT)
Dept: CT IMAGING | Facility: HOSPITAL | Age: 73
Discharge: HOME | End: 2023-05-30
Admitting: SURGERY

## 2023-05-30 DIAGNOSIS — K42.9 UMBILICAL HERNIA WITHOUT OBSTRUCTION OR GANGRENE: ICD-10-CM

## 2023-05-30 PROCEDURE — 74176 CT ABD & PELVIS W/O CONTRAST: CPT

## 2023-06-09 ENCOUNTER — TRANSCRIBE ORDERS (OUTPATIENT)
Dept: ADMINISTRATIVE | Facility: HOSPITAL | Age: 73
End: 2023-06-09
Payer: MEDICARE

## 2023-06-09 DIAGNOSIS — D35.00 BENIGN NEOPLASM OF ADRENAL GLAND, UNSPECIFIED LATERALITY: Primary | ICD-10-CM

## 2023-06-09 DIAGNOSIS — E27.8 ADRENAL MASS: ICD-10-CM

## 2023-06-15 ENCOUNTER — HOSPITAL ENCOUNTER (OUTPATIENT)
Dept: MRI IMAGING | Facility: HOSPITAL | Age: 73
Discharge: HOME | End: 2023-06-15
Admitting: SURGERY
Payer: MEDICARE

## 2023-06-15 DIAGNOSIS — E27.8 ADRENAL MASS: ICD-10-CM

## 2023-06-15 LAB — CREAT BLDA-MCNC: 1 MG/DL (ref 0.6–1.3)

## 2023-06-15 PROCEDURE — A9577 INJ MULTIHANCE: HCPCS | Performed by: SURGERY

## 2023-06-15 PROCEDURE — 0 GADOBENATE DIMEGLUMINE 529 MG/ML SOLUTION: Performed by: SURGERY

## 2023-06-15 PROCEDURE — 82565 ASSAY OF CREATININE: CPT

## 2023-06-15 PROCEDURE — 74183 MRI ABD W/O CNTR FLWD CNTR: CPT

## 2023-06-15 RX ADMIN — GADOBENATE DIMEGLUMINE 18 ML: 529 INJECTION, SOLUTION INTRAVENOUS at 12:10

## 2023-07-20 ENCOUNTER — APPOINTMENT (OUTPATIENT)
Dept: GENERAL RADIOLOGY | Facility: HOSPITAL | Age: 73
DRG: 393 | End: 2023-07-20
Payer: MEDICARE

## 2023-07-20 ENCOUNTER — APPOINTMENT (OUTPATIENT)
Dept: CT IMAGING | Facility: HOSPITAL | Age: 73
DRG: 393 | End: 2023-07-20
Payer: MEDICARE

## 2023-07-20 ENCOUNTER — HOSPITAL ENCOUNTER (INPATIENT)
Facility: HOSPITAL | Age: 73
LOS: 5 days | Discharge: HOME | DRG: 393 | End: 2023-07-25
Attending: EMERGENCY MEDICINE | Admitting: INTERNAL MEDICINE
Payer: MEDICARE

## 2023-07-20 DIAGNOSIS — J18.9 PNEUMONIA OF RIGHT LOWER LOBE DUE TO INFECTIOUS ORGANISM: ICD-10-CM

## 2023-07-20 DIAGNOSIS — M17.12 PRIMARY OSTEOARTHRITIS OF LEFT KNEE: ICD-10-CM

## 2023-07-20 DIAGNOSIS — N17.9 AKI (ACUTE KIDNEY INJURY): ICD-10-CM

## 2023-07-20 DIAGNOSIS — K56.609 SBO (SMALL BOWEL OBSTRUCTION): Primary | ICD-10-CM

## 2023-07-20 DIAGNOSIS — M79.671 RIGHT FOOT PAIN: ICD-10-CM

## 2023-07-20 DIAGNOSIS — E13.69 OTHER SPECIFIED DIABETES MELLITUS WITH OTHER SPECIFIED COMPLICATION, UNSPECIFIED WHETHER LONG TERM INSULIN USE: ICD-10-CM

## 2023-07-20 DIAGNOSIS — M81.0 OSTEOPOROSIS WITHOUT CURRENT PATHOLOGICAL FRACTURE, UNSPECIFIED OSTEOPOROSIS TYPE: ICD-10-CM

## 2023-07-20 DIAGNOSIS — M79.604 RIGHT LEG PAIN: ICD-10-CM

## 2023-07-20 DIAGNOSIS — R11.2 NAUSEA AND VOMITING, UNSPECIFIED VOMITING TYPE: ICD-10-CM

## 2023-07-20 PROBLEM — M35.00 SJOGREN SYNDROME, UNSPECIFIED: Status: ACTIVE | Noted: 2023-07-20

## 2023-07-20 LAB
ALBUMIN SERPL-MCNC: 4.4 G/DL (ref 3.5–5.2)
ALBUMIN/GLOB SERPL: 1.3 G/DL
ALP SERPL-CCNC: 74 U/L (ref 39–117)
ALT SERPL W P-5'-P-CCNC: 17 U/L (ref 1–33)
ANION GAP SERPL CALCULATED.3IONS-SCNC: 16 MMOL/L (ref 5–15)
AST SERPL-CCNC: 23 U/L (ref 1–32)
BACTERIA UR QL AUTO: ABNORMAL /HPF
BASOPHILS # BLD AUTO: 0.01 10*3/MM3 (ref 0–0.2)
BASOPHILS NFR BLD AUTO: 0.1 % (ref 0–1.5)
BILIRUB SERPL-MCNC: 0.6 MG/DL (ref 0–1.2)
BILIRUB UR QL STRIP: NEGATIVE
BUN SERPL-MCNC: 64 MG/DL (ref 8–23)
BUN/CREAT SERPL: 33 (ref 7–25)
CALCIUM SPEC-SCNC: 11.5 MG/DL (ref 8.6–10.5)
CHLORIDE SERPL-SCNC: 99 MMOL/L (ref 98–107)
CLARITY UR: ABNORMAL
CO2 SERPL-SCNC: 27 MMOL/L (ref 22–29)
COLOR UR: ABNORMAL
CREAT SERPL-MCNC: 1.94 MG/DL (ref 0.57–1)
D-LACTATE SERPL-SCNC: 1.4 MMOL/L (ref 0.5–2)
D-LACTATE SERPL-SCNC: 3.1 MMOL/L (ref 0.5–2)
DEPRECATED RDW RBC AUTO: 45.5 FL (ref 37–54)
EGFRCR SERPLBLD CKD-EPI 2021: 27.1 ML/MIN/1.73
EOSINOPHIL # BLD AUTO: 0 10*3/MM3 (ref 0–0.4)
EOSINOPHIL NFR BLD AUTO: 0 % (ref 0.3–6.2)
ERYTHROCYTE [DISTWIDTH] IN BLOOD BY AUTOMATED COUNT: 13.2 % (ref 12.3–15.4)
GEN 5 2HR TROPONIN T REFLEX: 30 NG/L
GLOBULIN UR ELPH-MCNC: 3.3 GM/DL
GLUCOSE BLDC GLUCOMTR-MCNC: 103 MG/DL (ref 70–130)
GLUCOSE BLDC GLUCOMTR-MCNC: 114 MG/DL (ref 70–130)
GLUCOSE SERPL-MCNC: 189 MG/DL (ref 65–99)
GLUCOSE UR STRIP-MCNC: NEGATIVE MG/DL
GRAN CASTS URNS QL MICRO: ABNORMAL /LPF
HCT VFR BLD AUTO: 44.2 % (ref 34–46.6)
HGB BLD-MCNC: 14.8 G/DL (ref 12–15.9)
HGB UR QL STRIP.AUTO: NEGATIVE
HOLD SPECIMEN: NORMAL
HYALINE CASTS UR QL AUTO: ABNORMAL /LPF
IMM GRANULOCYTES # BLD AUTO: 0.11 10*3/MM3 (ref 0–0.05)
IMM GRANULOCYTES NFR BLD AUTO: 0.7 % (ref 0–0.5)
KETONES UR QL STRIP: ABNORMAL
LEUKOCYTE ESTERASE UR QL STRIP.AUTO: ABNORMAL
LIPASE SERPL-CCNC: 15 U/L (ref 13–60)
LYMPHOCYTES # BLD AUTO: 0.83 10*3/MM3 (ref 0.7–3.1)
LYMPHOCYTES NFR BLD AUTO: 5.1 % (ref 19.6–45.3)
MCH RBC QN AUTO: 31.4 PG (ref 26.6–33)
MCHC RBC AUTO-ENTMCNC: 33.5 G/DL (ref 31.5–35.7)
MCV RBC AUTO: 93.6 FL (ref 79–97)
MONOCYTES # BLD AUTO: 0.77 10*3/MM3 (ref 0.1–0.9)
MONOCYTES NFR BLD AUTO: 4.7 % (ref 5–12)
NEUTROPHILS NFR BLD AUTO: 14.58 10*3/MM3 (ref 1.7–7)
NEUTROPHILS NFR BLD AUTO: 89.4 % (ref 42.7–76)
NITRITE UR QL STRIP: NEGATIVE
NRBC BLD AUTO-RTO: 0 /100 WBC (ref 0–0.2)
PH UR STRIP.AUTO: <=5 [PH] (ref 5–8)
PLATELET # BLD AUTO: 294 10*3/MM3 (ref 140–450)
PMV BLD AUTO: 11.7 FL (ref 6–12)
POTASSIUM SERPL-SCNC: 4.1 MMOL/L (ref 3.5–5.2)
PROT SERPL-MCNC: 7.7 G/DL (ref 6–8.5)
PROT UR QL STRIP: ABNORMAL
RBC # BLD AUTO: 4.72 10*6/MM3 (ref 3.77–5.28)
RBC # UR STRIP: ABNORMAL /HPF
REF LAB TEST METHOD: ABNORMAL
SODIUM SERPL-SCNC: 142 MMOL/L (ref 136–145)
SP GR UR STRIP: 1.02 (ref 1–1.03)
SQUAMOUS #/AREA URNS HPF: ABNORMAL /HPF
TROPONIN T DELTA: -5 NG/L
TROPONIN T SERPL HS-MCNC: 35 NG/L
UROBILINOGEN UR QL STRIP: ABNORMAL
WBC # UR STRIP: ABNORMAL /HPF
WBC NRBC COR # BLD: 16.3 10*3/MM3 (ref 3.4–10.8)
WHOLE BLOOD HOLD COAG: NORMAL
WHOLE BLOOD HOLD SPECIMEN: NORMAL
YEAST URNS QL MICRO: ABNORMAL /HPF

## 2023-07-20 PROCEDURE — 74176 CT ABD & PELVIS W/O CONTRAST: CPT

## 2023-07-20 PROCEDURE — 81001 URINALYSIS AUTO W/SCOPE: CPT | Performed by: EMERGENCY MEDICINE

## 2023-07-20 PROCEDURE — 83605 ASSAY OF LACTIC ACID: CPT | Performed by: EMERGENCY MEDICINE

## 2023-07-20 PROCEDURE — 25010000002 CEFTRIAXONE PER 250 MG: Performed by: INTERNAL MEDICINE

## 2023-07-20 PROCEDURE — 83690 ASSAY OF LIPASE: CPT | Performed by: EMERGENCY MEDICINE

## 2023-07-20 PROCEDURE — 84484 ASSAY OF TROPONIN QUANT: CPT | Performed by: EMERGENCY MEDICINE

## 2023-07-20 PROCEDURE — 87147 CULTURE TYPE IMMUNOLOGIC: CPT | Performed by: EMERGENCY MEDICINE

## 2023-07-20 PROCEDURE — 36415 COLL VENOUS BLD VENIPUNCTURE: CPT

## 2023-07-20 PROCEDURE — 71250 CT THORAX DX C-: CPT

## 2023-07-20 PROCEDURE — 99285 EMERGENCY DEPT VISIT HI MDM: CPT

## 2023-07-20 PROCEDURE — 82948 REAGENT STRIP/BLOOD GLUCOSE: CPT

## 2023-07-20 PROCEDURE — 25010000002 HEPARIN (PORCINE) PER 1000 UNITS: Performed by: INTERNAL MEDICINE

## 2023-07-20 PROCEDURE — 85025 COMPLETE CBC W/AUTO DIFF WBC: CPT | Performed by: EMERGENCY MEDICINE

## 2023-07-20 PROCEDURE — 0 AZTREONAM PER 500 MG: Performed by: EMERGENCY MEDICINE

## 2023-07-20 PROCEDURE — 80053 COMPREHEN METABOLIC PANEL: CPT | Performed by: EMERGENCY MEDICINE

## 2023-07-20 PROCEDURE — 0 DIATRIZOATE MEGLUMINE & SODIUM PER 1 ML: Performed by: EMERGENCY MEDICINE

## 2023-07-20 PROCEDURE — 74018 RADEX ABDOMEN 1 VIEW: CPT

## 2023-07-20 PROCEDURE — 25010000002 METRONIDAZOLE 500 MG/100ML SOLUTION: Performed by: INTERNAL MEDICINE

## 2023-07-20 PROCEDURE — 87150 DNA/RNA AMPLIFIED PROBE: CPT | Performed by: EMERGENCY MEDICINE

## 2023-07-20 PROCEDURE — 87186 SC STD MICRODIL/AGAR DIL: CPT | Performed by: EMERGENCY MEDICINE

## 2023-07-20 PROCEDURE — 25010000002 ONDANSETRON PER 1 MG: Performed by: EMERGENCY MEDICINE

## 2023-07-20 PROCEDURE — 87040 BLOOD CULTURE FOR BACTERIA: CPT | Performed by: EMERGENCY MEDICINE

## 2023-07-20 PROCEDURE — 25010000002 METRONIDAZOLE 500 MG/100ML SOLUTION: Performed by: EMERGENCY MEDICINE

## 2023-07-20 PROCEDURE — 99223 1ST HOSP IP/OBS HIGH 75: CPT | Performed by: INTERNAL MEDICINE

## 2023-07-20 RX ORDER — SERTRALINE HYDROCHLORIDE 100 MG/1
100 TABLET, FILM COATED ORAL DAILY
Status: DISCONTINUED | OUTPATIENT
Start: 2023-07-21 | End: 2023-07-25 | Stop reason: HOSPADM

## 2023-07-20 RX ORDER — NALOXONE HCL 0.4 MG/ML
0.4 VIAL (ML) INJECTION
Status: DISCONTINUED | OUTPATIENT
Start: 2023-07-20 | End: 2023-07-25 | Stop reason: HOSPADM

## 2023-07-20 RX ORDER — POLYETHYLENE GLYCOL 3350 17 G/17G
17 POWDER, FOR SOLUTION ORAL DAILY PRN
Status: DISCONTINUED | OUTPATIENT
Start: 2023-07-20 | End: 2023-07-23

## 2023-07-20 RX ORDER — IPRATROPIUM BROMIDE AND ALBUTEROL SULFATE 2.5; .5 MG/3ML; MG/3ML
3 SOLUTION RESPIRATORY (INHALATION) ONCE
Status: DISCONTINUED | OUTPATIENT
Start: 2023-07-20 | End: 2023-07-20

## 2023-07-20 RX ORDER — NEBIVOLOL 10 MG/1
10 TABLET ORAL DAILY
Status: DISCONTINUED | OUTPATIENT
Start: 2023-07-21 | End: 2023-07-25 | Stop reason: HOSPADM

## 2023-07-20 RX ORDER — SODIUM CHLORIDE 0.9 % (FLUSH) 0.9 %
10 SYRINGE (ML) INJECTION AS NEEDED
Status: DISCONTINUED | OUTPATIENT
Start: 2023-07-20 | End: 2023-07-25 | Stop reason: HOSPADM

## 2023-07-20 RX ORDER — SODIUM CHLORIDE 0.9 % (FLUSH) 0.9 %
10 SYRINGE (ML) INJECTION AS NEEDED
Status: DISCONTINUED | OUTPATIENT
Start: 2023-07-20 | End: 2023-07-21 | Stop reason: SDUPTHER

## 2023-07-20 RX ORDER — SODIUM CHLORIDE 9 MG/ML
40 INJECTION, SOLUTION INTRAVENOUS AS NEEDED
Status: DISCONTINUED | OUTPATIENT
Start: 2023-07-20 | End: 2023-07-25 | Stop reason: HOSPADM

## 2023-07-20 RX ORDER — MORPHINE SULFATE 2 MG/ML
1 INJECTION, SOLUTION INTRAMUSCULAR; INTRAVENOUS EVERY 4 HOURS PRN
Status: DISCONTINUED | OUTPATIENT
Start: 2023-07-20 | End: 2023-07-25 | Stop reason: HOSPADM

## 2023-07-20 RX ORDER — METRONIDAZOLE 500 MG/100ML
500 INJECTION, SOLUTION INTRAVENOUS EVERY 8 HOURS
Status: DISCONTINUED | OUTPATIENT
Start: 2023-07-20 | End: 2023-07-21

## 2023-07-20 RX ORDER — HEPARIN SODIUM 5000 [USP'U]/ML
5000 INJECTION, SOLUTION INTRAVENOUS; SUBCUTANEOUS EVERY 12 HOURS SCHEDULED
Status: DISCONTINUED | OUTPATIENT
Start: 2023-07-20 | End: 2023-07-25 | Stop reason: HOSPADM

## 2023-07-20 RX ORDER — METHYLPREDNISOLONE SODIUM SUCCINATE 125 MG/2ML
125 INJECTION, POWDER, LYOPHILIZED, FOR SOLUTION INTRAMUSCULAR; INTRAVENOUS ONCE
Status: DISCONTINUED | OUTPATIENT
Start: 2023-07-20 | End: 2023-07-20

## 2023-07-20 RX ORDER — ACETAMINOPHEN 325 MG/1
650 TABLET ORAL EVERY 4 HOURS PRN
Status: DISCONTINUED | OUTPATIENT
Start: 2023-07-20 | End: 2023-07-25 | Stop reason: HOSPADM

## 2023-07-20 RX ORDER — AMOXICILLIN 250 MG
2 CAPSULE ORAL 2 TIMES DAILY
Status: DISCONTINUED | OUTPATIENT
Start: 2023-07-20 | End: 2023-07-23

## 2023-07-20 RX ORDER — ROSUVASTATIN CALCIUM 10 MG/1
5 TABLET, COATED ORAL NIGHTLY
Status: DISCONTINUED | OUTPATIENT
Start: 2023-07-20 | End: 2023-07-25 | Stop reason: HOSPADM

## 2023-07-20 RX ORDER — SERTRALINE HYDROCHLORIDE 100 MG/1
1 TABLET, FILM COATED ORAL DAILY
COMMUNITY

## 2023-07-20 RX ORDER — METRONIDAZOLE 500 MG/100ML
500 INJECTION, SOLUTION INTRAVENOUS ONCE
Status: COMPLETED | OUTPATIENT
Start: 2023-07-20 | End: 2023-07-20

## 2023-07-20 RX ORDER — SODIUM CHLORIDE, SODIUM LACTATE, POTASSIUM CHLORIDE, CALCIUM CHLORIDE 600; 310; 30; 20 MG/100ML; MG/100ML; MG/100ML; MG/100ML
100 INJECTION, SOLUTION INTRAVENOUS CONTINUOUS
Status: DISCONTINUED | OUTPATIENT
Start: 2023-07-20 | End: 2023-07-22

## 2023-07-20 RX ORDER — NEBIVOLOL 10 MG/1
1 TABLET ORAL DAILY
COMMUNITY

## 2023-07-20 RX ORDER — DEXTROSE MONOHYDRATE 25 G/50ML
25 INJECTION, SOLUTION INTRAVENOUS
Status: DISCONTINUED | OUTPATIENT
Start: 2023-07-20 | End: 2023-07-25 | Stop reason: HOSPADM

## 2023-07-20 RX ORDER — BISACODYL 5 MG/1
5 TABLET, DELAYED RELEASE ORAL DAILY PRN
Status: DISCONTINUED | OUTPATIENT
Start: 2023-07-20 | End: 2023-07-23

## 2023-07-20 RX ORDER — ONDANSETRON 2 MG/ML
4 INJECTION INTRAMUSCULAR; INTRAVENOUS ONCE
Status: COMPLETED | OUTPATIENT
Start: 2023-07-20 | End: 2023-07-20

## 2023-07-20 RX ORDER — NICOTINE POLACRILEX 4 MG
15 LOZENGE BUCCAL
Status: DISCONTINUED | OUTPATIENT
Start: 2023-07-20 | End: 2023-07-25 | Stop reason: HOSPADM

## 2023-07-20 RX ORDER — SODIUM CHLORIDE 0.9 % (FLUSH) 0.9 %
10 SYRINGE (ML) INJECTION EVERY 12 HOURS SCHEDULED
Status: DISCONTINUED | OUTPATIENT
Start: 2023-07-20 | End: 2023-07-25 | Stop reason: HOSPADM

## 2023-07-20 RX ORDER — BISACODYL 10 MG
10 SUPPOSITORY, RECTAL RECTAL DAILY PRN
Status: DISCONTINUED | OUTPATIENT
Start: 2023-07-20 | End: 2023-07-23

## 2023-07-20 RX ADMIN — SODIUM CHLORIDE, POTASSIUM CHLORIDE, SODIUM LACTATE AND CALCIUM CHLORIDE 100 ML/HR: 600; 310; 30; 20 INJECTION, SOLUTION INTRAVENOUS at 18:21

## 2023-07-20 RX ADMIN — DIATRIZOATE MEGLUMINE AND DIATRIZOATE SODIUM 15 ML: 660; 100 LIQUID ORAL; RECTAL at 08:58

## 2023-07-20 RX ADMIN — ONDANSETRON 4 MG: 2 INJECTION INTRAMUSCULAR; INTRAVENOUS at 08:48

## 2023-07-20 RX ADMIN — AZTREONAM 2 G: 2 INJECTION, POWDER, LYOPHILIZED, FOR SOLUTION INTRAMUSCULAR; INTRAVENOUS at 12:29

## 2023-07-20 RX ADMIN — METRONIDAZOLE 500 MG: 5 INJECTION, SOLUTION INTRAVENOUS at 21:33

## 2023-07-20 RX ADMIN — HEPARIN SODIUM 5000 UNITS: 5000 INJECTION, SOLUTION INTRAVENOUS; SUBCUTANEOUS at 20:10

## 2023-07-20 RX ADMIN — SODIUM CHLORIDE 1000 ML: 9 INJECTION, SOLUTION INTRAVENOUS at 09:34

## 2023-07-20 RX ADMIN — METRONIDAZOLE 500 MG: 500 INJECTION, SOLUTION INTRAVENOUS at 13:19

## 2023-07-20 RX ADMIN — SODIUM CHLORIDE 1000 MG: 900 INJECTION INTRAVENOUS at 15:18

## 2023-07-20 RX ADMIN — SODIUM CHLORIDE, POTASSIUM CHLORIDE, SODIUM LACTATE AND CALCIUM CHLORIDE 1000 ML: 600; 310; 30; 20 INJECTION, SOLUTION INTRAVENOUS at 13:19

## 2023-07-20 RX ADMIN — Medication 10 ML: at 20:12

## 2023-07-20 NOTE — PLAN OF CARE
Goal Outcome Evaluation:      Pt arrived to 5G this afternoon. A&Ox4. VSS. EKG done-NSR. Now down to 2L NC. Home CPAP at bedside. NG draining brown bile. Pt with no nausea since NG placement. Bowel sounds faint. NPO with ice chips and popsicles. 1L LR bolus given and cont LR now running @ 100. IV ABX given. Voiding well. Safety maintained.

## 2023-07-20 NOTE — ED PROVIDER NOTES
"Subjective   History of Present Illness  Patient is a pleasant 72-year-old female with a history of large inguinal hernia presents today with abdominal discomfort, constipation, nausea, and vomiting.  States that her last normal bowel movement was on Sunday, approximately 4 days ago.  Approximately 24 to 36 hours ago she developed nause and vomiting.  Days used an enema yesterday morning had a very small bowel movement but given that the emesis over the past 24 hours has smelled like stool they presented to the emergency department for evaluation.  Patient notes that she did have mild to moderate pain but denies significant pain associated with these episodes.  She admits to mild nausea now but denies pain at the current time.  Denies fever, chills, chest pain, difficulty breathing, diarrhea, or other acute complaints.    Review of Systems   All other systems reviewed and are negative.    Past Medical History:   Diagnosis Date    Anemia     Chronic kidney disease, stage 3     Chronic pain in left shoulder     Depression     Diabetes mellitus     Disease of thyroid gland     as a child- underactive     Gout     Heart disease     History of Clostridium difficile infection     History of kidney stones     Hyperlipidemia     Hypertension     IBS (irritable bowel syndrome)     Kidney stone     Obesity     PHYLLIS on CPAP     Set to 10     Osteoarthritis of right elbow     Osteoporosis     Primary osteoarthritis of left knee     Rotator cuff tear     Sjogren's syndrome     SLE (systemic lupus erythematosus)     in remission    SOB (shortness of breath)        Allergies   Allergen Reactions    Erythromycin Hives    Duract [Bromfenac] Hives    Duricef [Cefadroxil] Hives    Lamisil [Terbinafine] Other (See Comments)     \"IT CAUSED chemically induced LUPUS\"    Lasix [Furosemide] Other (See Comments)     PT STATES \"IM JUST VERY SENSITIVE TO IT.\"    Mevacor [Lovastatin] Other (See Comments)     PT REPORTS \"IT JUST MADE ME FEEL " "SICK, I COULDN'T TOLERATE IT NOTHING SPECIFIC.\"    Sulfa Antibiotics Hives    Sulfanilamide     Zocor [Simvastatin] Other (See Comments)     \"IT JUST MADE ME FEEL SICK, I COULDN'T TOLERATE IT. NOTHING SPECIFIC.\"         Past Surgical History:   Procedure Laterality Date    APPENDECTOMY      CARDIAC CATHETERIZATION      no stents    CATARACT EXTRACTION      bilateral    CHOLECYSTECTOMY      COLONOSCOPY  2017    ENDOSCOPY      HYSTERECTOMY      OTHER SURGICAL HISTORY      Vitrectomy    TOTAL KNEE ARTHROPLASTY Left 8/26/2019    Procedure: TOTAL KNEE ARTHROPLASTY LEFT;  Surgeon: Rony Rojas MD;  Location: [facility];  Service: Orthopedics       Family History   Problem Relation Age of Onset    Stroke Mother     Heart disease Mother     Hypertension Mother     Heart attack Mother     Deep vein thrombosis Mother     Osteoarthritis Mother     Stroke Father     Heart disease Father     Hypertension Father     Heart attack Father     Cancer Other     Diabetes Other     Heart disease Other     Hypertension Other     Heart disease Other     Hypertension Other     Heart attack Other        Social History     Socioeconomic History    Marital status:    Tobacco Use    Smoking status: Never    Smokeless tobacco: Never   Vaping Use    Vaping Use: Never used   Substance and Sexual Activity    Alcohol use: No    Drug use: No    Sexual activity: Defer           Objective   Physical Exam  Vitals and nursing note reviewed.   Constitutional:       General: She is not in acute distress.  HENT:      Head: Normocephalic and atraumatic.   Eyes:      Conjunctiva/sclera: Conjunctivae normal.      Pupils: Pupils are equal, round, and reactive to light.   Cardiovascular:      Rate and Rhythm: Normal rate and regular rhythm.      Heart sounds: Normal heart sounds.   Pulmonary:      Effort: Pulmonary effort is normal. No respiratory distress.      Breath sounds: Normal breath sounds.   Abdominal:      General: Bowel sounds are normal. " There is no distension.      Palpations: Abdomen is soft. There is no mass.      Tenderness: There is abdominal tenderness (Mild tenderness to palpation over her lower abdominal hernia.  Hernia is very large but is reducible). There is no rebound.   Musculoskeletal:         General: Normal range of motion.      Cervical back: Normal range of motion and neck supple.   Skin:     General: Skin is warm and dry.   Neurological:      Mental Status: She is alert and oriented to person, place, and time.   Psychiatric:         Behavior: Behavior normal.         Thought Content: Thought content normal.       Procedures           ED Course      Recent Results (from the past 24 hour(s))   Comprehensive Metabolic Panel    Collection Time: 07/20/23  8:43 AM    Specimen: Blood   Result Value Ref Range    Glucose 189 (H) 65 - 99 mg/dL    BUN 64 (H) 8 - 23 mg/dL    Creatinine 1.94 (H) 0.57 - 1.00 mg/dL    Sodium 142 136 - 145 mmol/L    Potassium 4.1 3.5 - 5.2 mmol/L    Chloride 99 98 - 107 mmol/L    CO2 27.0 22.0 - 29.0 mmol/L    Calcium 11.5 (H) 8.6 - 10.5 mg/dL    Total Protein 7.7 6.0 - 8.5 g/dL    Albumin 4.4 3.5 - 5.2 g/dL    ALT (SGPT) 17 1 - 33 U/L    AST (SGOT) 23 1 - 32 U/L    Alkaline Phosphatase 74 39 - 117 U/L    Total Bilirubin 0.6 0.0 - 1.2 mg/dL    Globulin 3.3 gm/dL    A/G Ratio 1.3 g/dL    BUN/Creatinine Ratio 33.0 (H) 7.0 - 25.0    Anion Gap 16.0 (H) 5.0 - 15.0 mmol/L    eGFR 27.1 (L) >60.0 mL/min/1.73   Lipase    Collection Time: 07/20/23  8:43 AM    Specimen: Blood   Result Value Ref Range    Lipase 15 13 - 60 U/L   Lactic Acid, Plasma    Collection Time: 07/20/23  8:43 AM    Specimen: Blood   Result Value Ref Range    Lactate 3.1 (C) 0.5 - 2.0 mmol/L   High Sensitivity Troponin T    Collection Time: 07/20/23  8:43 AM    Specimen: Blood   Result Value Ref Range    HS Troponin T 35 (H) <10 ng/L   Green Top (Gel)    Collection Time: 07/20/23  8:43 AM   Result Value Ref Range    Extra Tube Hold for add-ons.     Lavender Top    Collection Time: 07/20/23  8:43 AM   Result Value Ref Range    Extra Tube hold for add-on    Gold Top - SST    Collection Time: 07/20/23  8:43 AM   Result Value Ref Range    Extra Tube Hold for add-ons.    Gray Top    Collection Time: 07/20/23  8:43 AM   Result Value Ref Range    Extra Tube Hold for add-ons.    Light Blue Top    Collection Time: 07/20/23  8:43 AM   Result Value Ref Range    Extra Tube Hold for add-ons.    CBC Auto Differential    Collection Time: 07/20/23  8:43 AM    Specimen: Blood   Result Value Ref Range    WBC 16.30 (H) 3.40 - 10.80 10*3/mm3    RBC 4.72 3.77 - 5.28 10*6/mm3    Hemoglobin 14.8 12.0 - 15.9 g/dL    Hematocrit 44.2 34.0 - 46.6 %    MCV 93.6 79.0 - 97.0 fL    MCH 31.4 26.6 - 33.0 pg    MCHC 33.5 31.5 - 35.7 g/dL    RDW 13.2 12.3 - 15.4 %    RDW-SD 45.5 37.0 - 54.0 fl    MPV 11.7 6.0 - 12.0 fL    Platelets 294 140 - 450 10*3/mm3    Neutrophil % 89.4 (H) 42.7 - 76.0 %    Lymphocyte % 5.1 (L) 19.6 - 45.3 %    Monocyte % 4.7 (L) 5.0 - 12.0 %    Eosinophil % 0.0 (L) 0.3 - 6.2 %    Basophil % 0.1 0.0 - 1.5 %    Immature Grans % 0.7 (H) 0.0 - 0.5 %    Neutrophils, Absolute 14.58 (H) 1.70 - 7.00 10*3/mm3    Lymphocytes, Absolute 0.83 0.70 - 3.10 10*3/mm3    Monocytes, Absolute 0.77 0.10 - 0.90 10*3/mm3    Eosinophils, Absolute 0.00 0.00 - 0.40 10*3/mm3    Basophils, Absolute 0.01 0.00 - 0.20 10*3/mm3    Immature Grans, Absolute 0.11 (H) 0.00 - 0.05 10*3/mm3    nRBC 0.0 0.0 - 0.2 /100 WBC   High Sensitivity Troponin T 2Hr    Collection Time: 07/20/23 11:25 AM    Specimen: Blood   Result Value Ref Range    HS Troponin T 30 (H) <10 ng/L    Troponin T Delta -5 (L) >=-4 - <+4 ng/L   STAT Lactic Acid, Reflex    Collection Time: 07/20/23 11:25 AM    Specimen: Blood   Result Value Ref Range    Lactate 1.4 0.5 - 2.0 mmol/L   Urinalysis With Microscopic If Indicated (No Culture) - Urine, Clean Catch    Collection Time: 07/20/23 12:09 PM    Specimen: Urine, Clean Catch   Result Value  Ref Range    Color, UA Dark Yellow (A) Yellow, Straw    Appearance, UA Cloudy (A) Clear    pH, UA <=5.0 5.0 - 8.0    Specific Gravity, UA 1.022 1.001 - 1.030    Glucose, UA Negative Negative    Ketones, UA Trace (A) Negative    Bilirubin, UA Negative Negative    Blood, UA Negative Negative    Protein, UA Trace (A) Negative    Leuk Esterase, UA Moderate (2+) (A) Negative    Nitrite, UA Negative Negative    Urobilinogen, UA 0.2 E.U./dL 0.2 - 1.0 E.U./dL   Urinalysis, Microscopic Only - Urine, Clean Catch    Collection Time: 07/20/23 12:09 PM    Specimen: Urine, Clean Catch   Result Value Ref Range    RBC, UA 3-6 (A) None Seen, 0-2 /HPF    WBC, UA 13-20 (A) None Seen, 0-2 /HPF    Bacteria, UA 3+ (A) None Seen, Trace /HPF    Squamous Epithelial Cells, UA 21-30 (A) None Seen, 0-2 /HPF    Yeast, UA Small/1+ Budding Yeast None Seen /HPF    Hyaline Casts, UA 0-6 0 - 6 /LPF    Granular Casts, UA 3-6 None Seen /LPF    Methodology Manual Light Microscopy      Note: In addition to lab results from this visit, the labs listed above may include labs taken at another facility or during a different encounter within the last 24 hours. Please correlate lab times with ED admission and discharge times for further clarification of the services performed during this visit.    XR Abdomen KUB   Final Result   Impression:      1. NG tube within the stomach.         Electronically Signed: Kofi Phelps     7/20/2023 1:31 PM EDT     Workstation ID: [id number]      CT Abdomen Pelvis Without Contrast   Final Result   Impression:   Findings are compatible with small bowel obstruction, caused by lower anterior abdominal wall hernia. Gross gastric distention.      Second hernia is present containing nonobstructed small bowel.      Incompletely imaged right middle and right lower lobe lung infiltrates are suspicious for pneumonia.                  Electronically Signed: Lissett Rhodes MD     7/20/2023 10:55 AM EDT     Workstation ID: [id number]      CT  Chest Without Contrast Diagnostic   Final Result   Impression:   Areas of parabronchial consolidation and groundglass opacity are present within the right middle lobe and right lower lobe, with minimal left lung base involvement, most consistent with multifocal pneumonia.            Electronically Signed: Tyrone Ceja     7/20/2023 10:57 AM EDT     Workstation ID: [id number]        Vitals:    07/20/23 1218 07/20/23 1220 07/20/23 1300 07/20/23 1408   BP:  102/66 112/56 106/61   BP Location:    Left arm   Patient Position:    Sitting   Pulse: 85 80 80 76   Resp:    17   Temp:    97.8 °F (36.6 °C)   TempSrc:    Oral   SpO2: (!) 89% 91% 93% 94%   Weight:       Height:         Medications   sodium chloride 0.9 % flush 10 mL (has no administration in time range)   lactated ringers infusion (has no administration in time range)   cefTRIAXone (ROCEPHIN) 1000 mg/100 mL 0.9% NS (MBP) (has no administration in time range)   metroNIDAZOLE (FLAGYL) IVPB 500 mg (has no administration in time range)   sodium chloride 0.9 % flush 10 mL (has no administration in time range)   sodium chloride 0.9 % flush 10 mL (has no administration in time range)   sodium chloride 0.9 % infusion 40 mL (has no administration in time range)   sennosides-docusate (PERICOLACE) 8.6-50 MG per tablet 2 tablet (has no administration in time range)     And   polyethylene glycol (MIRALAX) packet 17 g (has no administration in time range)     And   bisacodyl (DULCOLAX) EC tablet 5 mg (has no administration in time range)     And   bisacodyl (DULCOLAX) suppository 10 mg (has no administration in time range)   heparin (porcine) 5000 UNIT/ML injection 5,000 Units (has no administration in time range)   Potassium Replacement - Follow Nurse / BPA Driven Protocol (has no administration in time range)   Magnesium Standard Dose Replacement - Follow Nurse / BPA Driven Protocol (has no administration in time range)   Phosphorus Replacement - Follow Nurse / BPA Driven  Protocol (has no administration in time range)   Calcium Replacement - Follow Nurse / BPA Driven Protocol (has no administration in time range)   acetaminophen (TYLENOL) tablet 650 mg (has no administration in time range)   morphine injection 1 mg (has no administration in time range)     And   naloxone (NARCAN) injection 0.4 mg (has no administration in time range)   sertraline (ZOLOFT) tablet 100 mg (has no administration in time range)   nebivolol (BYSTOLIC) tablet 10 mg (has no administration in time range)   diatrizoate meglumine-sodium (GASTROGRAFIN) 66-10 % oral solution 15 mL (15 mL Oral Given 7/20/23 0858)   ondansetron (ZOFRAN) injection 4 mg (4 mg Intravenous Given 7/20/23 0848)   sodium chloride 0.9 % bolus 1,000 mL (0 mL Intravenous Stopped 7/20/23 1139)   aztreonam (AZACTAM) 2000 mg/100 mL 0.9% NS (mbp) (0 g Intravenous Stopped 7/20/23 1308)   metroNIDAZOLE (FLAGYL) IVPB 500 mg (500 mg Intravenous New Bag 7/20/23 1319)   lactated ringers bolus 1,000 mL (1,000 mL Intravenous New Bag 7/20/23 1319)     ECG/EMG Results (last 24 hours)       ** No results found for the last 24 hours. **          No orders to display                                            Medical Decision Making  Case discussed with  and Dr. Kirby.  NG placed to low wall suction. Pt admitted for further evaluation and management.    Problems Addressed:  KARLA (acute kidney injury): complicated acute illness or injury  Nausea and vomiting, unspecified vomiting type: complicated acute illness or injury  Pneumonia of right lower lobe due to infectious organism: complicated acute illness or injury  SBO (small bowel obstruction): complicated acute illness or injury    Amount and/or Complexity of Data Reviewed  Labs: ordered. Decision-making details documented in ED Course.  Radiology: ordered and independent interpretation performed. Decision-making details documented in ED Course.  ECG/medicine tests: ordered and independent  interpretation performed. Decision-making details documented in ED Course.    Risk  Prescription drug management.  Decision regarding hospitalization.        Final diagnoses:   SBO (small bowel obstruction)   Nausea and vomiting, unspecified vomiting type   Pneumonia of right lower lobe due to infectious organism   KARLA (acute kidney injury)       ED Disposition  ED Disposition       ED Disposition   Decision to Admit    Condition   --    Comment   Level of Care: Telemetry [5]   Diagnosis: SBO (small bowel obstruction) [809297]   Admitting Physician: NURA SHABAZZ [[id number]]   Attending Physician: NURA SHABAZZ [[id number]]   Certification: I Certify That Inpatient Hospital Services Are Medically Necessary For Greater Than 2 Midnights                  Hubert Gaston DO  07/20/23 1511

## 2023-07-20 NOTE — CONSULTS
General Surgery Consultation Note    Date of Service: 7/20/2023  Lissett Hatfield Alen  [id number]  1950      Referring Provider: Amanda Kirby MD    Location of Consult: Inpatient     Reason for Consultation: Small bowel obstruction    History of Present Illness:  I am seeing, Lissett Hatfield Alen, in consultation for Amanda Kirby MD regarding small bowel obstruction with complex ventral hernia.  72-year-old lady with a longstanding abdominal wall hernia presents with nausea and vomiting x2 days.  These symptoms have been associated with a decrease in GI function overall.  She currently denies severe abdominal pain.  She has had no significant flatus recently.  She has recently been losing weight slowly secondary to a decrease in appetite and taste and food which she attributes to her Sjogren's.  Otherwise there are no significant modifying factors nor associated symptoms.  She is chronically anticoagulated with Xarelto secondary to history of pulmonary embolism.    Problems Addressed this Visit          Gastrointestinal Abdominal     * (Principal) SBO (small bowel obstruction) - Primary     Other Visit Diagnoses       Nausea and vomiting, unspecified vomiting type        Pneumonia of right lower lobe due to infectious organism        Relevant Medications    cefTRIAXone (ROCEPHIN) 1000 mg/100 mL 0.9% NS (MBP)    KARLA (acute kidney injury)              Diagnoses         Codes Comments    SBO (small bowel obstruction)    -  Primary ICD-10-CM: K56.609  ICD-9-CM: 560.9     Nausea and vomiting, unspecified vomiting type     ICD-10-CM: R11.2  ICD-9-CM: 787.01     Pneumonia of right lower lobe due to infectious organism     ICD-10-CM: J18.9  ICD-9-CM: 486     KARLA (acute kidney injury)     ICD-10-CM: N17.9  ICD-9-CM: 584.9             Past Medical History:   Diagnosis Date    Anemia     Chronic kidney disease, stage 3     Chronic pain in left shoulder     Depression     Diabetes mellitus     Disease of thyroid gland      "as a child- underactive     Gout     Heart disease     History of Clostridium difficile infection     History of kidney stones     Hyperlipidemia     Hypertension     IBS (irritable bowel syndrome)     Kidney stone     Obesity     PHYLLIS on CPAP     Set to 10     Osteoarthritis of right elbow     Osteoporosis     Primary osteoarthritis of left knee     Rotator cuff tear     Sjogren's syndrome     SLE (systemic lupus erythematosus)     in remission    SOB (shortness of breath)        Past Surgical History:    APPENDECTOMY    CARDIAC CATHETERIZATION    no stents    CATARACT EXTRACTION    bilateral    CHOLECYSTECTOMY    COLONOSCOPY    ENDOSCOPY    HYSTERECTOMY    OTHER SURGICAL HISTORY    Vitrectomy    TOTAL KNEE ARTHROPLASTY    Procedure: TOTAL KNEE ARTHROPLASTY LEFT;  Surgeon: Rony Rojas MD;  [facility];  Service: Orthopedics       Allergies   Allergen Reactions    Erythromycin Hives    Duract [Bromfenac] Hives    Duricef [Cefadroxil] Hives    Lamisil [Terbinafine] Other (See Comments)     \"IT CAUSED chemically induced LUPUS\"    Lasix [Furosemide] Other (See Comments)     PT STATES \"IM JUST VERY SENSITIVE TO IT.\"    Mevacor [Lovastatin] Other (See Comments)     PT REPORTS \"IT JUST MADE ME FEEL SICK, I COULDN'T TOLERATE IT NOTHING SPECIFIC.\"    Sulfa Antibiotics Hives    Sulfanilamide     Zocor [Simvastatin] Other (See Comments)     \"IT JUST MADE ME FEEL SICK, I COULDN'T TOLERATE IT. NOTHING SPECIFIC.\"         No current facility-administered medications on file prior to encounter.     Current Outpatient Medications on File Prior to Encounter   Medication Sig Dispense Refill    acetaminophen (TYLENOL) 650 MG 8 hr tablet Take 1 tablet by mouth 2 (Two) Times a Day.      Rivaroxaban (XARELTO) 2.5 MG tablet Take 1 tablet by mouth Every Night.      acetaminophen (TYLENOL) 500 MG tablet Take 1 tablet by mouth Daily.      AMLODIPINE BESYLATE PO Take 10 mg by mouth Daily.      Bumetanide (BUMEX PO) Take 2 mg by " mouth Daily.      docusate sodium 100 MG capsule Take 1 capsule by mouth 2 (Two) Times a Day. (Patient not taking: Reported on 7/20/2023) 60 each 0    famotidine (PEPCID) 20 MG tablet Take 20 mg by mouth Daily. (Patient not taking: Reported on 7/20/2023)      febuxostat (ULORIC) 40 MG tablet Take 1 tablet by mouth Daily.      fenofibrate micronized (LOFIBRA) 134 MG capsule Take 134 mg by mouth Daily. (Patient not taking: Reported on 7/20/2023)      hydroxychloroquine (PLAQUENIL) 200 MG tablet Take 200 mg by mouth 2 (Two) Times a Day. (Patient not taking: Reported on 7/20/2023)      nebivolol (BYSTOLIC) 10 MG tablet Take 1 tablet by mouth Daily.      oxybutynin XL (DITROPAN-XL) 5 MG 24 hr tablet Take 1 tablet by mouth Daily.      pregabalin (LYRICA) 100 MG capsule Take 1 capsule by mouth 2 (Two) Times a Day.      rosuvastatin (CRESTOR) 5 MG tablet Take 5 mg by mouth Daily. (Patient not taking: Reported on 7/20/2023)      sertraline (ZOLOFT) 100 MG tablet Take 1 tablet by mouth Daily.      [DISCONTINUED] aspirin 325 MG tablet Take 1 tablet by mouth Daily for 1 month 30 tablet 0    [DISCONTINUED] cyclobenzaprine (FLEXERIL) 10 MG tablet Take 10 mg by mouth 3 (Three) Times a Day As Needed for Muscle Spasms.      [DISCONTINUED] DULoxetine (CYMBALTA) 60 MG capsule Take 60 mg by mouth Daily.      [DISCONTINUED] insulin glargine (LANTUS) 100 UNIT/ML injection Inject 35 Units under the skin into the appropriate area as directed Every Night. 200unit       [DISCONTINUED] linagliptin (TRADJENTA) 5 MG tablet tablet Take 5 mg by mouth Daily.      [DISCONTINUED] metoprolol succinate XL (TOPROL-XL) 50 MG 24 hr tablet Take 75 mg by mouth Daily.      [DISCONTINUED] pioglitazone (ACTOS) 45 MG tablet Take 45 mg by mouth Daily.           Current Facility-Administered Medications:     acetaminophen (TYLENOL) tablet 650 mg, 650 mg, Oral, Q4H PRN, Amanda Kirby MD    sennosides-docusate (PERICOLACE) 8.6-50 MG per tablet 2 tablet, 2 tablet,  Oral, BID **AND** polyethylene glycol (MIRALAX) packet 17 g, 17 g, Oral, Daily PRN **AND** bisacodyl (DULCOLAX) EC tablet 5 mg, 5 mg, Oral, Daily PRN **AND** bisacodyl (DULCOLAX) suppository 10 mg, 10 mg, Rectal, Daily PRN, Amanda Kirby MD    Calcium Replacement - Follow Nurse / BPA Driven Protocol, , Does not apply, PRN, Amanda Kirby MD    cefTRIAXone (ROCEPHIN) 1000 mg/100 mL 0.9% NS (MBP), 1,000 mg, Intravenous, Q24H, Amanda Kirby MD, 1,000 mg at 07/20/23 1518    dextrose (D50W) (25 g/50 mL) IV injection 25 g, 25 g, Intravenous, Q15 Min PRN, Amanda Kirby MD    dextrose (GLUTOSE) oral gel 15 g, 15 g, Oral, Q15 Min PRN, Amanda Kirby MD    glucagon (GLUCAGEN) injection 1 mg, 1 mg, Intramuscular, Q15 Min PRN, Amanda Kirby MD    heparin (porcine) 5000 UNIT/ML injection 5,000 Units, 5,000 Units, Subcutaneous, Q12H, Amanda Kirby MD    insulin regular (humuLIN R,novoLIN R) injection 2-7 Units, 2-7 Units, Subcutaneous, Q6H, Amanda Kirby MD    lactated ringers infusion, 100 mL/hr, Intravenous, Continuous, Amanda Kirby MD    Magnesium Standard Dose Replacement - Follow Nurse / BPA Driven Protocol, , Does not apply, PRN, Amanda Kirby MD    metroNIDAZOLE (FLAGYL) IVPB 500 mg, 500 mg, Intravenous, Q8H, Amanda Kirby MD    morphine injection 1 mg, 1 mg, Intravenous, Q4H PRN **AND** naloxone (NARCAN) injection 0.4 mg, 0.4 mg, Intravenous, Q5 Min PRN, Amanda Kirby MD    [START ON 7/21/2023] nebivolol (BYSTOLIC) tablet 10 mg, 10 mg, Oral, Daily, Amanda Kirby MD    Phosphorus Replacement - Follow Nurse / BPA Driven Protocol, , Does not apply, PRN, Amanda Kirby MD    Potassium Replacement - Follow Nurse / BPA Driven Protocol, , Does not apply, PRN, Amanda Kirby MD    rosuvastatin (CRESTOR) tablet 5 mg, 5 mg, Oral, Nightly, Amanda Kirby MD    [START ON 7/21/2023] sertraline (ZOLOFT) tablet 100 mg, 100 mg, Oral, Daily, Amanda Kirby MD    sodium chloride 0.9 % flush 10 mL, 10 mL, Intravenous, PRN,  Amanda Kirby MD    sodium chloride 0.9 % flush 10 mL, 10 mL, Intravenous, Q12H, Amanda Kirby MD    sodium chloride 0.9 % flush 10 mL, 10 mL, Intravenous, PRN, Amanda Kirby MD    sodium chloride 0.9 % infusion 40 mL, 40 mL, Intravenous, PRN, Amanda Kirby MD    Family History   Problem Relation Age of Onset    Stroke Mother     Heart disease Mother     Hypertension Mother     Heart attack Mother     Deep vein thrombosis Mother     Osteoarthritis Mother     Stroke Father     Heart disease Father     Hypertension Father     Heart attack Father     Cancer Other     Diabetes Other     Heart disease Other     Hypertension Other     Heart disease Other     Hypertension Other     Heart attack Other      Social History     Socioeconomic History    Marital status:    Tobacco Use    Smoking status: Never    Smokeless tobacco: Never   Vaping Use    Vaping Use: Never used   Substance and Sexual Activity    Alcohol use: No    Drug use: No    Sexual activity: Defer       Review of Systems:  Review of Systems   Constitutional:  Positive for appetite change. Negative for fatigue and fever.   HENT:  Negative for dental problem, hearing loss and rhinorrhea.    Eyes:  Negative for photophobia and visual disturbance.   Respiratory:  Positive for cough. Negative for stridor.    Cardiovascular:  Negative for chest pain and leg swelling.   Gastrointestinal:  Positive for abdominal distention, nausea and vomiting. Negative for abdominal pain.   Endocrine: Negative for polyphagia and polyuria.   Genitourinary:  Negative for difficulty urinating, dysuria and hematuria.   Musculoskeletal:  Negative for arthralgias, joint swelling and neck pain.   Skin:  Negative for color change and pallor.   Allergic/Immunologic: Negative for immunocompromised state.   Neurological:  Negative for dizziness, syncope and numbness.   Hematological:  Negative for adenopathy.   Psychiatric/Behavioral:  Negative for agitation, dysphoric mood and sleep  "disturbance.    Otherwise the 12 point review of systems is negative.    /61 (BP Location: Left arm, Patient Position: Sitting)   Pulse 75   Temp 97.8 °F (36.6 °C) (Oral)   Resp 17   Ht 167.6 cm (66\")   Wt 82.7 kg (182 lb 6.4 oz)   SpO2 91%   BMI 29.44 kg/m²   Body mass index is 29.44 kg/m².    General: Nasogastric tube in place, dark output  HEENT: PER, no icterus, normal sclerae  Cardiac: regular rhythm,  no audible rubs  Pulmonary: bilateral breath sounds, nonlabored  Abdominal: No peritonitis, soft, multiple hernias in the infraumbilical region noted, reducible  Neurologic: awake, alert, no obvious focal deficits  Extremities: warm, no edema  Skin: no obvious rashes nor worrisome lesions seen     CBC  Results from last 7 days   Lab Units 07/20/23  0843   WBC 10*3/mm3 16.30*   HEMOGLOBIN g/dL 14.8   HEMATOCRIT % 44.2   PLATELETS 10*3/mm3 294       CMP  Results from last 7 days   Lab Units 07/20/23  0843   SODIUM mmol/L 142   POTASSIUM mmol/L 4.1   CHLORIDE mmol/L 99   CO2 mmol/L 27.0   BUN mg/dL 64*   CREATININE mg/dL 1.94*   CALCIUM mg/dL 11.5*   BILIRUBIN mg/dL 0.6   ALK PHOS U/L 74   ALT (SGPT) U/L 17   AST (SGOT) U/L 23   GLUCOSE mg/dL 189*       Radiology  Imaging Results (Last 72 Hours)       Procedure Component Value Units Date/Time    XR Abdomen KUB [[id number]] Collected: 07/20/23 1330     Updated: 07/20/23 1334    Narrative:      XR ABDOMEN KUB    Date of Exam: 7/20/2023 1:15 PM EDT    Indication: NG placement    Comparison: None available.    Findings:  NG tube within the stomach. There is mild bibasilar opacities either atelectasis or pneumonia. There are mildly dilated loops of bowel within the visualized abdomen.      Impression:      Impression:    1. NG tube within the stomach.      Electronically Signed: Kofi Phelps    7/20/2023 1:31 PM EDT    Workstation ID: [id number]    CT Chest Without Contrast Diagnostic [[id number]] Collected: 07/20/23 1054     Updated: 07/20/23 1100    Narrative: "      CT CHEST WO CONTRAST DIAGNOSTIC    Date of Exam: 7/20/2023 10:41 AM EDT    Indication: abd pain, hypoxia.    Comparison: None available.    Technique: Axial CT images were obtained of the chest without contrast administration.  Reconstructed coronal and sagittal images were also obtained. Automated exposure control and iterative construction methods were used.      Findings:  There is no pathologic axillary adenopathy or other worrisome body wall soft tissue finding in the chest. There is no pleural or pericardial effusion. Atherosclerotic, nonaneurysmal thoracic aorta. Small, likely reactive right greater than left hilar   lymph nodes are present, incompletely characterized on this noncontrast exam. Evaluation of the osseous structures demonstrates no evidence of acute fracture or aggressive osseous lesion, with multilevel spondylosis change present. The lung fields and   straight multifocal areas of peribronchial consolidation and groundglass opacity, most notable within the right middle lobe and right lower lobe.      Impression:      Impression:  Areas of parabronchial consolidation and groundglass opacity are present within the right middle lobe and right lower lobe, with minimal left lung base involvement, most consistent with multifocal pneumonia.        Electronically Signed: Tyrone Ceja    7/20/2023 10:57 AM EDT    Workstation ID: [id number]    CT Abdomen Pelvis Without Contrast [[id number]] Collected: 07/20/23 1050     Updated: 07/20/23 1058    Narrative:      CT ABDOMEN PELVIS WO CONTRAST    Date of Exam: 7/20/2023 10:41 AM EDT    Indication: abd pain, n/v.    Comparison: 5/30/2023    Technique: Axial CT images were obtained of the abdomen and pelvis without the administration of contrast. Reconstructed coronal and sagittal images were also obtained. Automated exposure control and iterative construction methods were used.      Findings:  There is new incompletely imaged fairly extensive infiltrate of  the right middle and right lower lobes. There are no pleural effusions. The stomach is markedly dilated and contains fluid, debris, and air. The stomach was only mildly distended on the   prior exam. There are cholecystectomy clips. The pancreas and right adrenal gland appear normal. Small left adrenal lesion is compatible with an adenoma. There are no renal stones or hydronephrosis. 2 small hypodense left renal lesions likely represent   proteinaceous or milk of calcium cysts. There is new moderately severe dilatation of small bowel loops to the level of an anterior abdominal wall hernia which contains dilated small bowel and peritoneal fat. The small bowel distal to the hernia is   decompressed. There are multiple surgical clips and sutures in the right lower quadrant. The colon is normal in size. There is diverticulosis without acute diverticulitis. There is a second low right anterior abdominal wall hernia containing nondilated   small bowel.          Impression:      Impression:  Findings are compatible with small bowel obstruction, caused by lower anterior abdominal wall hernia. Gross gastric distention.    Second hernia is present containing nonobstructed small bowel.    Incompletely imaged right middle and right lower lobe lung infiltrates are suspicious for pneumonia.            Electronically Signed: Lissett Rhodes MD    7/20/2023 10:55 AM EDT    Workstation ID: [id number]              Assessment:  Small bowel obstruction  Complex lower abdominal wall hernia  Right lower lobe infiltrates, pneumonia, possible aspiration  Chronic anticoagulation secondary to pulmonary embolism  Diabetes mellitus type 2  Hypertension  Sjogren's syndrome    Plan:  N.p.o., nasogastric suction, GI rest, IV antibiotics (possible aspiration pneumonitis), and volume resuscitation for now.  Her abdomen is soft and it feels that all hernias have been reduced.  She has not had return of flatus yet.  We are holding her chronic  anticoagulation, Xarelto.  Will follow along.  Prior CT and MR imaging reviewed.    Tommie Vale MD  07/20/23  17:25 EDT

## 2023-07-20 NOTE — H&P
[facility] Medicine Services  HISTORY AND PHYSICAL    Patient Name: Lissett Lowry  : 1950  MRN: [id number]  Primary Care Physician: Konstantin Gibson MD  Date of admission: 2023      Subjective   Subjective     Chief Complaint:  Vomiting, abdominal pain    HPI:  Lissett Lowry is a 72 y.o. female w h/o large abdominal hernia, undergoing OP w/u for surgery w Dr FAYE, DMII, h/o PE  gary-op who presents after 2+ days of abdominal pain, dark emesis. Persistently since Tuesday night. Had small bowel movement yesterday with OTC enema but no other bowel movements during this period. Worsening pain and vomiting until finally agreeable to admission here.  No significant cough or dyspnea, no home O2  Last dose of xarelto Monday night (threw up Tuesday night dose)  No history of SBO, some chronic constipation  Reports 100 lb weight loss in last 1.5 years - partially from ozempic    Review of Systems   Otherwise reviewed and negative except the above    Personal History     Past Medical History:   Diagnosis Date    Anemia     Chronic kidney disease, stage 3     Chronic pain in left shoulder     Depression     Diabetes mellitus     Disease of thyroid gland     as a child- underactive     Gout     Heart disease     History of Clostridium difficile infection     History of kidney stones     Hyperlipidemia     Hypertension     IBS (irritable bowel syndrome)     Kidney stone     Obesity     PHYLLIS on CPAP     Set to 10     Osteoarthritis of right elbow     Osteoporosis     Primary osteoarthritis of left knee     Rotator cuff tear     Sjogren's syndrome     SLE (systemic lupus erythematosus)     in remission    SOB (shortness of breath)              Past Surgical History:   Procedure Laterality Date    APPENDECTOMY      CARDIAC CATHETERIZATION      no stents    CATARACT EXTRACTION      bilateral    CHOLECYSTECTOMY      COLONOSCOPY  2017    ENDOSCOPY      HYSTERECTOMY      OTHER SURGICAL  HISTORY      Vitrectomy    TOTAL KNEE ARTHROPLASTY Left 8/26/2019    Procedure: TOTAL KNEE ARTHROPLASTY LEFT;  Surgeon: Rony Rojas MD;  Location: [facility];  Service: Orthopedics       Family History: family history includes Cancer in an other family member; Deep vein thrombosis in her mother; Diabetes in an other family member; Heart attack in her father, mother, and another family member; Heart disease in her father, mother, and other family members; Hypertension in her father, mother, and other family members; Osteoarthritis in her mother; Stroke in her father and mother.     Social History:  reports that she has never smoked. She has never used smokeless tobacco. She reports that she does not drink alcohol and does not use drugs.  Social History     Social History Narrative    Not on file       Medications:  Available home medication information reviewed.  Medications Prior to Admission   Medication Sig Dispense Refill Last Dose    acetaminophen (TYLENOL) 650 MG 8 hr tablet Take 1 tablet by mouth 2 (Two) Times a Day.   7/18/2023    Rivaroxaban (XARELTO) 2.5 MG tablet Take 1 tablet by mouth Every Night.   7/18/2023    acetaminophen (TYLENOL) 500 MG tablet Take 1 tablet by mouth Daily.   7/18/2023    AMLODIPINE BESYLATE PO Take 10 mg by mouth Daily.   7/18/2023    Bumetanide (BUMEX PO) Take 2 mg by mouth Daily.   7/18/2023    docusate sodium 100 MG capsule Take 1 capsule by mouth 2 (Two) Times a Day. (Patient not taking: Reported on 7/20/2023) 60 each 0 Not Taking    famotidine (PEPCID) 20 MG tablet Take 20 mg by mouth Daily. (Patient not taking: Reported on 7/20/2023)   Not Taking    febuxostat (ULORIC) 40 MG tablet Take 1 tablet by mouth Daily.   7/18/2023    fenofibrate micronized (LOFIBRA) 134 MG capsule Take 134 mg by mouth Daily. (Patient not taking: Reported on 7/20/2023)   Not Taking    hydroxychloroquine (PLAQUENIL) 200 MG tablet Take 200 mg by mouth 2 (Two) Times a Day. (Patient not taking: Reported  "on 7/20/2023)   Not Taking    nebivolol (BYSTOLIC) 10 MG tablet Take 1 tablet by mouth Daily.   7/18/2023    oxybutynin XL (DITROPAN-XL) 5 MG 24 hr tablet Take 1 tablet by mouth Daily.   7/18/2023    pregabalin (LYRICA) 100 MG capsule Take 1 capsule by mouth 2 (Two) Times a Day.   7/18/2023    rosuvastatin (CRESTOR) 5 MG tablet Take 5 mg by mouth Daily. (Patient not taking: Reported on 7/20/2023)   Not Taking    sertraline (ZOLOFT) 100 MG tablet Take 1 tablet by mouth Daily.   7/18/2023       Allergies   Allergen Reactions    Erythromycin Hives    Duract [Bromfenac] Hives    Duricef [Cefadroxil] Hives    Lamisil [Terbinafine] Other (See Comments)     \"IT CAUSED chemically induced LUPUS\"    Lasix [Furosemide] Other (See Comments)     PT STATES \"IM JUST VERY SENSITIVE TO IT.\"    Mevacor [Lovastatin] Other (See Comments)     PT REPORTS \"IT JUST MADE ME FEEL SICK, I COULDN'T TOLERATE IT NOTHING SPECIFIC.\"    Sulfa Antibiotics Hives    Sulfanilamide     Zocor [Simvastatin] Other (See Comments)     \"IT JUST MADE ME FEEL SICK, I COULDN'T TOLERATE IT. NOTHING SPECIFIC.\"         Objective   Objective     Vital Signs:   Temp:  [97.8 °F (36.6 °C)-98.2 °F (36.8 °C)] 97.8 °F (36.6 °C)  Heart Rate:  [76-90] 76  Resp:  [17-18] 17  BP: ()/(40-75) 106/61  Flow (L/min):  [1-3] 3       Physical Exam   Constitutional: Awake, alert  Eyes: PERRLA, sclerae anicteric, no conjunctival injection  HENT: NCAT, mucous membranes moist, NG in place w dark brown output  Neck: Supple, no thyromegaly, no lymphadenopathy, trachea midline  Respiratory: Clear to auscultation bilaterally, nonlabored respirations   Cardiovascular: RRR, no murmurs, rubs, or gallops, palpable pedal pulses bilaterally  Gastrointestinal: Positive bowel sounds, soft, mildly tender throughout, abdominal hernia very large, does not appear erythematous and is somewhat reducible  Musculoskeletal: No bilateral ankle edema, no clubbing or cyanosis to extremities  Psychiatric: " Appropriate affect, cooperative  Neurologic: Oriented x 3, strength symmetric in all extremities, Cranial Nerves grossly intact to confrontation, speech clear  Skin: No rashes    Result Review:  I have personally reviewed the results from the time of this admission to 7/20/2023 15:38 EDT and agree with these findings:  [x]  Laboratory list / accordion  []  Microbiology  [x]  Radiology: CT chest personally reviewed and interpreted w RLL pneumonia. CT a/p w SBO  []  EKG/Telemetry   []  Cardiology/Vascular   []  Pathology  []  Old records  []  Other:  Most notable findings include:  Elevated WBC  KARLA  RLL pna  SBO assoc w hernia  Mildly elevated lactate now improved    LAB RESULTS:      Lab 07/20/23  1125 07/20/23  0843   WBC  --  16.30*   HEMOGLOBIN  --  14.8   HEMATOCRIT  --  44.2   PLATELETS  --  294   NEUTROS ABS  --  14.58*   IMMATURE GRANS (ABS)  --  0.11*   LYMPHS ABS  --  0.83   MONOS ABS  --  0.77   EOS ABS  --  0.00   MCV  --  93.6   LACTATE 1.4 3.1*         Lab 07/20/23  0843   SODIUM 142   POTASSIUM 4.1   CHLORIDE 99   CO2 27.0   ANION GAP 16.0*   BUN 64*   CREATININE 1.94*   EGFR 27.1*   GLUCOSE 189*   CALCIUM 11.5*         Lab 07/20/23  0843   TOTAL PROTEIN 7.7   ALBUMIN 4.4   GLOBULIN 3.3   ALT (SGPT) 17   AST (SGOT) 23   BILIRUBIN 0.6   ALK PHOS 74   LIPASE 15         Lab 07/20/23  1125 07/20/23  0843   HSTROP T 30* 35*                 UA          7/20/2023    12:09   Urinalysis   Squamous Epithelial Cells, UA 21-30    Specific Gravity, UA 1.022    Ketones, UA Trace    Blood, UA Negative    Leukocytes, UA Moderate (2+)    Nitrite, UA Negative    RBC, UA 3-6    WBC, UA 13-20    Bacteria, UA 3+        Microbiology Results (last 10 days)       ** No results found for the last 240 hours. **            CT Abdomen Pelvis Without Contrast    Result Date: 7/20/2023  CT ABDOMEN PELVIS WO CONTRAST Date of Exam: 7/20/2023 10:41 AM EDT Indication: abd pain, n/v. Comparison: 5/30/2023 Technique: Axial CT images were  obtained of the abdomen and pelvis without the administration of contrast. Reconstructed coronal and sagittal images were also obtained. Automated exposure control and iterative construction methods were used. Findings: There is new incompletely imaged fairly extensive infiltrate of the right middle and right lower lobes. There are no pleural effusions. The stomach is markedly dilated and contains fluid, debris, and air. The stomach was only mildly distended on the prior exam. There are cholecystectomy clips. The pancreas and right adrenal gland appear normal. Small left adrenal lesion is compatible with an adenoma. There are no renal stones or hydronephrosis. 2 small hypodense left renal lesions likely represent proteinaceous or milk of calcium cysts. There is new moderately severe dilatation of small bowel loops to the level of an anterior abdominal wall hernia which contains dilated small bowel and peritoneal fat. The small bowel distal to the hernia is decompressed. There are multiple surgical clips and sutures in the right lower quadrant. The colon is normal in size. There is diverticulosis without acute diverticulitis. There is a second low right anterior abdominal wall hernia containing nondilated small bowel.     Impression: Impression: Findings are compatible with small bowel obstruction, caused by lower anterior abdominal wall hernia. Gross gastric distention. Second hernia is present containing nonobstructed small bowel. Incompletely imaged right middle and right lower lobe lung infiltrates are suspicious for pneumonia. Electronically Signed: Lissett Rhodes MD  7/20/2023 10:55 AM EDT  Workstation ID: [id number]    CT Chest Without Contrast Diagnostic    Result Date: 7/20/2023  CT CHEST WO CONTRAST DIAGNOSTIC Date of Exam: 7/20/2023 10:41 AM EDT Indication: abd pain, hypoxia. Comparison: None available. Technique: Axial CT images were obtained of the chest without contrast administration.  Reconstructed  coronal and sagittal images were also obtained. Automated exposure control and iterative construction methods were used. Findings: There is no pathologic axillary adenopathy or other worrisome body wall soft tissue finding in the chest. There is no pleural or pericardial effusion. Atherosclerotic, nonaneurysmal thoracic aorta. Small, likely reactive right greater than left hilar lymph nodes are present, incompletely characterized on this noncontrast exam. Evaluation of the osseous structures demonstrates no evidence of acute fracture or aggressive osseous lesion, with multilevel spondylosis change present. The lung fields and straight multifocal areas of peribronchial consolidation and groundglass opacity, most notable within the right middle lobe and right lower lobe.     Impression: Impression: Areas of parabronchial consolidation and groundglass opacity are present within the right middle lobe and right lower lobe, with minimal left lung base involvement, most consistent with multifocal pneumonia. Electronically Signed: Tyrone Ceja  7/20/2023 10:57 AM EDT  Workstation ID: [id number]    XR Abdomen KUB    Result Date: 7/20/2023  XR ABDOMEN KUB Date of Exam: 7/20/2023 1:15 PM EDT Indication: NG placement Comparison: None available. Findings: NG tube within the stomach. There is mild bibasilar opacities either atelectasis or pneumonia. There are mildly dilated loops of bowel within the visualized abdomen.     Impression: Impression: 1. NG tube within the stomach. Electronically Signed: Kofi Phelps  7/20/2023 1:31 PM EDT  Workstation ID: [id number]         Assessment & Plan   Assessment & Plan     Active Hospital Problems    Diagnosis  POA    **SBO (small bowel obstruction) [K56.609]  Yes    Sjogren syndrome, unspecified [M35.00]  Unknown    HTN (hypertension) [I10]  Yes    HLD (hyperlipidemia) [E78.5]  Yes    PHYLLIS on CPAP [G47.33, Z99.89]  Not Applicable    Controlled type 2 diabetes mellitus without complication, with  long-term current use of insulin [E11.9, Z79.4]  Not Applicable     Lissett Lowry is a 72 y.o. female w h/o large abdominal hernia, undergoing OP w/u for surgery w Dr FAYE, DMII, h/o PE 2021 gary-op who presents after 2+ days of abdominal pain, dark emesis. Found to have SBO 2/2 abdominal wall hernia    SBO 2/2 abdominal wall hernia  -NG to suction, surgery consulted by ED and pending evaluation  -IVF, pain control    RLL PNA, likely aspiration in setting of significant vomiting  -rocephin + flagyl.   -Would normally use zosyn for GI/pulm coverage, but hives to cefadroxil which has side chain in common w unasyn + zosyn. Ceftriaxone without overlapping side chain so low risk    H/o PE 2021 - PE was perioperative in 2021, on low-dose xarelto since. Last dose 7/17 PM. Will hold, DVT ppx throughout gary-op period given history    KARLA - IVF, repeat in AM    Chronic bumex use - family denies h/o CHF but on high dose bumex. Hold for now given dehydration. Get pre-op EKG now    DMII - hold ozempic. Low-dose NPO SSI  Sjogren - hold HCQ for now  HLD - statin  HTN - continue nebivolol, hold other BP meds  Mood disorder - home sertraline  Multiple listed medication allergies - complicates care, consider allergy referral as OP    DVT prophylaxis:  heparin    CODE STATUS:  FULL CODE  Code Status and Medical Interventions:   Ordered at: 07/20/23 1417     Level Of Support Discussed With:    Patient    Next of Kin (If No Surrogate)     Code Status (Patient has no pulse and is not breathing):    CPR (Attempt to Resuscitate)     Medical Interventions (Patient has pulse or is breathing):    Full Support     Release to patient:    Routine Release       Expected Discharge 7/27 (Discharge date is tentative pending patient's medical condition and is subject to change)    Expected Discharge Date: 7/27/2023; Expected Discharge Time:      Amanda Kirby MD  07/20/23    Level 3 note: 2/3 of the below    1) Problem  Acute or chronic illnesses  or injuries that may pose a threat to life or bodily function: acute SBO c/b hernia    3) Risk  Parenteral controlled substances: IV morphine prn

## 2023-07-21 ENCOUNTER — APPOINTMENT (OUTPATIENT)
Dept: CARDIOLOGY | Facility: HOSPITAL | Age: 73
DRG: 393 | End: 2023-07-21
Payer: MEDICARE

## 2023-07-21 LAB
ANION GAP SERPL CALCULATED.3IONS-SCNC: 10 MMOL/L (ref 5–15)
BACTERIA BLD CULT: ABNORMAL
BH CV ECHO MEAS - EDV(CUBED): 79.5 ML
BH CV ECHO MEAS - EDV(MOD-SP2): 109 ML
BH CV ECHO MEAS - EDV(MOD-SP4): 121 ML
BH CV ECHO MEAS - EF(MOD-BP): 65.1 %
BH CV ECHO MEAS - EF(MOD-SP2): 65.9 %
BH CV ECHO MEAS - EF(MOD-SP4): 66.2 %
BH CV ECHO MEAS - ESV(CUBED): 27 ML
BH CV ECHO MEAS - ESV(MOD-SP2): 37.2 ML
BH CV ECHO MEAS - ESV(MOD-SP4): 40.9 ML
BH CV ECHO MEAS - FS: 30.2 %
BH CV ECHO MEAS - IVS/LVPW: 1 CM
BH CV ECHO MEAS - IVSD: 1.2 CM
BH CV ECHO MEAS - LV DIASTOLIC VOL/BSA (35-75): 63 CM2
BH CV ECHO MEAS - LV MASS(C)D: 184.7 GRAMS
BH CV ECHO MEAS - LV SYSTOLIC VOL/BSA (12-30): 21.3 CM2
BH CV ECHO MEAS - LVIDD: 4.3 CM
BH CV ECHO MEAS - LVIDS: 3 CM
BH CV ECHO MEAS - LVPWD: 1.2 CM
BH CV ECHO MEAS - MV MAX PG: 6.8 MMHG
BH CV ECHO MEAS - MV MEAN PG: 3 MMHG
BH CV ECHO MEAS - MV V2 VTI: 32.1 CM
BH CV ECHO MEAS - RAP SYSTOLE: 8 MMHG
BH CV ECHO MEAS - RVSP: 33 MMHG
BH CV ECHO MEAS - SI(MOD-SP2): 37.4 ML/M2
BH CV ECHO MEAS - SI(MOD-SP4): 41.7 ML/M2
BH CV ECHO MEAS - SV(MOD-SP2): 71.8 ML
BH CV ECHO MEAS - SV(MOD-SP4): 80.1 ML
BH CV ECHO MEAS - TR MAX PG: 25 MMHG
BH CV ECHO MEAS - TR MAX VEL: 250 CM/SEC
BUN SERPL-MCNC: 64 MG/DL (ref 8–23)
BUN/CREAT SERPL: 47.1 (ref 7–25)
CALCIUM SPEC-SCNC: 9.9 MG/DL (ref 8.6–10.5)
CHLORIDE SERPL-SCNC: 105 MMOL/L (ref 98–107)
CO2 SERPL-SCNC: 29 MMOL/L (ref 22–29)
CREAT SERPL-MCNC: 1.36 MG/DL (ref 0.57–1)
DEPRECATED RDW RBC AUTO: 46.5 FL (ref 37–54)
EGFRCR SERPLBLD CKD-EPI 2021: 41.2 ML/MIN/1.73
ERYTHROCYTE [DISTWIDTH] IN BLOOD BY AUTOMATED COUNT: 13.3 % (ref 12.3–15.4)
GLUCOSE BLDC GLUCOMTR-MCNC: 101 MG/DL (ref 70–130)
GLUCOSE BLDC GLUCOMTR-MCNC: 104 MG/DL (ref 70–130)
GLUCOSE BLDC GLUCOMTR-MCNC: 71 MG/DL (ref 70–130)
GLUCOSE BLDC GLUCOMTR-MCNC: 73 MG/DL (ref 70–130)
GLUCOSE BLDC GLUCOMTR-MCNC: 88 MG/DL (ref 70–130)
GLUCOSE BLDC GLUCOMTR-MCNC: 92 MG/DL (ref 70–130)
GLUCOSE SERPL-MCNC: 100 MG/DL (ref 65–99)
HCT VFR BLD AUTO: 33.7 % (ref 34–46.6)
HGB BLD-MCNC: 11.2 G/DL (ref 12–15.9)
MAXIMAL PREDICTED HEART RATE: 147
MCH RBC QN AUTO: 31.5 PG (ref 26.6–33)
MCHC RBC AUTO-ENTMCNC: 33.2 G/DL (ref 31.5–35.7)
MCV RBC AUTO: 94.7 FL (ref 79–97)
PLATELET # BLD AUTO: 191 10*3/MM3 (ref 140–450)
PMV BLD AUTO: 11.9 FL (ref 6–12)
POTASSIUM SERPL-SCNC: 3.5 MMOL/L (ref 3.5–5.2)
POTASSIUM SERPL-SCNC: 3.6 MMOL/L (ref 3.5–5.2)
RBC # BLD AUTO: 3.56 10*6/MM3 (ref 3.77–5.28)
SODIUM SERPL-SCNC: 144 MMOL/L (ref 136–145)
STRESS TARGET HR: 125
WBC NRBC COR # BLD: 6.85 10*3/MM3 (ref 3.4–10.8)

## 2023-07-21 PROCEDURE — 25010000002 METRONIDAZOLE 500 MG/100ML SOLUTION: Performed by: INTERNAL MEDICINE

## 2023-07-21 PROCEDURE — 93321 DOPPLER ECHO F-UP/LMTD STD: CPT

## 2023-07-21 PROCEDURE — 97162 PT EVAL MOD COMPLEX 30 MIN: CPT

## 2023-07-21 PROCEDURE — 85027 COMPLETE CBC AUTOMATED: CPT | Performed by: INTERNAL MEDICINE

## 2023-07-21 PROCEDURE — 93308 TTE F-UP OR LMTD: CPT | Performed by: INTERNAL MEDICINE

## 2023-07-21 PROCEDURE — 0 POTASSIUM CHLORIDE 10 MEQ/100ML SOLUTION: Performed by: INTERNAL MEDICINE

## 2023-07-21 PROCEDURE — 93325 DOPPLER ECHO COLOR FLOW MAPG: CPT

## 2023-07-21 PROCEDURE — 93308 TTE F-UP OR LMTD: CPT

## 2023-07-21 PROCEDURE — 82948 REAGENT STRIP/BLOOD GLUCOSE: CPT

## 2023-07-21 PROCEDURE — 97166 OT EVAL MOD COMPLEX 45 MIN: CPT

## 2023-07-21 PROCEDURE — 93321 DOPPLER ECHO F-UP/LMTD STD: CPT | Performed by: INTERNAL MEDICINE

## 2023-07-21 PROCEDURE — 80048 BASIC METABOLIC PNL TOTAL CA: CPT | Performed by: INTERNAL MEDICINE

## 2023-07-21 PROCEDURE — 25010000002 CEFAZOLIN IN DEXTROSE 2000 MG/ 100 ML SOLUTION: Performed by: INTERNAL MEDICINE

## 2023-07-21 PROCEDURE — 25010000002 HEPARIN (PORCINE) PER 1000 UNITS: Performed by: INTERNAL MEDICINE

## 2023-07-21 PROCEDURE — 84132 ASSAY OF SERUM POTASSIUM: CPT | Performed by: INTERNAL MEDICINE

## 2023-07-21 PROCEDURE — 93325 DOPPLER ECHO COLOR FLOW MAPG: CPT | Performed by: INTERNAL MEDICINE

## 2023-07-21 RX ORDER — POTASSIUM CHLORIDE 7.45 MG/ML
10 INJECTION INTRAVENOUS
Status: DISPENSED | OUTPATIENT
Start: 2023-07-21 | End: 2023-07-21

## 2023-07-21 RX ORDER — POTASSIUM CHLORIDE 7.45 MG/ML
10 INJECTION INTRAVENOUS
Status: COMPLETED | OUTPATIENT
Start: 2023-07-21 | End: 2023-07-22

## 2023-07-21 RX ORDER — CEFAZOLIN SODIUM 2 G/100ML
2000 INJECTION, SOLUTION INTRAVENOUS EVERY 8 HOURS
Status: DISCONTINUED | OUTPATIENT
Start: 2023-07-21 | End: 2023-07-25

## 2023-07-21 RX ADMIN — METRONIDAZOLE 500 MG: 5 INJECTION, SOLUTION INTRAVENOUS at 05:07

## 2023-07-21 RX ADMIN — POTASSIUM CHLORIDE 10 MEQ: 7.46 INJECTION, SOLUTION INTRAVENOUS at 09:42

## 2023-07-21 RX ADMIN — POTASSIUM CHLORIDE 10 MEQ: 7.46 INJECTION, SOLUTION INTRAVENOUS at 06:17

## 2023-07-21 RX ADMIN — ROSUVASTATIN CALCIUM 5 MG: 10 TABLET, COATED ORAL at 20:52

## 2023-07-21 RX ADMIN — Medication 2000 MG: at 21:00

## 2023-07-21 RX ADMIN — HEPARIN SODIUM 5000 UNITS: 5000 INJECTION, SOLUTION INTRAVENOUS; SUBCUTANEOUS at 20:53

## 2023-07-21 RX ADMIN — SODIUM CHLORIDE, POTASSIUM CHLORIDE, SODIUM LACTATE AND CALCIUM CHLORIDE 100 ML/HR: 600; 310; 30; 20 INJECTION, SOLUTION INTRAVENOUS at 05:18

## 2023-07-21 RX ADMIN — POTASSIUM CHLORIDE 10 MEQ: 7.46 INJECTION, SOLUTION INTRAVENOUS at 07:30

## 2023-07-21 RX ADMIN — Medication 10 ML: at 20:53

## 2023-07-21 RX ADMIN — POTASSIUM CHLORIDE 10 MEQ: 7.46 INJECTION, SOLUTION INTRAVENOUS at 20:53

## 2023-07-21 RX ADMIN — HEPARIN SODIUM 5000 UNITS: 5000 INJECTION, SOLUTION INTRAVENOUS; SUBCUTANEOUS at 09:42

## 2023-07-21 RX ADMIN — POTASSIUM CHLORIDE 10 MEQ: 7.46 INJECTION, SOLUTION INTRAVENOUS at 23:21

## 2023-07-21 RX ADMIN — SODIUM CHLORIDE, POTASSIUM CHLORIDE, SODIUM LACTATE AND CALCIUM CHLORIDE 100 ML/HR: 600; 310; 30; 20 INJECTION, SOLUTION INTRAVENOUS at 23:21

## 2023-07-21 RX ADMIN — POTASSIUM CHLORIDE 10 MEQ: 7.46 INJECTION, SOLUTION INTRAVENOUS at 22:10

## 2023-07-21 RX ADMIN — Medication 2000 MG: at 10:00

## 2023-07-21 NOTE — CONSULTS
Malnutrition Severity Assessment    Patient Name:  Lissett Lowry  YOB: 1950  MRN: [id number]  Admit Date:  7/20/2023    Patient meets criteria for : Severe Malnutrition    Comments:  Pt meets criteria for acute severe malnutrition with the indicators of moderate muscle wasting and subcutaneous fat loss, <50% of EEN for >5d, and significant weight loss of 5.2% x1 month. Of note, pt has been on ozempic for ~1.5yrs with associated weight loss - ? All medication induced vs severely limited PO intake reported with Sjogren's Syndrome.    Malnutrition Severity Assessment  Malnutrition Type: Acute Disease or Injury - Related Malnutrition  Malnutrition Type (last 8 hours)       Malnutrition Severity Assessment       Row Name 07/21/23 1354       Malnutrition Severity Assessment    Malnutrition Type Acute Disease or Injury - Related Malnutrition      Row Name 07/21/23 1354       Insufficient Energy Intake     Insufficient Energy Intake Findings Severe    Insufficient Energy Intake  < or equal to 50% of est. energy requirement for > or equal to 5d)      Row Name 07/21/23 1354       Unintentional Weight Loss     Unintentional Weight Loss Findings Severe    Unintentional Weight Loss  Weight loss greater than 5% in one month  5.2% x1 month      Row Name 07/21/23 1354       Muscle Loss    Loss of Muscle Mass Findings Moderate    [place] Moderate - slight depression    Clavicle Bone Region Moderate - some protrusion in females, visible in males    Acromion Bone Region Moderate - acromion may slightly protrude    Scapular Bone Region Moderate - mild depression, bones may show slightly    Dorsal Hand Region Moderate - slight depression    Patellar Region Moderate - patella more prominent, less muscle definition around patella    Anterior Thigh Region Moderate - mild depression on inner thigh    Posterior Calf Region Moderate - some roundness, slight firmness      Row Name 07/21/23 1352       Fat Loss     Subcutaneous Fat Loss Findings Moderate    Orbital Region  Moderate -  somewhat hollowness, slightly dark circles    Upper Arm Region Moderate - some fat tissue, not ample      Row Name 07/21/23 1354       Criteria Met (Must meet criteria for severity in at least 2 of these categories: M Wasting, Fat Loss, Fluid, Secondary Signs, Wt. Status, Intake)    Patient meets criteria for  Severe Malnutrition                    Electronically signed by:  Tiffanie Prajapati MS,RD,LD  07/21/23 13:55 EDT

## 2023-07-21 NOTE — CONSULTS
Clinical Nutrition   Nutrition Assessment  Reason for Visit: Identified at risk by screening criteria, MST score 2+, Consult, Unintentional weight loss, Reduced oral intake    Patient Name: Lissett Lowry  YOB: 1950  MRN: [id number]  Date of Encounter: 07/21/23 13:54 EDT  Admission date: 7/20/2023    Comments:    Pt meets criteria for acute severe malnutrition with the indicators of moderate muscle wasting and subcutaneous fat loss, <50% of EEN for >5d, and significant weight loss of 5.2% x1 month. Of note, pt has been on ozempic for ~1.5yrs with associated weight loss - ? All medication induced vs severely limited PO intake reported with Sjogren's Syndrome.    Advance diet as clinically indicated     Nutrition Assessment   Admission Diagnosis:  SBO (small bowel obstruction) [K56.609]    Problem List:    SBO (small bowel obstruction)    Controlled type 2 diabetes mellitus without complication, with long-term current use of insulin    HTN (hypertension)    HLD (hyperlipidemia)    PHYLLIS on CPAP    Sjogren syndrome, unspecified  Severe Malnutrition    PMH:   She  has a past medical history of Anemia, Chronic kidney disease, stage 3, Chronic pain in left shoulder, Depression, Diabetes mellitus, Disease of thyroid gland, Gout, Heart disease, History of Clostridium difficile infection, History of kidney stones, Hyperlipidemia, Hypertension, IBS (irritable bowel syndrome), Kidney stone, Obesity, PHYLLIS on CPAP, Osteoarthritis of right elbow, Osteoporosis, Primary osteoarthritis of left knee, Rotator cuff tear, Sjogren's syndrome, SLE (systemic lupus erythematosus), and SOB (shortness of breath).    PSH:  She  has a past surgical history that includes Appendectomy; Cholecystectomy; Hysterectomy; Cataract extraction; Other surgical history; Colonoscopy (2017); Esophagogastroduodenoscopy; Cardiac catheterization; and Total knee arthroplasty (Left, 8/26/2019).    Applicable Nutrition Concerns:  "  Skin:  Oral:  GI: NGT to LWS    Applicable Interval History:       Reported/Observed/Food/Nutrition Related History:     Pt laying in bed with spouse at bedside - able to provide most weight/nutrition hx, addition information provided by spouse. Reports last meal tolerated Tuesday afternoon, ate supper but vomited meal. NGT in place to LWS - noted 2125mL OP in past 24hrs. Pt reports XL BM this morning.     In regards to diet hx, reports ~2 meals daily with snacks. When describing meals, spouse indicates full meal ~1 handful is equivalent to food consumed. Has been taking Ozempic for ~1.5yrs with associated wt loss, however discussed importance of intentional gradual loss with lifestyle changes vs unintentional loss due to inadequate energy intake, verbalized understanding. Pt with known Sjogren's syndrome and reports difficulty chewing/swallowing due to limited saliva produced - denies feeling choked. [facility]      Anthropometrics     Flowsheet Rows      Flowsheet Row First Filed Value   Admission Height 167.6 cm (66\") Documented at 07/20/2023 0730   Admission Weight 82.6 kg (182 lb) Documented at 07/20/2023 0730            Height: Height: 167.6 cm (66\")  Last Filed Weight: Weight: 82.7 kg (182 lb 6.4 oz) (07/20/23 1713)  Method: Weight Method: Standing scale  BMI: BMI (Calculated): 29.5  BMI classification: Normal: 18.5-24.9kg/m2  IBW:   130lbs    UBW:     Weight       Weight (kg) Weight (lbs) Weight Method Visit Report   8/22/2019 129 kg  284 lb 6.3 oz  Standing scale     8/26/2019 129 kg  284 lb 6.3 oz  Stated     7/20/2023 82.736 kg  182 lb 6.4 oz  Standing scale      82.555 kg  182 lb  Stated       Weight change: weight loss of 10 lbs (5.2%) over the past 1 month(s)    Intentional?  No    Nutrition Focused Physical Exam     Date:  7/21       Patient meets criteria for malnutrition diagnosis, see MSA note.     Current Nutrition Prescription   PO: NPO Diet NPO Type: Ice Chips  Oral Nutrition Supplement: " N/A  Intake: N/A    Nutrition Diagnosis   Date:  7/21            Updated:    Problem Malnutrition  acute, severe   Etiology Medication induced anorexia, sjogren's syndrome   Signs/Symptoms moderate muscle wasting and subcutaneous fat loss, <50% of EEN for >5d, and significant weight loss of 5.2% x1 month   Status: New    Date:   7/21    Updated:     Problem Predicted suboptimal energy intake   Etiology SBO   Signs/Symptoms NPO x1d   Status: New    Goal:   General: Nutrition to support treatment  PO: Advace diet as medically feasible/appropriate  EN/PN: N/A    Nutrition Intervention      Follow treatment progress, Care plan reviewed, Await begin PO      Monitoring/Evaluation:   Per protocol, I&O, Pertinent labs, GI status, Symptoms, POC/GOC      Tiffanie Prajapati, MS,RD,LD  Time Spent: 25min

## 2023-07-21 NOTE — THERAPY EVALUATION
Patient Name: Lissett Lowry  : 1950    MRN: [id number]                              Today's Date: 2023       Admit Date: 2023    Visit Dx:     ICD-10-CM ICD-9-CM   1. SBO (small bowel obstruction)  K56.609 560.9   2. Nausea and vomiting, unspecified vomiting type  R11.2 787.01   3. Pneumonia of right lower lobe due to infectious organism  J18.9 486   4. KARLA (acute kidney injury)  N17.9 584.9     Patient Active Problem List   Diagnosis    Obesity    Controlled type 2 diabetes mellitus without complication, with long-term current use of insulin    Osteoporosis    Gout    Right leg pain    Right foot pain    Chronic right SI joint pain    Chronic bilateral low back pain without sciatica    BMI 45.0-49.9, adult    Primary osteoarthritis of left knee    Diabetes mellitus    DDD (degenerative disc disease), lumbar    Retrolisthesis of vertebrae    Physical deconditioning    Arthritis of left knee    Status post total left knee replacement    HTN (hypertension)    HLD (hyperlipidemia)    PHYLLIS on CPAP    CKD (chronic kidney disease)    Acute blood loss anemia, mild, asymptomatic    SBO (small bowel obstruction)    Sjogren syndrome, unspecified     Past Medical History:   Diagnosis Date    Anemia     Chronic kidney disease, stage 3     Chronic pain in left shoulder     Depression     Diabetes mellitus     Disease of thyroid gland     as a child- underactive     Gout     Heart disease     History of Clostridium difficile infection     History of kidney stones     Hyperlipidemia     Hypertension     IBS (irritable bowel syndrome)     Kidney stone     Obesity     PHYLLIS on CPAP     Set to 10     Osteoarthritis of right elbow     Osteoporosis     Primary osteoarthritis of left knee     Rotator cuff tear     Sjogren's syndrome     SLE (systemic lupus erythematosus)     in remission    SOB (shortness of breath)      Past Surgical History:   Procedure Laterality Date    APPENDECTOMY      CARDIAC CATHETERIZATION       no stents    CATARACT EXTRACTION      bilateral    CHOLECYSTECTOMY      COLONOSCOPY  2017    ENDOSCOPY      HYSTERECTOMY      OTHER SURGICAL HISTORY      Vitrectomy    TOTAL KNEE ARTHROPLASTY Left 8/26/2019    Procedure: TOTAL KNEE ARTHROPLASTY LEFT;  Surgeon: Rony Rojas MD;  Location: [facility];  Service: Orthopedics      General Information       Row Name 07/21/23 1341          OT Time and Intention    Document Type evaluation  -DHRUV     Mode of Treatment occupational therapy  -DHRUV       Row Name 07/21/23 1341          General Information    Patient Profile Reviewed yes  -DHRUV     Prior Level of Function independent:;ADL's;bed mobility;transfer;all household mobility;community mobility;home management  -DHRUV     Existing Precautions/Restrictions fall;other (see comments)  NG tube to suction  -DHRUV     Barriers to Rehab medically complex  -DHRUV       Row Name 07/21/23 1341          Occupational Profile    Environmental Supports and Barriers (Occupational Profile) Lives with spouse in single level home; ambulates without AD at baseline; has walk-in shower with shower stool, rollator.  -DHRUV       Row Name 07/21/23 1341          Living Environment    People in Home spouse  -DHRUV       Row Name 07/21/23 1341          Home Main Entrance    Number of Stairs, Main Entrance none  -DHRUV       Row Name 07/21/23 1341          Stairs Within Home, Primary    Number of Stairs, Within Home, Primary none  -DHRUV       Row Name 07/21/23 1341          Cognition    Orientation Status (Cognition) oriented x 4  -DHRUV       Row Name 07/21/23 1341          Safety Issues, Functional Mobility    Safety Issues Affecting Function (Mobility) insight into deficits/self-awareness  -DHRUV     Impairments Affecting Function (Mobility) balance;endurance/activity tolerance;postural/trunk control;shortness of breath;strength  -DHRUV               User Key  (r) = Recorded By, (t) = Taken By, (c) = Cosigned By      Initials Name Provider Type    Savi Venegas, OT  Occupational Therapist                     Mobility/ADL's       Row Name 07/21/23 1343          Bed Mobility    Bed Mobility supine-sit;sit-supine  -DHRUV     Supine-Sit [place] (Bed Mobility) supervision  -     Sit-Supine [place] (Bed Mobility) supervision  -     Bed Mobility, Safety Issues impaired trunk control for bed mobility  -     Assistive Device (Bed Mobility) bed rails;head of bed elevated  -DHRUV     Comment, (Bed Mobility) Pt completed with increased time and effort  -       Row Name 07/21/23 1343          Transfers    Transfers sit-stand transfer  -DHRUV       Row Name 07/21/23 1343          Sit-Stand Transfer    Sit-Stand [place] (Transfers) minimum assist (75% patient effort);verbal cues  -     Assistive Device (Sit-Stand Transfers) walker, front-wheeled  -DHRUV     Comment, (Sit-Stand Transfer) cues for HP  -[facility] Name 07/21/23 1343          Functional Mobility    Functional Mobility- Ind. Level not tested  -[facility] Name 07/21/23 1343          Activities of Daily Living    BADL Assessment/Intervention grooming  -DHRUV       Row Name 07/21/23 1343          Grooming Assessment/Training    [place] Level (Grooming) oral care regimen;wash face, hands;set up  -DHRUV     Position (Grooming) sitting up in bed  -DHRUV               User Key  (r) = Recorded By, (t) = Taken By, (c) = Cosigned By      Initials Name Provider Type    Savi Venegas OT Occupational Therapist                   Obj/Interventions       Row Name 07/21/23 1345          Sensory Assessment (Somatosensory)    Sensory Assessment (Somatosensory) UE sensation intact  -[facility] Name 07/21/23 1345          Vision Assessment/Intervention    Visual Impairment/Limitations WFL;other (see comments)  prescription bifocals  -       Row Name 07/21/23 1345          Range of Motion Comprehensive    General Range of Motion bilateral upper extremity ROM WFL  -[facility] Name 07/21/23 1345          Strength Comprehensive  (MMT)    Comment, General Manual Muscle Testing (MMT) Assessment BUWILIAM's grossly 4-/5  -DHRUV       Row Name 07/21/23 3638          Balance    Balance Assessment sitting static balance;sitting dynamic balance;standing static balance;standing dynamic balance  -DHRUV     Static Sitting Balance independent  -DHRUV     Dynamic Sitting Balance standby assist  -DHRUV     Position, Sitting Balance unsupported;sitting edge of bed  -DHRUV     Static Standing Balance contact guard  -DHRUV     Dynamic Standing Balance contact guard  -DHRUV     Position/Device Used, Standing Balance supported;walker, front-wheeled  -DHRUV               User Key  (r) = Recorded By, (t) = Taken By, (c) = Cosigned By      Initials Name Provider Type    DHRUV Savi Persaud, OT Occupational Therapist                   Goals/Plan       Row Name 07/21/23 0310          Transfer Goal 1 (OT)    Activity/Assistive Device (Transfer Goal 1, OT) sit-to-stand/stand-to-sit;toilet;walker, rolling  -DHRUV     [place] Level/Cues Needed (Transfer Goal 1, OT) contact guard required  -DHRUV     Time Frame (Transfer Goal 1, OT) long term goal (LTG);by discharge  -DHRUV     Progress/Outcome (Transfer Goal 1, OT) new goal;goal ongoing  -DHRUV       Row Name 07/21/23 1352          Toileting Goal 1 (OT)    Activity/Device (Toileting Goal 1, OT) adjust/manage clothing;perform perineal hygiene;commode;grab bar/safety frame  -DHRUV     [place] Level/Cues Needed (Toileting Goal 1, OT) contact guard required  -DHRUV     Time Frame (Toileting Goal 1, OT) long term goal (LTG);by discharge  -DHRUV     Progress/Outcome (Toileting Goal 1, OT) new goal;goal ongoing  -DHRUV       Row Name 07/21/23 2202          Grooming Goal 1 (OT)    Activity/Device (Grooming Goal 1, OT) wash face, hands;oral care  -DHRUV     [place] (Grooming Goal 1, OT) supervision required  -DHRUV     Time Frame (Grooming Goal 1, OT) long term goal (LTG);by discharge  -DHRUV     Strategies/Barriers (Grooming Goal 1, OT) standing sink side  -DHRUV      Progress/Outcome (Grooming Goal 1, OT) new goal;goal ongoing  -       Row Name 07/21/23 7475          Therapy Assessment/Plan (OT)    Planned Therapy Interventions (OT) activity tolerance training;BADL retraining;functional balance retraining;occupation/activity based interventions;patient/caregiver education/training;ROM/therapeutic exercise;strengthening exercise;transfer/mobility retraining  -               User Key  (r) = Recorded By, (t) = Taken By, (c) = Cosigned By      Initials Name Provider Type    Savi Venegas OT Occupational Therapist                   Clinical Impression       Row Name 07/21/23 7112          Pain Assessment    Pain Intervention(s) Repositioned  -     Additional Documentation Pain Scale: FACES Pre/Post-Treatment (Group)  -[facility] Name 07/21/23 4671          Pain Scale: FACES Pre/Post-Treatment    Pain: FACES Scale, Pretreatment 2-->hurts little bit  -DHRUV     Posttreatment Pain Rating 2-->hurts little bit  -DHRUV     Pain Location - other (see comments)  coccyx  -     Pre/Posttreatment Pain Comment Pt repositioned in bed with pillow to offload R hip  -[facility] Name 07/21/23 9055          Plan of Care Review    Plan of Care Reviewed With patient;spouse  -     Outcome Evaluation OT eval completed. Pt presents with generalized weakness, decreased standing tolerance, and impaired balance impacting ADL's. Pt SUP for bed mobility, Noe for functional transfers, SEXTON for grooming tasks seated. IP OT services warranted. Recommend discharge home with assist and HHOT.  -[facility] Name 07/21/23 0522          Therapy Assessment/Plan (OT)    Rehab Potential (OT) good, to achieve stated therapy goals  -     Criteria for Skilled Therapeutic Interventions Met (OT) yes;meets criteria;skilled treatment is necessary  -     Therapy Frequency (OT) daily  -       Row Name 07/21/23 3813          Therapy Plan Review/Discharge Plan (OT)    Anticipated Discharge Disposition (OT) home with  assist;home with home health  -DHRUV       Row Name 07/21/23 1347          Vital Signs    O2 Delivery Pre Treatment room air  -DHRUV     O2 Delivery Intra Treatment room air  -DHRUV     O2 Delivery Post Treatment room air  -DHRUV     Pre Patient Position Supine  -DHRUV     Intra Patient Position Standing  -DHRUV     Post Patient Position Supine  -DHRUV       Row Name 07/21/23 1347          Positioning and Restraints    Pre-Treatment Position in bed  -DHRUV     Post Treatment Position bed  -DHRUV     In Bed notified nsg;fowlers;call light within reach;encouraged to call for assist;exit alarm on;with family/caregiver  -DHRUV               User Key  (r) = Recorded By, (t) = Taken By, (c) = Cosigned By      Initials Name Provider Type    Savi Venegas OT Occupational Therapist                   Outcome Measures       Row Name 07/21/23 1353          How much help from another is currently needed...    Putting on and taking off regular lower body clothing? 3  -DHRUV     Bathing (including washing, rinsing, and drying) 2  -DHRUV     Toileting (which includes using toilet bed pan or urinal) 3  -DHRUV     Putting on and taking off regular upper body clothing 3  -DHRUV     Taking care of personal grooming (such as brushing teeth) 3  -DHRUV     Eating meals 3  -DHRUV     AM-PAC 6 Clicks Score (OT) 17  -DHRUV       Row Name 07/21/23 1053          How much help from another person do you currently need...    Turning from your back to your side while in flat bed without using bedrails? 3  -KW     Moving from lying on back to sitting on the side of a flat bed without bedrails? 3  -KW     Moving to and from a bed to a chair (including a wheelchair)? 3  -KW     Climbing 3-5 steps with a railing? 3  -KW     To walk in hospital room? 3  -KW       Row Name 07/21/23 1353 07/21/23 1053       Functional Assessment    Outcome Measure Options AM-PAC 6 Clicks Daily Activity (OT)  -DHRUV AM-PAC 6 Clicks Basic Mobility (PT)  -KW              User Key  (r) = Recorded By, (t) = Taken By, (c) =  Cosigned By      Initials Name Provider Type    Savi Venegas, OT Occupational Therapist    Sherrie Winn PT Physical Therapist                    Occupational Therapy Education       Title: PT OT SLP Therapies (In Progress)       Topic: Occupational Therapy (In Progress)       Point: ADL training (Done)       Description:   Instruct learner(s) on proper safety adaptation and remediation techniques during self care or transfers.   Instruct in proper use of assistive devices.                  Learning Progress Summary             Patient Acceptance, E, VU by DHRUV at 7/21/2023 1354    Comment: Reason for OT consult; OT POC; fall prevention; discharge planning   Family Acceptance, E, VU by DHRUV at 7/21/2023 1354    Comment: Reason for OT consult; OT POC; fall prevention; discharge planning                         Point: Home exercise program (Not Started)       Description:   Instruct learner(s) on appropriate technique for monitoring, assisting and/or progressing therapeutic exercises/activities.                  Learner Progress:  Not documented in this visit.              Point: Precautions (Done)       Description:   Instruct learner(s) on prescribed precautions during self-care and functional transfers.                  Learning Progress Summary             Patient Acceptance, E, VU by DHRUV at 7/21/2023 1354    Comment: Reason for OT consult; OT POC; fall prevention; discharge planning   Family Acceptance, E, VU by DHRUV at 7/21/2023 1354    Comment: Reason for OT consult; OT POC; fall prevention; discharge planning                         Point: Body mechanics (Not Started)       Description:   Instruct learner(s) on proper positioning and spine alignment during self-care, functional mobility activities and/or exercises.                  Learner Progress:  Not documented in this visit.                              User Key       Initials Effective Dates Name Provider Type Discipline    DHRUV 06/16/21 -  Hung  LEELA Hou Occupational Therapist OT                  OT Recommendation and Plan  Planned Therapy Interventions (OT): activity tolerance training, BADL retraining, functional balance retraining, occupation/activity based interventions, patient/caregiver education/training, ROM/therapeutic exercise, strengthening exercise, transfer/mobility retraining  Therapy Frequency (OT): daily  Plan of Care Review  Plan of Care Reviewed With: patient, spouse  Outcome Evaluation: OT eval completed. Pt presents with generalized weakness, decreased standing tolerance, and impaired balance impacting ADL's. Pt SUP for bed mobility, Noe for functional transfers, SEXTON for grooming tasks seated. IP OT services warranted. Recommend discharge home with assist and HHOT.     Time Calculation:   Evaluation Complexity (OT)  Review Occupational Profile/Medical/Therapy History Complexity: expanded/moderate complexity  Assessment, Occupational Performance/Identification of Deficit Complexity: 3-5 performance deficits  Clinical Decision Making Complexity (OT): detailed assessment/moderate complexity  Overall Complexity of Evaluation (OT): moderate complexity     Time Calculation- OT       Row Name 07/21/23 1308             Time Calculation- OT    OT Start Time 1308  -DHRUV      OT Received On 07/21/23  -DHRUV      OT Goal Re-Cert Due Date 07/31/23  -DHRUV         Untimed Charges    OT Eval/Re-eval Minutes 48  -DHRUV         Total Minutes    Untimed Charges Total Minutes 48  -DHRUV       Total Minutes 48  -DHRUV                User Key  (r) = Recorded By, (t) = Taken By, (c) = Cosigned By      Initials Name Provider Type    Savi Venegas OT Occupational Therapist                  Therapy Charges for Today       Code Description Service Date Service Provider Modifiers Qty    83192007637 HC OT EVAL MOD COMPLEXITY 4 7/21/2023 Savi Persaud OT GO 1                 Savi Persaud OT  7/21/2023

## 2023-07-21 NOTE — PROGRESS NOTES
"General Surgery Daily Progress Note    Subjective:  Large BM this morning. Feeling better.    Objective:  /52 (BP Location: Left arm, Patient Position: Lying)   Pulse 73   Temp 98.7 °F (37.1 °C) (Oral)   Resp 18   Ht 167.6 cm (66\")   Wt 82.7 kg (182 lb 6.4 oz)   SpO2 93%   BMI 29.44 kg/m²     General Appearance: Minimal distress  Eyes: Anicteric  Neck: Trachea midline   Cardiovascular:  RRR without murmur nor rub  Lungs:  Bilateral respirations unlabored   Abdomen:  Soft, nontender, reducible hernia  Extremities:  No cyanosis or edema   Skin:  No obvious rashes   Neurologic: awake and conversant     CBC:  Results from last 7 days   Lab Units 07/21/23  0354   WBC 10*3/mm3 6.85   HEMOGLOBIN g/dL 11.2*   HEMATOCRIT % 33.7*   PLATELETS 10*3/mm3 191       CMP:  Results from last 7 days   Lab Units 07/21/23  0354 07/20/23  0843   SODIUM mmol/L 144 142   POTASSIUM mmol/L 3.6 4.1   CHLORIDE mmol/L 105 99   CO2 mmol/L 29.0 27.0   BUN mg/dL 64* 64*   CREATININE mg/dL 1.36* 1.94*   CALCIUM mg/dL 9.9 11.5*   BILIRUBIN mg/dL  --  0.6   ALK PHOS U/L  --  74   ALT (SGPT) U/L  --  17   AST (SGOT) U/L  --  23   GLUCOSE mg/dL 100* 189*         Assessment:  Small bowel obstruction  Complex lower abdominal wall hernia  Right lower lobe infiltrates, pneumonia, possible aspiration  Chronic anticoagulation secondary to pulmonary embolism  Malnutrition    Plan:   Having some return of GI function. Will obtain a SBFT tomorrow. Still with low room air oxygen saturation. As long as her bowel function resumes she would do well with medical and nutritional optimization prior to definite repair of her abdominal wall hernias.  Abdominal binder at all times.    Tommie Vale MD - 7/21/2023, 15:28 EDT         "

## 2023-07-21 NOTE — CASE MANAGEMENT/SOCIAL WORK
Discharge Planning Assessment  [facility]     Patient Name: Lissett Lowry  MRN: [id number]  Today's Date: 7/21/2023    Admit Date: 7/20/2023    Plan: IDP   Discharge Needs Assessment       Row Name 07/21/23 0843       Living Environment    People in Home spouse    Name(s) of People in Home Sam Lowry (Spouse)  [phone] (Mobile)    Current Living Arrangements home    Potentially Unsafe Housing Conditions none    Primary Care Provided by self    Provides Primary Care For no one    Family Caregiver if Needed spouse    Family Caregiver Names Sam Lowry (Spouse)  [phone] (Mobile)    Quality of Family Relationships helpful;involved    Able to Return to Prior Arrangements yes       Resource/Environmental Concerns    Resource/Environmental Concerns none    Transportation Concerns none       Food Insecurity    Within the past 12 months, you worried that your food would run out before you got the money to buy more. Never true    Within the past 12 months, the food you bought just didn't last and you didn't have money to get more. Never true       Transition Planning    Patient/Family Anticipates Transition to home with family    Patient/Family Anticipated Services at Transition none    Transportation Anticipated family or friend will provide       Discharge Needs Assessment    Readmission Within the Last 30 Days no previous admission in last 30 days    Equipment Currently Used at Home cpap;shower chair;rollator    Concerns to be Addressed denies needs/concerns at this time    Anticipated Changes Related to Illness none    Equipment Needed After Discharge none                   Discharge Plan       Row Name 07/21/23 0844       Plan    Plan IDP    Patient/Family in Agreement with Plan yes    Plan Comments CM spoke with the patient and her spouse at the bedside. Patient lives in [facility] in a house with her . She described herself as independent and able to drive. She has a walker at home from a past  knee replacement that is available if she needs it. Otherwise she uses a CPAP regularly and also has a  chair. She has had [facility] in the past but is not current with home health or outpatient services. She confirmed her PCP and insurance with me. She plans to go home at discharge with her spouse to transport. At this time, she denied any needs from CM. CM to follow and assist as needed.    Final Discharge Disposition Code 01 - home or self-care                  Continued Care and Services - Admitted Since 7/20/2023    Coordination has not been started for this encounter.       Expected Discharge Date and Time       Expected Discharge Date Expected Discharge Time    Jul 27, 2023            Demographic Summary    No documentation.                  Functional Status       Row Name 07/21/23 0843       Functional Status    Usual Activity Tolerance moderate    Current Activity Tolerance fair       Physical Activity    On average, how many days per week do you engage in moderate to strenuous exercise (like a brisk walk)? 0 days    On average, how many minutes do you engage in exercise at this level? 0 min    Number of minutes of exercise per week 0       Assessment of Health Literacy    How often do you have someone help you read hospital materials? Never    How often do you have problems learning about your medical condition because of difficulty understanding written information? Never    How often do you have a problem understanding what is told to you about your medical condition? Never    How confident are you filling out medical forms by yourself? Quite a bit    Health Literacy Good       Functional Status, IADL    Medications independent    Meal Preparation independent    Housekeeping independent    Laundry independent    Shopping independent       Mental Status    General Appearance WDL WDL       Mental Status Summary    Recent Changes in Mental Status/Cognitive Functioning no changes        Employment/    Employment Status retired                   Psychosocial    No documentation.                  Abuse/Neglect    No documentation.                  Legal    No documentation.                  Substance Abuse    No documentation.                  Patient Forms    No documentation.                     Yolette Rodriguez RN

## 2023-07-21 NOTE — CONSULTS
INFECTIOUS DISEASES  INPATIENT CONSULT NOTE / INITIAL HOSPITAL VISIT      PATIENT NAME:  Lissett Lowry  YOB: 1950  MEDICAL RECORD NUMBER:  [id number]    Date of Admission:  7/20/2023  Date of Consult: 7/21/2023    Requesting Provider: SAQIB Garcia MD  Evaluating Physician: Durga Mckay MD    Chief Complaint   Patient presents with    Vomiting    Constipation       Reason for Consultation:   Staph aureus bacteremia    History of Present Illness:  Patient is a 73 y.o. female with a past medical history significant for DMII, remote C diff infection, and large abd wall hernia who was admitted to [facility] on 7/20/2023 after presenting to ED with complaints of nausea and vomiting.   Patient states that couple days prior to admission, she developed persistent nausea and vomiting along with abdominal pain.  She subsequently developed a cough.  At presentation she was found have a leukocytosis of 16,000 and creatinine up to 1.9.  Blood cultures were obtained.  CT demonstrated small bowel obstruction.  NG tube was placed.  She was placed on IV fluids and n.p.o.  1 of 2 blood culture sets has returned positive for gram-positive cocci in groups, MSSA by BC ID.  He is currently on cefazolin and metronidazole.  She has indwelling hardware with a left knee replacement and lumbar spine.  She denies any increased pain or inflammation at those sites.    I have been consulted to assist in workup and antimicrobial management of Staph aureus bacteremia.    Past Medical History:   Diagnosis Date    Anemia     Chronic kidney disease, stage 3     Chronic pain in left shoulder     Depression     Diabetes mellitus     Disease of thyroid gland     as a child- underactive     Gout     Heart disease     History of Clostridium difficile infection     History of kidney stones     Hyperlipidemia     Hypertension     IBS (irritable bowel syndrome)     Kidney stone     Obesity     PHYLLIS on CPAP     Set to 10      "Osteoarthritis of right elbow     Osteoporosis     Primary osteoarthritis of left knee     Rotator cuff tear     Sjogren's syndrome     SLE (systemic lupus erythematosus)     in remission    SOB (shortness of breath)        Past Surgical History:   Procedure Laterality Date    APPENDECTOMY      CARDIAC CATHETERIZATION      no stents    CATARACT EXTRACTION      bilateral    CHOLECYSTECTOMY      COLONOSCOPY  2017    ENDOSCOPY      HYSTERECTOMY      OTHER SURGICAL HISTORY      Vitrectomy    TOTAL KNEE ARTHROPLASTY Left 8/26/2019    Procedure: TOTAL KNEE ARTHROPLASTY LEFT;  Surgeon: Rony Rojas MD;  Location: [facility];  Service: Orthopedics       Family History   Problem Relation Age of Onset    Stroke Mother     Heart disease Mother     Hypertension Mother     Heart attack Mother     Deep vein thrombosis Mother     Osteoarthritis Mother     Stroke Father     Heart disease Father     Hypertension Father     Heart attack Father     Cancer Other     Diabetes Other     Heart disease Other     Hypertension Other     Heart disease Other     Hypertension Other     Heart attack Other        Social History     Socioeconomic History    Marital status:    Tobacco Use    Smoking status: Never    Smokeless tobacco: Never   Vaping Use    Vaping Use: Never used   Substance and Sexual Activity    Alcohol use: No    Drug use: No    Sexual activity: Defer         Allergies:  Allergies   Allergen Reactions    Erythromycin Hives    Duract [Bromfenac] Hives    Duricef [Cefadroxil] Hives    Lamisil [Terbinafine] Other (See Comments)     \"IT CAUSED chemically induced LUPUS\"    Lasix [Furosemide] Other (See Comments)     PT STATES \"IM JUST VERY SENSITIVE TO IT.\"    Mevacor [Lovastatin] Other (See Comments)     PT REPORTS \"IT JUST MADE ME FEEL SICK, I COULDN'T TOLERATE IT NOTHING SPECIFIC.\"    Sulfa Antibiotics Hives    Sulfanilamide     Zocor [Simvastatin] Other (See Comments)     \"IT JUST MADE ME FEEL SICK, I COULDN'T TOLERATE " "IT. NOTHING SPECIFIC.\"         Home Medications:  No current facility-administered medications on file prior to encounter.     Current Outpatient Medications on File Prior to Encounter   Medication Sig Dispense Refill    acetaminophen (TYLENOL) 650 MG 8 hr tablet Take 1 tablet by mouth 2 (Two) Times a Day.      Rivaroxaban (XARELTO) 2.5 MG tablet Take 1 tablet by mouth Every Night.      acetaminophen (TYLENOL) 500 MG tablet Take 1 tablet by mouth Daily.      AMLODIPINE BESYLATE PO Take 10 mg by mouth Daily.      Bumetanide (BUMEX PO) Take 2 mg by mouth Daily.      docusate sodium 100 MG capsule Take 1 capsule by mouth 2 (Two) Times a Day. (Patient not taking: Reported on 7/20/2023) 60 each 0    famotidine (PEPCID) 20 MG tablet Take 20 mg by mouth Daily. (Patient not taking: Reported on 7/20/2023)      febuxostat (ULORIC) 40 MG tablet Take 1 tablet by mouth Daily.      fenofibrate micronized (LOFIBRA) 134 MG capsule Take 134 mg by mouth Daily. (Patient not taking: Reported on 7/20/2023)      hydroxychloroquine (PLAQUENIL) 200 MG tablet Take 200 mg by mouth 2 (Two) Times a Day. (Patient not taking: Reported on 7/20/2023)      nebivolol (BYSTOLIC) 10 MG tablet Take 1 tablet by mouth Daily.      oxybutynin XL (DITROPAN-XL) 5 MG 24 hr tablet Take 1 tablet by mouth Daily.      pregabalin (LYRICA) 100 MG capsule Take 1 capsule by mouth 2 (Two) Times a Day.      rosuvastatin (CRESTOR) 5 MG tablet Take 5 mg by mouth Daily. (Patient not taking: Reported on 7/20/2023)      sertraline (ZOLOFT) 100 MG tablet Take 1 tablet by mouth Daily.         Current Hospital Medications:  Current Facility-Administered Medications   Medication Dose Route Frequency Provider Last Rate Last Admin    acetaminophen (TYLENOL) tablet 650 mg  650 mg Oral Q4H PRN Amanda Kirby MD        sennosides-docusate (PERICOLACE) 8.6-50 MG per tablet 2 tablet  2 tablet Oral BID Amanda Kirby MD        And    polyethylene glycol (MIRALAX) packet 17 g  17 g Oral " Daily PRN Amanda Kirby MD        And    bisacodyl (DULCOLAX) EC tablet 5 mg  5 mg Oral Daily PRN Amanda Kirby MD        And    bisacodyl (DULCOLAX) suppository 10 mg  10 mg Rectal Daily PRN Amanda Kirby MD        Calcium Replacement - Follow Nurse / BPA Driven Protocol   Does not apply PRN Amanda Kirby MD        ceFAZolin in dextrose (ANCEF) IVPB solution 2,000 mg  2,000 mg Intravenous Q8H Durga Garcia MD        dextrose (D50W) (25 g/50 mL) IV injection 25 g  25 g Intravenous Q15 Min PRN Amanda Kirby MD        dextrose (GLUTOSE) oral gel 15 g  15 g Oral Q15 Min PRN Amanda Kirby MD        glucagon (GLUCAGEN) injection 1 mg  1 mg Intramuscular Q15 Min PRN Amanda Kirby MD        heparin (porcine) 5000 UNIT/ML injection 5,000 Units  5,000 Units Subcutaneous Q12H Amanda Kirby MD   5,000 Units at 07/21/23 0942    insulin regular (humuLIN R,novoLIN R) injection 2-7 Units  2-7 Units Subcutaneous Q6H Amanda Kirby MD        lactated ringers infusion  100 mL/hr Intravenous Continuous Amanda Kirby  mL/hr at 07/21/23 0518 100 mL/hr at 07/21/23 0518    Magnesium Standard Dose Replacement - Follow Nurse / BPA Driven Protocol   Does not apply PRN Amanda Kirby MD        metroNIDAZOLE (FLAGYL) IVPB 500 mg  500 mg Intravenous Q8H Amanda Kirby  mL/hr at 07/21/23 0507 500 mg at 07/21/23 0507    morphine injection 1 mg  1 mg Intravenous Q4H PRN Amanda Kirby MD        And    naloxone (NARCAN) injection 0.4 mg  0.4 mg Intravenous Q5 Min PRN Amanda Kirby MD        nebivolol (BYSTOLIC) tablet 10 mg  10 mg Oral Daily Amanda Kirby MD        Phosphorus Replacement - Follow Nurse / BPA Driven Protocol   Does not apply PRN Amanda Kirby MD        Potassium Replacement - Follow Nurse / BPA Driven Protocol   Does not apply PRN Amanda Kirby MD        rosuvastatin (CRESTOR) tablet 5 mg  5 mg Oral Nightly Amanda Kirby MD        sertraline (ZOLOFT) tablet 100 mg  100 mg Oral Daily Amanda Kirby MD         sodium chloride 0.9 % flush 10 mL  10 mL Intravenous Q12H Amanda Kirby MD   10 mL at 23    sodium chloride 0.9 % flush 10 mL  10 mL Intravenous PRN Amanda Kirby MD        sodium chloride 0.9 % infusion 40 mL  40 mL Intravenous PRN Amanda Kirby MD           Antimicrobials:  Anti-Infectives (From admission, onward)      Ordered     Dose/Rate Route Frequency Start Stop    23 0851  ceFAZolin in dextrose (ANCEF) IVPB solution 2,000 mg        Ordering Provider: Durga Garcia MD    2,000 mg  over 30 Minutes Intravenous Every 8 Hours 23 0900 23 0859    23 1411  metroNIDAZOLE (FLAGYL) IVPB 500 mg        Ordering Provider: Amanda Kirby MD    500 mg  100 mL/hr over 60 Minutes Intravenous Every 8 Hours 23 2200 23 2159    23 1203  aztreonam (AZACTAM) 2000 mg/100 mL 0.9% NS (mbp)        Ordering Provider: Hubert Gaston DO    2 g  over 30 Minutes Intravenous Once 23 1219 23 1308    23 1203  metroNIDAZOLE (FLAGYL) IVPB 500 mg        Ordering Provider: Hubert Gaston DO    500 mg  100 mL/hr over 60 Minutes Intravenous Once 23 1219 23 1419            Review of Systems:   Constitutional: Subjective fever.  Poor appetite.  Positive fatigue.  HEENT:  No new vision, hearing or throat complaints.  No oral sores.  No headache, photophobia or neck stiffness.  CV:  No chest pain, palpitations, or syncope.  Respiratory: Positive cough.  GI: Positive nausea, vomiting, and diarrhea.  Positive abdominal distention  :  No dysuria, hematuria, urgency, or flank pain.  Lymph:  No swollen lymph nodes in neck, axilla, or groin.   Hematologic:  No easy bruising or bleeding.  Endo:  + diabetes.   Musculoskeletal:  No new swelling or pain of joints.  No new back pain.  Neurologic:  No acute focal weakness or numbness.  No seizures.  Skin:  No rashes, ulcers, or lesions.       Vital Signs:  Temp (24hrs), Av.1 °F (36.7 °C), Min:97.2 °F (36.2 °C), Max:98.7 °F  "(37.1 °C)    /52 (BP Location: Left arm, Patient Position: Lying)   Pulse 73   Temp 98.7 °F (37.1 °C) (Oral)   Resp 18   Ht 167.6 cm (66\")   Wt 82.7 kg (182 lb 6.4 oz)   SpO2 93%   BMI 29.44 kg/m²     Physical Examination:  GENERAL: Acutely on chronically-appearing female.  Awake and alert, in no acute distress.   HEENT: Normocephalic, atraumatic.  EOMI. No conjunctival injection or subconjunctival hemorrhage. No icterus. NGT in place.  NECK: Supple without nuchal rigidity.  No thyromegaly.  CV: RRR. No murmur, rubs, gallops.  Normal S1S2.  LUNGS:  Faint bilateral rhonchi. Normal respiratory effort.  ABDOMEN: Hyperactive bowel sounds.  Soft, mild diffuse TTP, mildly distended.  EXT:  No clubbing, cyanosis, or edema.    MSK: No joint effusions or inflammation noted.  S/p left TKA, no inflammation.  SKIN: Warm and dry without rash or ulcer.  NEURO: A&Ox4. No focal deficits.  Face symmetric.  Speech fluent.  Moves all extremities well.   PSYCHIATRIC: Normal insight and judgement.  Cooperative.  Normal affect.      Laboratory Data:    Results from last 7 days   Lab Units 07/21/23  0354 07/20/23  0843   WBC 10*3/mm3 6.85 16.30*   HEMOGLOBIN g/dL 11.2* 14.8   HEMATOCRIT % 33.7* 44.2   PLATELETS 10*3/mm3 191 294     Results from last 7 days   Lab Units 07/21/23  0354   SODIUM mmol/L 144   POTASSIUM mmol/L 3.6   CHLORIDE mmol/L 105   CO2 mmol/L 29.0   BUN mg/dL 64*   CREATININE mg/dL 1.36*   GLUCOSE mg/dL 100*   CALCIUM mg/dL 9.9     Results from last 7 days   Lab Units 07/20/23  0843   ALK PHOS U/L 74   BILIRUBIN mg/dL 0.6   ALT (SGPT) U/L 17   AST (SGOT) U/L 23             Estimated Creatinine Clearance: 40 mL/min (A) (by C-G formula based on SCr of 1.36 mg/dL (H)).  Results from last 7 days   Lab Units 07/20/23  1125   LACTATE mmol/L 1.4                     Microbiology:  Microbiology Results (last 10 days)       Procedure Component Value - Date/Time    Blood Culture - Blood, Arm, Left [[id number]]  " (Abnormal) Collected: 07/20/23 1226    Lab Status: Preliminary result Specimen: Blood from Arm, Left Updated: 07/21/23 0249     Blood Culture Abnormal Stain     Gram Stain Aerobic Bottle Gram positive cocci in groups      Anaerobic Bottle Gram positive cocci in groups    Blood Culture ID, PCR - Blood, Arm, Left [[id number]]  (Abnormal) Collected: 07/20/23 1226    Lab Status: Final result Specimen: Blood from Arm, Left Updated: 07/21/23 0246     BCID, PCR Staph aureus. mecA/C and MREJ (methicillin resistance gene) NOT detected. Identification by BCID2 PCR    Narrative:      Infectious disease consultation is highly recommended to rule out distant foci of infection.    Blood Culture - Blood, Arm, Right [[id number]]  (Normal) Collected: 07/20/23 1220    Lab Status: Preliminary result Specimen: Blood from Arm, Right Updated: 07/21/23 1245     Blood Culture No growth at 24 hours                Radiology:  Imaging Results (Last 72 Hours)       Procedure Component Value Units Date/Time    XR Abdomen KUB [[id number]] Collected: 07/20/23 1330     Updated: 07/20/23 1334    Narrative:      XR ABDOMEN KUB    Date of Exam: 7/20/2023 1:15 PM EDT    Indication: NG placement    Comparison: None available.    Findings:  NG tube within the stomach. There is mild bibasilar opacities either atelectasis or pneumonia. There are mildly dilated loops of bowel within the visualized abdomen.      Impression:      Impression:    1. NG tube within the stomach.      Electronically Signed: Kofi Phelps    7/20/2023 1:31 PM EDT    Workstation ID: [id number]    CT Chest Without Contrast Diagnostic [[id number]] Collected: 07/20/23 1054     Updated: 07/20/23 1100    Narrative:      CT CHEST WO CONTRAST DIAGNOSTIC    Date of Exam: 7/20/2023 10:41 AM EDT    Indication: abd pain, hypoxia.    Comparison: None available.    Technique: Axial CT images were obtained of the chest without contrast administration.  Reconstructed coronal and sagittal images were  also obtained. Automated exposure control and iterative construction methods were used.      Findings:  There is no pathologic axillary adenopathy or other worrisome body wall soft tissue finding in the chest. There is no pleural or pericardial effusion. Atherosclerotic, nonaneurysmal thoracic aorta. Small, likely reactive right greater than left hilar   lymph nodes are present, incompletely characterized on this noncontrast exam. Evaluation of the osseous structures demonstrates no evidence of acute fracture or aggressive osseous lesion, with multilevel spondylosis change present. The lung fields and   straight multifocal areas of peribronchial consolidation and groundglass opacity, most notable within the right middle lobe and right lower lobe.      Impression:      Impression:  Areas of parabronchial consolidation and groundglass opacity are present within the right middle lobe and right lower lobe, with minimal left lung base involvement, most consistent with multifocal pneumonia.        Electronically Signed: Tyrone Ceja    7/20/2023 10:57 AM EDT    Workstation ID: [id number]    CT Abdomen Pelvis Without Contrast [[id number]] Collected: 07/20/23 1050     Updated: 07/20/23 1058    Narrative:      CT ABDOMEN PELVIS WO CONTRAST    Date of Exam: 7/20/2023 10:41 AM EDT    Indication: abd pain, n/v.    Comparison: 5/30/2023    Technique: Axial CT images were obtained of the abdomen and pelvis without the administration of contrast. Reconstructed coronal and sagittal images were also obtained. Automated exposure control and iterative construction methods were used.      Findings:  There is new incompletely imaged fairly extensive infiltrate of the right middle and right lower lobes. There are no pleural effusions. The stomach is markedly dilated and contains fluid, debris, and air. The stomach was only mildly distended on the   prior exam. There are cholecystectomy clips. The pancreas and right adrenal gland appear  normal. Small left adrenal lesion is compatible with an adenoma. There are no renal stones or hydronephrosis. 2 small hypodense left renal lesions likely represent   proteinaceous or milk of calcium cysts. There is new moderately severe dilatation of small bowel loops to the level of an anterior abdominal wall hernia which contains dilated small bowel and peritoneal fat. The small bowel distal to the hernia is   decompressed. There are multiple surgical clips and sutures in the right lower quadrant. The colon is normal in size. There is diverticulosis without acute diverticulitis. There is a second low right anterior abdominal wall hernia containing nondilated   small bowel.          Impression:      Impression:  Findings are compatible with small bowel obstruction, caused by lower anterior abdominal wall hernia. Gross gastric distention.    Second hernia is present containing nonobstructed small bowel.    Incompletely imaged right middle and right lower lobe lung infiltrates are suspicious for pneumonia.            Electronically Signed: Lissett Rhodes MD    7/20/2023 10:55 AM EDT    Workstation ID: [id number]            I have personally reviewed the radiology images.        IMPRESSION:  73 y.o. female with history of large abdominal wall hernia admitted with small bowel obstruction.   Also noted to have patchy bilateral pulmonary infiltrates and leukocytosis.  Blood cultures with 1 set positive for MSSA.  Indwelling hardware of the left knee and lumbar spine.  No increased inflammation at the sites.    PROBLEM LIST:   -- MSSA bacteremia, unclear source.  Possibly pneumonia.  -- Leukocytosis, resolved.  -- Patchy bilateral pulmonary opacities in setting of recent vomiting.  Consider aspiration.  -- Small bowel obstruction in setting of large abdominal wall hernia.  -- Acute kidney injury, improved.  -- History of left knee arthroplasty, no inflammation at the site.  History of lumbar spinal hardware, no increased  pain at the site.    PLAN:  -- Agree with cefazolin 2 g IV every 8 hours  -- Discontinue metronidazole  -- Proceed with transthoracic echocardiogram to assess for vegetation  -- Will repeat blood cultures x2 tomorrow to assess for clearing  -- Assess for adverse drug reactions from antimicrobials.  -- Follow vitals, exam, labs, and cultures.  -- Follow results of pending culture data and tailor antibiotics as appropriate.  -- Final plans pending clinical course.  Will likely need PICC and at least 4 weeks IV abx for complicated Staph aureus bacteremia.    Complex case.  Thank you for the consultation.  I will continue to follow along with you.  Plan discussed with patient.    Durga Mckay MD  7/21/2023  16:25 EDT

## 2023-07-21 NOTE — PLAN OF CARE
Goal Outcome Evaluation:  Plan of Care Reviewed With: patient, spouse           Outcome Evaluation: OT eval completed. Pt presents with generalized weakness, decreased standing tolerance, and impaired balance impacting ADL's. Pt SUP for bed mobility, Noe for functional transfers, SEXTON for grooming tasks seated. IP OT services warranted. Recommend discharge home with assist and HHOT.      Anticipated Discharge Disposition (OT): home with assist, home with home health

## 2023-07-21 NOTE — PLAN OF CARE
Goal Outcome Evaluation:  Plan of Care Reviewed With: patient        Progress: no change  Outcome Evaluation: PT eval completed. Patient presents below baseline for functional mobility. Patient demonstrates decreased activity tolerance, deconditioning and reduced dynamic balance. Patient would continue to benefit from skilled PT services to improve dynamic balance with gait, transfers strength, and activity tolerance training in order to build endurance and reduce risk of falls.      Anticipated Discharge Disposition (PT): home with home health

## 2023-07-21 NOTE — THERAPY EVALUATION
Patient Name: Lissett Lowry  : 1950    MRN: [id number]                              Today's Date: 2023       Admit Date: 2023    Visit Dx:     ICD-10-CM ICD-9-CM   1. SBO (small bowel obstruction)  K56.609 560.9   2. Nausea and vomiting, unspecified vomiting type  R11.2 787.01   3. Pneumonia of right lower lobe due to infectious organism  J18.9 486   4. KARLA (acute kidney injury)  N17.9 584.9     Patient Active Problem List   Diagnosis    Obesity    Controlled type 2 diabetes mellitus without complication, with long-term current use of insulin    Osteoporosis    Gout    Right leg pain    Right foot pain    Chronic right SI joint pain    Chronic bilateral low back pain without sciatica    BMI 45.0-49.9, adult    Primary osteoarthritis of left knee    Diabetes mellitus    DDD (degenerative disc disease), lumbar    Retrolisthesis of vertebrae    Physical deconditioning    Arthritis of left knee    Status post total left knee replacement    HTN (hypertension)    HLD (hyperlipidemia)    PHYLLIS on CPAP    CKD (chronic kidney disease)    Acute blood loss anemia, mild, asymptomatic    SBO (small bowel obstruction)    Sjogren syndrome, unspecified     Past Medical History:   Diagnosis Date    Anemia     Chronic kidney disease, stage 3     Chronic pain in left shoulder     Depression     Diabetes mellitus     Disease of thyroid gland     as a child- underactive     Gout     Heart disease     History of Clostridium difficile infection     History of kidney stones     Hyperlipidemia     Hypertension     IBS (irritable bowel syndrome)     Kidney stone     Obesity     PHYLLIS on CPAP     Set to 10     Osteoarthritis of right elbow     Osteoporosis     Primary osteoarthritis of left knee     Rotator cuff tear     Sjogren's syndrome     SLE (systemic lupus erythematosus)     in remission    SOB (shortness of breath)      Past Surgical History:   Procedure Laterality Date    APPENDECTOMY      CARDIAC CATHETERIZATION       no stents    CATARACT EXTRACTION      bilateral    CHOLECYSTECTOMY      COLONOSCOPY  2017    ENDOSCOPY      HYSTERECTOMY      OTHER SURGICAL HISTORY      Vitrectomy    TOTAL KNEE ARTHROPLASTY Left 8/26/2019    Procedure: TOTAL KNEE ARTHROPLASTY LEFT;  Surgeon: Rony Rojas MD;  Location: [facility] OR;  Service: Orthopedics      General Information       Row Name 07/21/23 1053          Physical Therapy Time and Intention    Document Type evaluation  -KW     Mode of Treatment individual therapy;physical therapy  -KW       Row Name 07/21/23 1053          General Information    Patient Profile Reviewed yes  -KW     Prior Level of Function independent:;all household mobility;community mobility;gait;transfer;bed mobility  -KW     Barriers to Rehab medically complex  -KW       Row Name 07/21/23 1053          Living Environment    People in Home spouse  -KW       Row Name 07/21/23 1053          Home Main Entrance    Number of Stairs, Main Entrance none  -KW       Row Name 07/21/23 1053          Stairs Within Home, Primary    Number of Stairs, Within Home, Primary none  -KW       Row Name 07/21/23 1053          Cognition    Orientation Status (Cognition) oriented x 4  -KW       Row Name 07/21/23 1053          Safety Issues, Functional Mobility    Impairments Affecting Function (Mobility) balance;endurance/activity tolerance;shortness of breath;strength  -KW               User Key  (r) = Recorded By, (t) = Taken By, (c) = Cosigned By      Initials Name Provider Type    Sherrie Winn PT Physical Therapist                   Mobility       Row Name 07/21/23 1053          Bed Mobility    Bed Mobility supine-sit-supine  -KW     Supine-Sit-Supine [place] (Bed Mobility) supervision  -               User Key  (r) = Recorded By, (t) = Taken By, (c) = Cosigned By      Initials Name Provider Type    Sherrie Winn PT Physical Therapist                   Obj/Interventions       Row Name 07/21/23 1053           Balance    Balance Assessment sitting static balance;sitting dynamic balance;sit to stand dynamic balance;standing static balance;standing dynamic balance  -KW     Static Sitting Balance independent  -KW     Dynamic Sitting Balance supervision  -KW     Position, Sitting Balance unsupported;sitting edge of bed  -KW     Sit to Stand Dynamic Balance contact guard  -KW     Dynamic Standing Balance contact guard  -KW     Position/Device Used, Standing Balance unsupported  -KW               User Key  (r) = Recorded By, (t) = Taken By, (c) = Cosigned By      Initials Name Provider Type    KW Sherrie Munoz PT Physical Therapist                   Goals/Plan       Row Name 07/21/23 1053          Bed Mobility Goal 1 (PT)    Activity/Assistive Device (Bed Mobility Goal 1, PT) sit to supine/supine to sit  -KW     [place] Level/Cues Needed (Bed Mobility Goal 1, PT) independent  -KW     Time Frame (Bed Mobility Goal 1, PT) 10 days  -KW       Row Name 07/21/23 1053          Transfer Goal 1 (PT)    Activity/Assistive Device (Transfer Goal 1, PT) sit-to-stand/stand-to-sit;bed-to-chair/chair-to-bed  -KW     [place] Level/Cues Needed (Transfer Goal 1, PT) independent  -KW     Time Frame (Transfer Goal 1, PT) 10 days  -KW       Row Name 07/21/23 1053          Gait Training Goal 1 (PT)    Activity/Assistive Device (Gait Training Goal 1, PT) gait (walking locomotion);decrease fall risk;diminish gait deviation;improve balance and speed;increase endurance/gait distance  -KW     [place] Level (Gait Training Goal 1, PT) modified independence  -KW     Distance (Gait Training Goal 1, PT) 300  -KW     Time Frame (Gait Training Goal 1, PT) 10 days  -KW               User Key  (r) = Recorded By, (t) = Taken By, (c) = Cosigned By      Initials Name Provider Type    Sherrie Winn PT Physical Therapist                   Clinical Impression       Row Name 07/21/23 1053          Pain    Pretreatment Pain Rating 0/10  - no pain  -KW       Row Name 07/21/23 1053          Plan of Care Review    Plan of Care Reviewed With patient  -KW     Progress no change  -KW     Outcome Evaluation PT eval completed. Patient presents below baseline for functional mobility. Patient demonstrates decreased activity tolerance, deconditioning and reduced dynamic balance. Patient would continue to benefit from skilled PT services to improve dynamic balance with gait, transfers strength, and activity tolerance training in order to build endurance and reduce risk of falls.  -KW       Row Name 07/21/23 1053          Therapy Assessment/Plan (PT)    Rehab Potential (PT) good, to achieve stated therapy goals  -KW     Criteria for Skilled Interventions Met (PT) yes;skilled treatment is necessary  -KW     Therapy Frequency (PT) daily  -KW       Row Name 07/21/23 1053          Vital Signs    Pre Systolic BP Rehab 114  -KW     Pre Treatment Diastolic BP 52  -KW     Pretreatment Heart Rate (beats/min) 73  -KW     Pre SpO2 (%) 92  -KW       Row Name 07/21/23 1053          Positioning and Restraints    Pre-Treatment Position in bed  -KW     Post Treatment Position bed  -KW     In Bed supine;call light within reach;encouraged to call for assist;with family/caregiver  -KW               User Key  (r) = Recorded By, (t) = Taken By, (c) = Cosigned By      Initials Name Provider Type    KW Sherrie Munoz, PT Physical Therapist                   Outcome Measures       Row Name 07/21/23 1053          How much help from another person do you currently need...    Turning from your back to your side while in flat bed without using bedrails? 3  -KW     Moving from lying on back to sitting on the side of a flat bed without bedrails? 3  -KW     Moving to and from a bed to a chair (including a wheelchair)? 3  -KW     Climbing 3-5 steps with a railing? 3  -KW     To walk in hospital room? 3  -KW       Row Name 07/21/23 1053          Functional Assessment    Outcome Measure  Options AM-PAC 6 Clicks Basic Mobility (PT)  -KW               User Key  (r) = Recorded By, (t) = Taken By, (c) = Cosigned By      Initials Name Provider Type    Sherrie Winn PT Physical Therapist                                 Physical Therapy Education       Title: PT OT SLP Therapies (Not Started)       Topic: Physical Therapy (Not Started)       Point: Mobility training (Not Started)       Learner Progress:  Not documented in this visit.              Point: Home exercise program (Not Started)       Learner Progress:  Not documented in this visit.              Point: Body mechanics (Not Started)       Learner Progress:  Not documented in this visit.              Point: Precautions (Not Started)       Learner Progress:  Not documented in this visit.                                  PT Recommendation and Plan     Plan of Care Reviewed With: patient  Progress: no change  Outcome Evaluation: PT eval completed. Patient presents below baseline for functional mobility. Patient demonstrates decreased activity tolerance, deconditioning and reduced dynamic balance. Patient would continue to benefit from skilled PT services to improve dynamic balance with gait, transfers strength, and activity tolerance training in order to build endurance and reduce risk of falls.     Time Calculation:   PT Evaluation Complexity  History, PT Evaluation Complexity: 3 or more personal factors and/or comorbidities  Examination of Body Systems (PT Eval Complexity): total of 3 or more elements  Clinical Presentation (PT Evaluation Complexity): evolving  Clinical Decision Making (PT Evaluation Complexity): moderate complexity  Overall Complexity (PT Evaluation Complexity): moderate complexity     PT Charges       Row Name 07/21/23 1053             Time Calculation    Start Time 1053  -KW      PT Received On 07/21/23  -KW      PT Goal Re-Cert Due Date 07/31/23  -KW         Untimed Charges    PT Eval/Re-eval Minutes 38  -KW          Total Minutes    Untimed Charges Total Minutes 38  -KW       Total Minutes 38  -KW                User Key  (r) = Recorded By, (t) = Taken By, (c) = Cosigned By      Initials Name Provider Type    Sherrie Winn, SRINIVAS Physical Therapist                  Therapy Charges for Today       Code Description Service Date Service Provider Modifiers Qty    41269701190 HC PT EVAL MOD COMPLEXITY 3 7/21/2023 Sherrie Munoz PT GP 1            PT G-Codes  Outcome Measure Options: AM-PAC 6 Clicks Basic Mobility (PT)  AM-PAC 6 Clicks Score (PT): 16  PT Discharge Summary  Anticipated Discharge Disposition (PT): home with home health    Sherrie Munoz PT  7/21/2023

## 2023-07-21 NOTE — PROGRESS NOTES
[facility] Medicine Services  PROGRESS NOTE    Patient Name: Lissett Lowry  : 1950  MRN: [id number]    Date of Admission: 2023  Primary Care Physician: Konstantin Gibson MD    Subjective   Subjective     CC:  Emesis, abdominal pain    HPI:  Feels better.  S/p explosive BM this AM.  States that breathing ok.  Has h/o ruptured appendix and recurrent C diff.  Reports last episode of c diff was about  6-7 years ago    ROS:  Gen- No fevers, chills  CV- No chest pain, palpitations  Resp- No cough, dyspnea  GI- No N/V/D, abd pain      Objective   Objective     Vital Signs:   Temp:  [97.2 °F (36.2 °C)-98.7 °F (37.1 °C)] 98.7 °F (37.1 °C)  Heart Rate:  [73-85] 73  Resp:  [16-18] 18  BP: (102-115)/(52-66) 113/52  Flow (L/min):  [2-3] 2     Physical Exam:  Constitutional: No acute distress, awake, alert  HENT: NCAT, mucous membranes moist, +NGT to LWS  Respiratory: Clear to auscultation bilaterally, respiratory effort normal   Cardiovascular: RRR, no murmurs, rubs, or gallops  Gastrointestinal: Positive bowel sounds, soft, nontender, nondistended, large reducible left ventral hernia  Musculoskeletal: No bilateral ankle edema  Psychiatric: Appropriate affect, cooperative  Neurologic: Oriented x 3, strength symmetric in all extremities, Cranial Nerves grossly intact to confrontation, speech clear  Skin: No rashes      Results Reviewed:  LAB RESULTS:      Lab 23  0354 23  1125 23  0843   WBC 6.85  --  16.30*   HEMOGLOBIN 11.2*  --  14.8   HEMATOCRIT 33.7*  --  44.2   PLATELETS 191  --  294   NEUTROS ABS  --   --  14.58*   IMMATURE GRANS (ABS)  --   --  0.11*   LYMPHS ABS  --   --  0.83   MONOS ABS  --   --  0.77   EOS ABS  --   --  0.00   MCV 94.7  --  93.6   LACTATE  --  1.4 3.1*         Lab 23  0354 07/20/23  0843   SODIUM 144 142   POTASSIUM 3.6 4.1   CHLORIDE 105 99   CO2 29.0 27.0   ANION GAP 10.0 16.0*   BUN 64* 64*   CREATININE 1.36* 1.94*   EGFR 41.2*  27.1*   GLUCOSE 100* 189*   CALCIUM 9.9 11.5*         Lab 07/20/23  0843   TOTAL PROTEIN 7.7   ALBUMIN 4.4   GLOBULIN 3.3   ALT (SGPT) 17   AST (SGOT) 23   BILIRUBIN 0.6   ALK PHOS 74   LIPASE 15         Lab 07/20/23  1125 07/20/23  0843   HSTROP T 30* 35*                 Brief Urine Lab Results  (Last result in the past 365 days)        Color   Clarity   Blood   Leuk Est   Nitrite   Protein   CREAT   Urine HCG        07/20/23 1209 Dark Yellow   Cloudy   Negative   Moderate (2+)   Negative   Trace                   Microbiology Results Abnormal       None            CT Abdomen Pelvis Without Contrast    Result Date: 7/20/2023  CT ABDOMEN PELVIS WO CONTRAST Date of Exam: 7/20/2023 10:41 AM EDT Indication: abd pain, n/v. Comparison: 5/30/2023 Technique: Axial CT images were obtained of the abdomen and pelvis without the administration of contrast. Reconstructed coronal and sagittal images were also obtained. Automated exposure control and iterative construction methods were used. Findings: There is new incompletely imaged fairly extensive infiltrate of the right middle and right lower lobes. There are no pleural effusions. The stomach is markedly dilated and contains fluid, debris, and air. The stomach was only mildly distended on the prior exam. There are cholecystectomy clips. The pancreas and right adrenal gland appear normal. Small left adrenal lesion is compatible with an adenoma. There are no renal stones or hydronephrosis. 2 small hypodense left renal lesions likely represent proteinaceous or milk of calcium cysts. There is new moderately severe dilatation of small bowel loops to the level of an anterior abdominal wall hernia which contains dilated small bowel and peritoneal fat. The small bowel distal to the hernia is decompressed. There are multiple surgical clips and sutures in the right lower quadrant. The colon is normal in size. There is diverticulosis without acute diverticulitis. There is a second low  right anterior abdominal wall hernia containing nondilated small bowel.     Impression: Impression: Findings are compatible with small bowel obstruction, caused by lower anterior abdominal wall hernia. Gross gastric distention. Second hernia is present containing nonobstructed small bowel. Incompletely imaged right middle and right lower lobe lung infiltrates are suspicious for pneumonia. Electronically Signed: Lissett Rhodes MD  7/20/2023 10:55 AM EDT  Workstation ID: [id number]    CT Chest Without Contrast Diagnostic    Result Date: 7/20/2023  CT CHEST WO CONTRAST DIAGNOSTIC Date of Exam: 7/20/2023 10:41 AM EDT Indication: abd pain, hypoxia. Comparison: None available. Technique: Axial CT images were obtained of the chest without contrast administration.  Reconstructed coronal and sagittal images were also obtained. Automated exposure control and iterative construction methods were used. Findings: There is no pathologic axillary adenopathy or other worrisome body wall soft tissue finding in the chest. There is no pleural or pericardial effusion. Atherosclerotic, nonaneurysmal thoracic aorta. Small, likely reactive right greater than left hilar lymph nodes are present, incompletely characterized on this noncontrast exam. Evaluation of the osseous structures demonstrates no evidence of acute fracture or aggressive osseous lesion, with multilevel spondylosis change present. The lung fields and straight multifocal areas of peribronchial consolidation and groundglass opacity, most notable within the right middle lobe and right lower lobe.     Impression: Impression: Areas of parabronchial consolidation and groundglass opacity are present within the right middle lobe and right lower lobe, with minimal left lung base involvement, most consistent with multifocal pneumonia. Electronically Signed: Tyrone Ceja  7/20/2023 10:57 AM EDT  Workstation ID: [id number]    XR Abdomen KUB    Result Date: 7/20/2023  XR ABDOMEN KUB Date  of Exam: 7/20/2023 1:15 PM EDT Indication: NG placement Comparison: None available. Findings: NG tube within the stomach. There is mild bibasilar opacities either atelectasis or pneumonia. There are mildly dilated loops of bowel within the visualized abdomen.     Impression: Impression: 1. NG tube within the stomach. Electronically Signed: Kofi Phelps  7/20/2023 1:31 PM EDT  Workstation ID: [id number]         Current medications:  Scheduled Meds:ceFAZolin, 2,000 mg, Intravenous, Q8H  heparin (porcine), 5,000 Units, Subcutaneous, Q12H  insulin regular, 2-7 Units, Subcutaneous, Q6H  metroNIDAZOLE, 500 mg, Intravenous, Q8H  nebivolol, 10 mg, Oral, Daily  rosuvastatin, 5 mg, Oral, Nightly  senna-docusate sodium, 2 tablet, Oral, BID  sertraline, 100 mg, Oral, Daily  sodium chloride, 10 mL, Intravenous, Q12H      Continuous Infusions:lactated ringers, 100 mL/hr, Last Rate: 100 mL/hr (07/21/23 0518)      PRN Meds:.  acetaminophen    senna-docusate sodium **AND** polyethylene glycol **AND** bisacodyl **AND** bisacodyl    Calcium Replacement - Follow Nurse / BPA Driven Protocol    dextrose    dextrose    glucagon (human recombinant)    Magnesium Standard Dose Replacement - Follow Nurse / BPA Driven Protocol    Morphine **AND** naloxone    Phosphorus Replacement - Follow Nurse / BPA Driven Protocol    Potassium Replacement - Follow Nurse / BPA Driven Protocol    sodium chloride    sodium chloride    Assessment & Plan   Assessment & Plan     Active Hospital Problems    Diagnosis  POA    **SBO (small bowel obstruction) [K56.609]  Yes    Sjogren syndrome, unspecified [M35.00]  Unknown    HTN (hypertension) [I10]  Yes    HLD (hyperlipidemia) [E78.5]  Yes    PHYLLIS on CPAP [G47.33, Z99.89]  Not Applicable    Controlled type 2 diabetes mellitus without complication, with long-term current use of insulin [E11.9, Z79.4]  Not Applicable      Resolved Hospital Problems   No resolved problems to display.        Brief Hospital Course to  date:  Lissett Lowry is a 73 y.o. female  w h/o large abdominal hernia, undergoing OP w/u for surgery w Dr FAYE, DMII, h/o PE 2021 gary-op who presents after 2+ days of abdominal pain, dark emesis. Found to have SBO 2/2 abdominal wall hernia     SBO 2/2 abdominal wall hernia  -surgery following.  Recs NPO, NGT to LWS, IVF.  Holding xarelto  -large BM this AM, seems to be improving     RLL PNA, likely aspiration in setting of significant vomiting  -Note has hives to cefadroxil which has side chain in common w unasyn + zosyn. Will change to cefazolin per pharmacist  to cover also MSSA in blood culture    MSSA  --change abx to cefazolin.  Follow cultures.  Consult ID    H/o PE 2021 - PE was perioperative in 2021, on low-dose xarelto since. Last dose 7/17 PM. Will hold in case need for surgery, DVT ppx throughout gary-op period given history     KARLA  --improving with IVF  --holding bumex     Chronic bumex use - family denies h/o CHF but on high dose bumex. Hold for now given dehydration.      DMII - hold ozempic. Low-dose NPO SSI  Sjogren - hold HCQ for now  HLD - statin  HTN - continue nebivolol, hold other BP meds  Mood disorder - home sertraline  Multiple listed medication allergies - complicates care, consider allergy referral as OP        Expected Discharge Location and Transportation:   Expected Discharge   Expected Discharge Date: 7/27/2023; Expected Discharge Time:      DVT prophylaxis:  Medical DVT prophylaxis orders are present.     AM-PAC 6 Clicks Score (PT): 16 (07/20/23 2000)    CODE STATUS:   Code Status and Medical Interventions:   Ordered at: 07/20/23 1417     Level Of Support Discussed With:    Patient    Next of Kin (If No Surrogate)     Code Status (Patient has no pulse and is not breathing):    CPR (Attempt to Resuscitate)     Medical Interventions (Patient has pulse or is breathing):    Full Support     Release to patient:    Routine Release       Durga Garcia MD  07/21/23

## 2023-07-22 ENCOUNTER — APPOINTMENT (OUTPATIENT)
Dept: GENERAL RADIOLOGY | Facility: HOSPITAL | Age: 73
DRG: 393 | End: 2023-07-22
Payer: MEDICARE

## 2023-07-22 PROBLEM — E43 SEVERE MALNUTRITION: Status: ACTIVE | Noted: 2023-07-22

## 2023-07-22 LAB
ANION GAP SERPL CALCULATED.3IONS-SCNC: 8 MMOL/L (ref 5–15)
BUN SERPL-MCNC: 27 MG/DL (ref 8–23)
BUN/CREAT SERPL: 40.9 (ref 7–25)
CALCIUM SPEC-SCNC: 9.9 MG/DL (ref 8.6–10.5)
CHLORIDE SERPL-SCNC: 110 MMOL/L (ref 98–107)
CO2 SERPL-SCNC: 26 MMOL/L (ref 22–29)
CREAT SERPL-MCNC: 0.66 MG/DL (ref 0.57–1)
DEPRECATED RDW RBC AUTO: 45.1 FL (ref 37–54)
EGFRCR SERPLBLD CKD-EPI 2021: 92.8 ML/MIN/1.73
ERYTHROCYTE [DISTWIDTH] IN BLOOD BY AUTOMATED COUNT: 12.8 % (ref 12.3–15.4)
GLUCOSE BLDC GLUCOMTR-MCNC: 105 MG/DL (ref 70–130)
GLUCOSE BLDC GLUCOMTR-MCNC: 108 MG/DL (ref 70–130)
GLUCOSE BLDC GLUCOMTR-MCNC: 109 MG/DL (ref 70–130)
GLUCOSE BLDC GLUCOMTR-MCNC: 186 MG/DL (ref 70–130)
GLUCOSE BLDC GLUCOMTR-MCNC: 64 MG/DL (ref 70–130)
GLUCOSE BLDC GLUCOMTR-MCNC: 68 MG/DL (ref 70–130)
GLUCOSE BLDC GLUCOMTR-MCNC: 73 MG/DL (ref 70–130)
GLUCOSE BLDC GLUCOMTR-MCNC: 74 MG/DL (ref 70–130)
GLUCOSE BLDC GLUCOMTR-MCNC: 85 MG/DL (ref 70–130)
GLUCOSE SERPL-MCNC: 79 MG/DL (ref 65–99)
HCT VFR BLD AUTO: 33 % (ref 34–46.6)
HGB BLD-MCNC: 11 G/DL (ref 12–15.9)
MAGNESIUM SERPL-MCNC: 1.7 MG/DL (ref 1.6–2.4)
MCH RBC QN AUTO: 32.1 PG (ref 26.6–33)
MCHC RBC AUTO-ENTMCNC: 33.3 G/DL (ref 31.5–35.7)
MCV RBC AUTO: 96.2 FL (ref 79–97)
PHOSPHATE SERPL-MCNC: 1.4 MG/DL (ref 2.5–4.5)
PHOSPHATE SERPL-MCNC: 1.4 MG/DL (ref 2.5–4.5)
PHOSPHATE SERPL-MCNC: 1.5 MG/DL (ref 2.5–4.5)
PLATELET # BLD AUTO: 149 10*3/MM3 (ref 140–450)
PMV BLD AUTO: 11.3 FL (ref 6–12)
POTASSIUM SERPL-SCNC: 3.6 MMOL/L (ref 3.5–5.2)
POTASSIUM SERPL-SCNC: 3.8 MMOL/L (ref 3.5–5.2)
RBC # BLD AUTO: 3.43 10*6/MM3 (ref 3.77–5.28)
SODIUM SERPL-SCNC: 144 MMOL/L (ref 136–145)
WBC NRBC COR # BLD: 5.24 10*3/MM3 (ref 3.4–10.8)

## 2023-07-22 PROCEDURE — 87040 BLOOD CULTURE FOR BACTERIA: CPT | Performed by: INTERNAL MEDICINE

## 2023-07-22 PROCEDURE — 82948 REAGENT STRIP/BLOOD GLUCOSE: CPT

## 2023-07-22 PROCEDURE — 84100 ASSAY OF PHOSPHORUS: CPT | Performed by: INTERNAL MEDICINE

## 2023-07-22 PROCEDURE — 83735 ASSAY OF MAGNESIUM: CPT | Performed by: INTERNAL MEDICINE

## 2023-07-22 PROCEDURE — 74250 X-RAY XM SM INT 1CNTRST STD: CPT

## 2023-07-22 PROCEDURE — 84132 ASSAY OF SERUM POTASSIUM: CPT | Performed by: INTERNAL MEDICINE

## 2023-07-22 PROCEDURE — 25010000002 CEFAZOLIN IN DEXTROSE 2-4 GM/100ML-% SOLUTION: Performed by: INTERNAL MEDICINE

## 2023-07-22 PROCEDURE — 85027 COMPLETE CBC AUTOMATED: CPT | Performed by: INTERNAL MEDICINE

## 2023-07-22 PROCEDURE — 80048 BASIC METABOLIC PNL TOTAL CA: CPT | Performed by: INTERNAL MEDICINE

## 2023-07-22 PROCEDURE — 25010000002 HEPARIN (PORCINE) PER 1000 UNITS: Performed by: INTERNAL MEDICINE

## 2023-07-22 PROCEDURE — 0 POTASSIUM CHLORIDE 10 MEQ/100ML SOLUTION: Performed by: INTERNAL MEDICINE

## 2023-07-22 RX ORDER — FENTANYL/ROPIVACAINE/NS/PF 2-625MCG/1
15 PLASTIC BAG, INJECTION (ML) EPIDURAL
Status: COMPLETED | OUTPATIENT
Start: 2023-07-22 | End: 2023-07-23

## 2023-07-22 RX ORDER — DEXTROSE MONOHYDRATE, SODIUM CHLORIDE, AND POTASSIUM CHLORIDE 50; 1.49; 4.5 G/1000ML; G/1000ML; G/1000ML
75 INJECTION, SOLUTION INTRAVENOUS CONTINUOUS
Status: DISCONTINUED | OUTPATIENT
Start: 2023-07-22 | End: 2023-07-23

## 2023-07-22 RX ORDER — POTASSIUM CHLORIDE 1.5 G/1.58G
40 POWDER, FOR SOLUTION ORAL EVERY 4 HOURS
Status: DISCONTINUED | OUTPATIENT
Start: 2023-07-22 | End: 2023-07-22

## 2023-07-22 RX ORDER — POTASSIUM CHLORIDE 1.5 G/1.58G
40 POWDER, FOR SOLUTION ORAL EVERY 4 HOURS
Status: DISPENSED | OUTPATIENT
Start: 2023-07-22 | End: 2023-07-22

## 2023-07-22 RX ADMIN — ROSUVASTATIN CALCIUM 5 MG: 10 TABLET, COATED ORAL at 21:03

## 2023-07-22 RX ADMIN — HEPARIN SODIUM 5000 UNITS: 5000 INJECTION, SOLUTION INTRAVENOUS; SUBCUTANEOUS at 08:34

## 2023-07-22 RX ADMIN — POTASSIUM CHLORIDE 10 MEQ: 7.46 INJECTION, SOLUTION INTRAVENOUS at 00:39

## 2023-07-22 RX ADMIN — HEPARIN SODIUM 5000 UNITS: 5000 INJECTION, SOLUTION INTRAVENOUS; SUBCUTANEOUS at 21:03

## 2023-07-22 RX ADMIN — SERTRALINE HYDROCHLORIDE 100 MG: 100 TABLET ORAL at 08:28

## 2023-07-22 RX ADMIN — NEBIVOLOL 10 MG: 10 TABLET ORAL at 08:28

## 2023-07-22 RX ADMIN — POTASSIUM CHLORIDE, DEXTROSE MONOHYDRATE AND SODIUM CHLORIDE 75 ML/HR: 150; 5; 450 INJECTION, SOLUTION INTRAVENOUS at 23:38

## 2023-07-22 RX ADMIN — Medication 10 ML: at 21:04

## 2023-07-22 RX ADMIN — POTASSIUM CHLORIDE 40 MEQ: 1.5 POWDER, FOR SOLUTION ORAL at 18:33

## 2023-07-22 RX ADMIN — Medication 2000 MG: at 00:39

## 2023-07-22 RX ADMIN — POTASSIUM CHLORIDE, DEXTROSE MONOHYDRATE AND SODIUM CHLORIDE 75 ML/HR: 150; 5; 450 INJECTION, SOLUTION INTRAVENOUS at 10:25

## 2023-07-22 RX ADMIN — POTASSIUM & SODIUM PHOSPHATES POWDER PACK 280-160-250 MG 2 PACKET: 280-160-250 PACK at 15:39

## 2023-07-22 RX ADMIN — Medication 2000 MG: at 18:33

## 2023-07-22 RX ADMIN — POTASSIUM PHOSPHATE, MONOBASIC POTASSIUM PHOSPHATE, DIBASIC 15 MMOL: 224; 236 INJECTION, SOLUTION, CONCENTRATE INTRAVENOUS at 23:36

## 2023-07-22 RX ADMIN — DEXTROSE MONOHYDRATE 25 G: 25 INJECTION, SOLUTION INTRAVENOUS at 00:10

## 2023-07-22 RX ADMIN — Medication 2000 MG: at 08:28

## 2023-07-22 NOTE — PROGRESS NOTES
"General Surgery Daily Progress Note    Small bowel follow-through in progress.    Subjective:  Denies abdominal pain, nausea or vomiting    Objective:  /68 (BP Location: Left arm, Patient Position: Lying)   Pulse 67   Temp 98.7 °F (37.1 °C) (Oral)   Resp 19   Ht 167.6 cm (65.98\")   Wt 82.7 kg (182 lb 5.1 oz)   SpO2 95%   BMI 29.44 kg/m²     General Appearance: Minimal distress  Eyes: Anicteric  Neck: Trachea midline   Cardiovascular:  RRR without murmur nor rub  Lungs:  Bilateral respirations unlabored   Abdomen:  Soft, nontender, reducible abdominal wall hernias  Extremities:  No cyanosis or edema   Skin:  No obvious rashes   Neurologic: awake and conversant       CBC:  Results from last 7 days   Lab Units 07/22/23  0822   WBC 10*3/mm3 5.24   HEMOGLOBIN g/dL 11.0*   HEMATOCRIT % 33.0*   PLATELETS 10*3/mm3 149       CMP:  Results from last 7 days   Lab Units 07/22/23  0822 07/21/23  0354 07/20/23  0843   SODIUM mmol/L 144   < > 142   POTASSIUM mmol/L 3.6   < > 4.1   CHLORIDE mmol/L 110*   < > 99   CO2 mmol/L 26.0   < > 27.0   BUN mg/dL 27*   < > 64*   CREATININE mg/dL 0.66   < > 1.94*   CALCIUM mg/dL 9.9   < > 11.5*   BILIRUBIN mg/dL  --   --  0.6   ALK PHOS U/L  --   --  74   ALT (SGPT) U/L  --   --  17   AST (SGOT) U/L  --   --  23   GLUCOSE mg/dL 79   < > 189*    < > = values in this interval not displayed.         Assessment:  Small bowel obstruction  Complex lower abdominal wall hernia  Right lower lobe infiltrates, pneumonia, possible aspiration  Chronic anticoagulation secondary to pulmonary embolism  Malnutrition    Plan:   If her small bowel follow-through is without gross obstruction.  I would proceed with medical optimization prior to hernia repair.  Abdominal binder at all times.  Await small bowel follow-through results.    Tommie Vale MD - 7/22/2023, 09:26 EDT         "

## 2023-07-22 NOTE — PROGRESS NOTES
"  INFECTIOUS DISEASES  INPATIENT PROGRESS NOTE  2023      PATIENT NAME: Lissett Lowry  :  1950  MRN:  [id number]  Date of Admission:  2023      Antimicrobials:  Cefazoin 2 g iv q8h      MAR reviewed.    Chief Complaint   Patient presents with    Vomiting    Constipation       Reason for consultation:  MSSA bacteremia    Interval history:  NGT remains.  Abd less distended.  No fevers.  Feeling overall better.  Had small bowel follow through and passing contrast.     ROS:  No fevers/chills overnight.  No new rashes.  No SOB or chest pain.  Denies side effects from antimicrobials.      Objective:  Temp (24hrs), Av.4 °F (36.9 °C), Min:98 °F (36.7 °C), Max:98.8 °F (37.1 °C)    /78 (BP Location: Left arm, Patient Position: Lying)   Pulse 70   Temp 98 °F (36.7 °C) (Oral)   Resp 20   Ht 167.6 cm (65.98\")   Wt 82.7 kg (182 lb 5.1 oz)   SpO2 95%   BMI 29.44 kg/m²     Physical Examination:  GENERAL: Acutely on chronically-appearing female.  Awake and alert, in no acute distress.   HEENT: Normocephalic, atraumatic.  EOMI. No conjunctival injection or subconjunctival hemorrhage. NGT in place.  NECK: Supple without nuchal rigidity.  N  CV: RRR. No murmur, rubs, gallops.  Normal S1S2.  LUNGS:  Faint bilateral rhonchi, clearing. Normal respiratory effort.  ABDOMEN: +bowel sounds.  Soft, no TTP, less distended.  +hernia.  EXT:  No edema.    MSK: No joint effusions or inflammation noted.  S/p left TKA, no inflammation.  SKIN: Warm and dry without rash or ulcer.  NEURO: A&Ox4. No focal deficits.  Face symmetric.  Speech fluent.  Moves all extremities well.   PSYCHIATRIC: Normal insight and judgement.  Cooperative.  Normal affect.    Laboratory Data:    Results from last 7 days   Lab Units 23  0822 23  0354 23  0843   WBC 10*3/mm3 5.24 6.85 16.30*   HEMOGLOBIN g/dL 11.0* 11.2* 14.8   HEMATOCRIT % 33.0* 33.7* 44.2   PLATELETS 10*3/mm3 149 191 294     Results from last 7 days "   Lab Units 07/22/23  0822   SODIUM mmol/L 144   POTASSIUM mmol/L 3.6   CHLORIDE mmol/L 110*   CO2 mmol/L 26.0   BUN mg/dL 27*   CREATININE mg/dL 0.66   GLUCOSE mg/dL 79   CALCIUM mg/dL 9.9     Results from last 7 days   Lab Units 07/20/23  0843   ALK PHOS U/L 74   BILIRUBIN mg/dL 0.6   ALT (SGPT) U/L 17   AST (SGOT) U/L 23             Estimated Creatinine Clearance: 82.3 mL/min (by C-G formula based on SCr of 0.66 mg/dL).  Results from last 7 days   Lab Units 07/20/23  1125   LACTATE mmol/L 1.4                     Microbiology:    Microbiology Results (last 10 days)       Procedure Component Value - Date/Time    Blood Culture - Blood, Arm, Left [[id number]]  (Abnormal) Collected: 07/20/23 1226    Lab Status: Preliminary result Specimen: Blood from Arm, Left Updated: 07/22/23 0650     Blood Culture Staphylococcus aureus     Comment:   Infectious disease consultation is highly recommended to rule out distant foci of infection.        Isolated from Aerobic and Anaerobic Bottles     Gram Stain Aerobic Bottle Gram positive cocci in groups      Anaerobic Bottle Gram positive cocci in groups    Blood Culture ID, PCR - Blood, Arm, Left [[id number]]  (Abnormal) Collected: 07/20/23 1226    Lab Status: Final result Specimen: Blood from Arm, Left Updated: 07/21/23 0246     BCID, PCR Staph aureus. mecA/C and MREJ (methicillin resistance gene) NOT detected. Identification by BCID2 PCR    Narrative:      Infectious disease consultation is highly recommended to rule out distant foci of infection.    Blood Culture - Blood, Arm, Right [[id number]]  (Normal) Collected: 07/20/23 1220    Lab Status: Preliminary result Specimen: Blood from Arm, Right Updated: 07/22/23 1245     Blood Culture No growth at 2 days              Radiology:  No radiology results for the last day    TTE:    Left ventricular systolic function is normal. Left ventricular ejection fraction appears to be 61 - 65%.    Moderate mitral annular calcification is present.   Cannot exclude a small adherent vegetation on the posterior surface of the mitral valve.  There is no significant mitral valve stenosis or regurgitation    Other cardiac valves appear normal with no evidence of vegetation    Recommend transesophageal echocardiogram for definitive assessment for endocarditis    DISCUSSION:  73 y.o. female with history of large abdominal wall hernia admitted with small bowel obstruction.   Also noted to have patchy bilateral pulmonary infiltrates and leukocytosis.  Blood cultures with 1 set positive for MSSA.  Indwelling hardware of the left knee and lumbar spine.  No increased inflammation at the sites.     PROBLEM LIST:   -- MSSA bacteremia, unclear source.  Possibly pneumonia. TTE with no obvious vegetation but could not r/o on MV; will need JENNIFER.  -- Leukocytosis, resolved.  -- Patchy bilateral pulmonary opacities in setting of recent vomiting.  Consider aspiration.  -- Small bowel obstruction in setting of large abdominal wall hernia.  -- Acute kidney injury, resolved.  -- History of left knee arthroplasty, no inflammation at the site.  History of lumbar spinal hardware, no increased pain at the site.    PLAN:  -- Continue cefazolin 2 g IV every 8 hours  -- Proceed with JENNIFER next week when GI issues improved  -- Repeat blood cultures x2 to assess for clearing  -- Assess for adverse drug reactions from antimicrobials.  -- Follow vitals, exam, labs, and cultures.  -- Follow results of pending culture data and tailor antibiotics as appropriate.  -- Final plans pending clinical course.  Will need PICC and at least 4 weeks IV abx for complicated Staph aureus bacteremia.    Plan discussed with patient and family.    Durga Mckay MD  7/22/2023  16:40 EDT

## 2023-07-22 NOTE — PROGRESS NOTES
[facility]  PROGRESS NOTE    Patient Name: Lissett Lowry  : 1950  MRN: [id number]    Date of Admission: 2023  Primary Care Physician: Konstantin Gibson MD    Subjective   Subjective     CC:  Emesis, abdominal pain    HPI:  Feels good.  Says she's  in her lower abdomen.  Denies N/v.  Breathing ok.     ROS:  Gen- No fevers, chills  CV- No chest pain, palpitations  Resp- No cough, dyspnea  GI- No N/V/D, abd pain      Objective   Objective     Vital Signs:   Temp:  [98 °F (36.7 °C)-98.8 °F (37.1 °C)] 98 °F (36.7 °C)  Heart Rate:  [62-85] 70  Resp:  [17-20] 20  BP: (129-157)/(58-78) 147/78  Flow (L/min):  [2] 2     Physical Exam:  Constitutional: No acute distress, awake, alert  HENT: NCAT, mucous membranes moist,   Respiratory: Clear to auscultation bilaterally, respiratory effort normal   Cardiovascular: RRR, no murmurs, rubs, or gallops  Gastrointestinal: Positive bowel sounds, soft, nontender, nondistended, large reducible left ventral hernia  Musculoskeletal: No bilateral ankle edema  Psychiatric: Appropriate affect, cooperative  Neurologic: Oriented x 3, strength symmetric in all extremities, Cranial Nerves grossly intact to confrontation, speech clear  Skin: No rashes      Results Reviewed:  LAB RESULTS:      Lab 23  0822 23  0354 23  1125 23  0843   WBC 5.24 6.85  --  16.30*   HEMOGLOBIN 11.0* 11.2*  --  14.8   HEMATOCRIT 33.0* 33.7*  --  44.2   PLATELETS 149 191  --  294   NEUTROS ABS  --   --   --  14.58*   IMMATURE GRANS (ABS)  --   --   --  0.11*   LYMPHS ABS  --   --   --  0.83   MONOS ABS  --   --   --  0.77   EOS ABS  --   --   --  0.00   MCV 96.2 94.7  --  93.6   LACTATE  --   --  1.4 3.1*         Lab 23  0822 23  1550 23  0354 23  0843   SODIUM 144  --  144 142   POTASSIUM 3.6 3.5 3.6 4.1   CHLORIDE 110*  --  105 99   CO2 26.0  --  29.0 27.0   ANION GAP 8.0  --  10.0 16.0*   BUN 27*   --  64* 64*   CREATININE 0.66  --  1.36* 1.94*   EGFR 92.8  --  41.2* 27.1*   GLUCOSE 79  --  100* 189*   CALCIUM 9.9  --  9.9 11.5*   MAGNESIUM 1.7  --   --   --    PHOSPHORUS 1.4*  --   --   --          Lab 07/20/23  0843   TOTAL PROTEIN 7.7   ALBUMIN 4.4   GLOBULIN 3.3   ALT (SGPT) 17   AST (SGOT) 23   BILIRUBIN 0.6   ALK PHOS 74   LIPASE 15         Lab 07/20/23  1125 07/20/23  0843   HSTROP T 30* 35*                 Brief Urine Lab Results  (Last result in the past 365 days)        Color   Clarity   Blood   Leuk Est   Nitrite   Protein   CREAT   Urine HCG        07/20/23 1209 Dark Yellow   Cloudy   Negative   Moderate (2+)   Negative   Trace                   Microbiology Results Abnormal       Procedure Component Value - Date/Time    Blood Culture - Blood, Arm, Right [[id number]]  (Normal) Collected: 07/20/23 1220    Lab Status: Preliminary result Specimen: Blood from Arm, Right Updated: 07/22/23 1245     Blood Culture No growth at 2 days            Adult Transthoracic Echo Limited W/ Cont if Necessary Per Protocol    Result Date: 7/21/2023    Left ventricular systolic function is normal. Left ventricular ejection fraction appears to be 61 - 65%.   Moderate mitral annular calcification is present.  Cannot exclude a small adherent vegetation on the posterior surface of the mitral valve.  There is no significant mitral valve stenosis or regurgitation   Other cardiac valves appear normal with no evidence of vegetation   Recommend transesophageal echocardiogram for definitive assessment for endocarditis      Results for orders placed during the hospital encounter of 07/20/23    Adult Transthoracic Echo Limited W/ Cont if Necessary Per Protocol    Interpretation Summary    Left ventricular systolic function is normal. Left ventricular ejection fraction appears to be 61 - 65%.    Moderate mitral annular calcification is present.  Cannot exclude a small adherent vegetation on the posterior surface of the mitral valve.   There is no significant mitral valve stenosis or regurgitation    Other cardiac valves appear normal with no evidence of vegetation    Recommend transesophageal echocardiogram for definitive assessment for endocarditis      Current medications:  Scheduled Meds:ceFAZolin, 2,000 mg, Intravenous, Q8H  heparin (porcine), 5,000 Units, Subcutaneous, Q12H  insulin regular, 2-7 Units, Subcutaneous, Q6H  nebivolol, 10 mg, Oral, Daily  potassium & sodium phosphates, 2 packet, Oral, Once  potassium chloride, 40 mEq, Oral, Q4H  rosuvastatin, 5 mg, Oral, Nightly  senna-docusate sodium, 2 tablet, Oral, BID  sertraline, 100 mg, Oral, Daily  sodium chloride, 10 mL, Intravenous, Q12H      Continuous Infusions:dextrose 5 % and sodium chloride 0.45 % with KCl 20 mEq/L, 75 mL/hr, Last Rate: 75 mL/hr (07/22/23 1025)  lactated ringers, 100 mL/hr, Last Rate: 100 mL/hr (07/21/23 2321)      PRN Meds:.  acetaminophen    senna-docusate sodium **AND** polyethylene glycol **AND** bisacodyl **AND** bisacodyl    Calcium Replacement - Follow Nurse / BPA Driven Protocol    dextrose    dextrose    glucagon (human recombinant)    Magnesium Standard Dose Replacement - Follow Nurse / BPA Driven Protocol    Morphine **AND** naloxone    Phosphorus Replacement - Follow Nurse / BPA Driven Protocol    Potassium Replacement - Follow Nurse / BPA Driven Protocol    sodium chloride    sodium chloride    Assessment & Plan   Assessment & Plan     Active Hospital Problems    Diagnosis  POA    **SBO (small bowel obstruction) [K56.609]  Yes    Sjogren syndrome, unspecified [M35.00]  Unknown    HTN (hypertension) [I10]  Yes    HLD (hyperlipidemia) [E78.5]  Yes    PHYLLIS on CPAP [G47.33, Z99.89]  Not Applicable    Controlled type 2 diabetes mellitus without complication, with long-term current use of insulin [E11.9, Z79.4]  Not Applicable      Resolved Hospital Problems   No resolved problems to display.        Brief Hospital Course to date:  Lissett Lowry is a 73  y.o. female  w h/o large abdominal hernia, undergoing OP w/u for surgery w Dr FAYE, DMII, h/o PE 2021 gary-op who presents after 2+ days of abdominal pain, dark emesis. Found to have SBO 2/2 abdominal wall hernia     SBO 2/2 abdominal wall hernia  -surgery following.  Recs NPO,  IVF.  Holding xarelto  -having BM's.  Dr. Vale plans SBFT.  States that if SBFT without gross obstruction that would rec medical optimization prior to hernia repair.  Recs abdominal binder at all times.      RLL PNA, likely aspiration in setting of significant vomiting  -Note has hives to cefadroxil which has side chain in common w unasyn + zosyn. -Continue cefazolin  -ID following.     MSSA  --ID following.  Recs cefazolin.  Follow cultures.    --echo shows EF 61-65%, CAN NOT Exclude small vegetation on posterior surface of MV.  Will need JENNIFER    H/o PE 2021 - PE was perioperative in 2021, on low-dose xarelto since. Last dose 7/17 PM. Will hold in case need for surgery, DVT ppx throughout gary-op period given history     KARLA  --improving with IVF  --holding bumex     Chronic bumex use - family denies h/o CHF but on high dose bumex. Hold for now given dehydration.      DMII - hold ozempic. Low-dose  SSI  --has been hypoglycemic so added D5 to IVF    Sjogren - hold HCQ for now  HLD - statin  HTN - continue nebivolol, hold other BP meds  Mood disorder - home sertraline  Multiple listed medication allergies - complicates care, consider allergy referral as OP        Expected Discharge Location and Transportation:   Expected Discharge   Expected Discharge Date: 7/27/2023; Expected Discharge Time:      DVT prophylaxis:  Medical DVT prophylaxis orders are present.     AM-PAC 6 Clicks Score (PT): 18 (07/21/23 2000)    CODE STATUS:   Code Status and Medical Interventions:   Ordered at: 07/20/23 5507     Level Of Support Discussed With:    Patient    Next of Kin (If No Surrogate)     Code Status (Patient has no pulse and is not breathing):    CPR  (Attempt to Resuscitate)     Medical Interventions (Patient has pulse or is breathing):    Full Support     Release to patient:    Routine Release       Durga Garcia MD  07/22/23

## 2023-07-23 LAB
ANION GAP SERPL CALCULATED.3IONS-SCNC: 11 MMOL/L (ref 5–15)
BACTERIA SPEC AEROBE CULT: ABNORMAL
BUN SERPL-MCNC: 16 MG/DL (ref 8–23)
BUN/CREAT SERPL: 28.1 (ref 7–25)
CALCIUM SPEC-SCNC: 9.8 MG/DL (ref 8.6–10.5)
CHLORIDE SERPL-SCNC: 114 MMOL/L (ref 98–107)
CO2 SERPL-SCNC: 23 MMOL/L (ref 22–29)
CREAT SERPL-MCNC: 0.57 MG/DL (ref 0.57–1)
EGFRCR SERPLBLD CKD-EPI 2021: 96.1 ML/MIN/1.73
GLUCOSE BLDC GLUCOMTR-MCNC: 107 MG/DL (ref 70–130)
GLUCOSE BLDC GLUCOMTR-MCNC: 112 MG/DL (ref 70–130)
GLUCOSE BLDC GLUCOMTR-MCNC: 112 MG/DL (ref 70–130)
GLUCOSE BLDC GLUCOMTR-MCNC: 120 MG/DL (ref 70–130)
GLUCOSE BLDC GLUCOMTR-MCNC: 88 MG/DL (ref 70–130)
GLUCOSE SERPL-MCNC: 113 MG/DL (ref 65–99)
GRAM STN SPEC: ABNORMAL
GRAM STN SPEC: ABNORMAL
ISOLATED FROM: ABNORMAL
MAGNESIUM SERPL-MCNC: 1.5 MG/DL (ref 1.6–2.4)
PHOSPHATE SERPL-MCNC: 2.3 MG/DL (ref 2.5–4.5)
POTASSIUM SERPL-SCNC: 4.2 MMOL/L (ref 3.5–5.2)
SODIUM SERPL-SCNC: 148 MMOL/L (ref 136–145)

## 2023-07-23 PROCEDURE — 80048 BASIC METABOLIC PNL TOTAL CA: CPT | Performed by: INTERNAL MEDICINE

## 2023-07-23 PROCEDURE — 84100 ASSAY OF PHOSPHORUS: CPT | Performed by: INTERNAL MEDICINE

## 2023-07-23 PROCEDURE — 83735 ASSAY OF MAGNESIUM: CPT | Performed by: INTERNAL MEDICINE

## 2023-07-23 PROCEDURE — 25010000002 CEFAZOLIN IN DEXTROSE 2-4 GM/100ML-% SOLUTION: Performed by: INTERNAL MEDICINE

## 2023-07-23 PROCEDURE — 25010000002 HEPARIN (PORCINE) PER 1000 UNITS: Performed by: INTERNAL MEDICINE

## 2023-07-23 PROCEDURE — 25010000002 MAGNESIUM SULFATE 2 GM/50ML SOLUTION: Performed by: INTERNAL MEDICINE

## 2023-07-23 PROCEDURE — 82948 REAGENT STRIP/BLOOD GLUCOSE: CPT

## 2023-07-23 PROCEDURE — 0 MAGNESIUM SULFATE 4 GM/100ML SOLUTION: Performed by: INTERNAL MEDICINE

## 2023-07-23 RX ORDER — POLYETHYLENE GLYCOL 3350 17 G/17G
17 POWDER, FOR SOLUTION ORAL DAILY
Status: DISCONTINUED | OUTPATIENT
Start: 2023-07-23 | End: 2023-07-25 | Stop reason: HOSPADM

## 2023-07-23 RX ORDER — MAGNESIUM SULFATE HEPTAHYDRATE 40 MG/ML
2 INJECTION, SOLUTION INTRAVENOUS
Status: DISCONTINUED | OUTPATIENT
Start: 2023-07-23 | End: 2023-07-23 | Stop reason: SDUPTHER

## 2023-07-23 RX ORDER — FAMOTIDINE 20 MG/1
20 TABLET, FILM COATED ORAL
Status: DISCONTINUED | OUTPATIENT
Start: 2023-07-23 | End: 2023-07-25 | Stop reason: HOSPADM

## 2023-07-23 RX ORDER — DEXTROSE MONOHYDRATE 50 MG/ML
25 INJECTION, SOLUTION INTRAVENOUS CONTINUOUS
Status: DISCONTINUED | OUTPATIENT
Start: 2023-07-23 | End: 2023-07-25 | Stop reason: HOSPADM

## 2023-07-23 RX ORDER — MAGNESIUM SULFATE HEPTAHYDRATE 40 MG/ML
4 INJECTION, SOLUTION INTRAVENOUS ONCE
Status: COMPLETED | OUTPATIENT
Start: 2023-07-23 | End: 2023-07-23

## 2023-07-23 RX ADMIN — SERTRALINE HYDROCHLORIDE 100 MG: 100 TABLET ORAL at 08:58

## 2023-07-23 RX ADMIN — HEPARIN SODIUM 5000 UNITS: 5000 INJECTION, SOLUTION INTRAVENOUS; SUBCUTANEOUS at 20:23

## 2023-07-23 RX ADMIN — Medication 10 ML: at 20:25

## 2023-07-23 RX ADMIN — HEPARIN SODIUM 5000 UNITS: 5000 INJECTION, SOLUTION INTRAVENOUS; SUBCUTANEOUS at 08:58

## 2023-07-23 RX ADMIN — MAGNESIUM SULFATE HEPTAHYDRATE 2 G: 40 INJECTION, SOLUTION INTRAVENOUS at 06:43

## 2023-07-23 RX ADMIN — ROSUVASTATIN CALCIUM 5 MG: 10 TABLET, COATED ORAL at 20:23

## 2023-07-23 RX ADMIN — DEXTROSE MONOHYDRATE 25 ML/HR: 50 INJECTION, SOLUTION INTRAVENOUS at 18:06

## 2023-07-23 RX ADMIN — FAMOTIDINE 20 MG: 20 TABLET, FILM COATED ORAL at 18:05

## 2023-07-23 RX ADMIN — NEBIVOLOL 10 MG: 10 TABLET ORAL at 08:58

## 2023-07-23 RX ADMIN — Medication 2000 MG: at 09:14

## 2023-07-23 RX ADMIN — POTASSIUM PHOSPHATE, MONOBASIC POTASSIUM PHOSPHATE, DIBASIC 15 MMOL: 224; 236 INJECTION, SOLUTION, CONCENTRATE INTRAVENOUS at 03:04

## 2023-07-23 RX ADMIN — Medication 2000 MG: at 18:05

## 2023-07-23 RX ADMIN — MAGNESIUM SULFATE IN WATER 4 G: 40 INJECTION, SOLUTION INTRAVENOUS at 11:25

## 2023-07-23 RX ADMIN — Medication 2000 MG: at 02:21

## 2023-07-23 NOTE — PROGRESS NOTES
"  INFECTIOUS DISEASES  INPATIENT PROGRESS NOTE  2023      PATIENT NAME: Lissett Lowry  :  1950  MRN:  [id number]  Date of Admission:  2023      Antimicrobials:  Cefazoin 2 g iv q8h      MAR reviewed.    Chief Complaint   Patient presents with    Vomiting    Constipation       Reason for consultation:  MSSA bacteremia    Interval history:  NGT has been removed.  She is tolerating clear liquid diet.  No abdominal distention.  No fevers.  Toller antibiotics without side effects.  Agreeable to proceed with transesophageal echo.  Agreeable for PICC line when her blood cultures are cleared.    ROS:  No fevers/chills overnight.  No new rashes.  No SOB or chest pain.  Denies side effects from antimicrobials.      Objective:  Temp (24hrs), Av.1 °F (36.7 °C), Min:97.8 °F (36.6 °C), Max:98.4 °F (36.9 °C)    /68 (BP Location: Right arm, Patient Position: Lying)   Pulse 59   Temp 98.3 °F (36.8 °C) (Oral)   Resp 18   Ht 167.6 cm (65.98\")   Wt 82.7 kg (182 lb 5.1 oz)   SpO2 95%   BMI 29.44 kg/m²     Physical Examination:  GENERAL: Improved appearing female.  Awake and alert, in no acute distress.   HEENT: Normocephalic, atraumatic.  EOMI. No conjunctival injection or subconjunctival hemorrhage. NGT out.  CV: RRR. No murmur, rubs, gallops.  Normal S1S2.  LUNGS:  Faint bilateral rhonchi, clearing. Normal respiratory effort.  ABDOMEN: +bowel sounds.  Soft, no TTP, less distended.  +hernia.  EXT:  No edema.    MSK: No joint effusions or inflammation noted.  S/p left TKA, no inflammation.  SKIN: Warm and dry without rash or ulcer.  NEURO: A&Ox4. No focal deficits.  Face symmetric.  Speech fluent.  Moves all extremities well.   PSYCHIATRIC: Normal insight and judgement.  Cooperative.  Normal affect.    Laboratory Data:    Results from last 7 days   Lab Units 23  0822 23  0354 23  0843   WBC 10*3/mm3 5.24 6.85 16.30*   HEMOGLOBIN g/dL 11.0* 11.2* 14.8   HEMATOCRIT % 33.0* 33.7* " 44.2   PLATELETS 10*3/mm3 149 191 294       Results from last 7 days   Lab Units 07/23/23  0353   SODIUM mmol/L 148*   POTASSIUM mmol/L 4.2   CHLORIDE mmol/L 114*   CO2 mmol/L 23.0   BUN mg/dL 16   CREATININE mg/dL 0.57   GLUCOSE mg/dL 113*   CALCIUM mg/dL 9.8       Results from last 7 days   Lab Units 07/20/23  0843   ALK PHOS U/L 74   BILIRUBIN mg/dL 0.6   ALT (SGPT) U/L 17   AST (SGOT) U/L 23               Estimated Creatinine Clearance: 95.3 mL/min (by C-G formula based on SCr of 0.57 mg/dL).  Results from last 7 days   Lab Units 07/20/23  1125   LACTATE mmol/L 1.4                       Microbiology:    Microbiology Results (last 10 days)       Procedure Component Value - Date/Time    Blood Culture - Blood, Arm, Left [[id number]]  (Abnormal)  (Susceptibility) Collected: 07/20/23 1226    Lab Status: Final result Specimen: Blood from Arm, Left Updated: 07/23/23 0605     Blood Culture Staphylococcus aureus     Comment:   Infectious disease consultation is highly recommended to rule out distant foci of infection.        Isolated from Aerobic and Anaerobic Bottles     Gram Stain Aerobic Bottle Gram positive cocci in groups      Anaerobic Bottle Gram positive cocci in groups    Susceptibility        Staphylococcus aureus      KENNEDI      Gentamicin Susceptible      Oxacillin Susceptible      Rifampin Susceptible      Vancomycin Susceptible                       Susceptibility Comments       Staphylococcus aureus    This isolate does not demonstrate inducible clindamycin resistance in vitro.                 Blood Culture ID, PCR - Blood, Arm, Left [[id number]]  (Abnormal) Collected: 07/20/23 1226    Lab Status: Final result Specimen: Blood from Arm, Left Updated: 07/21/23 0246     BCID, PCR Staph aureus. mecA/C and MREJ (methicillin resistance gene) NOT detected. Identification by BCID2 PCR    Narrative:      Infectious disease consultation is highly recommended to rule out distant foci of infection.    Blood Culture -  Blood, Arm, Right [[id number]]  (Normal) Collected: 07/20/23 1220    Lab Status: Preliminary result Specimen: Blood from Arm, Right Updated: 07/23/23 1245     Blood Culture No growth at 3 days              Radiology:  FL Small Bowel Follow Through Single-Contrast    Result Date: 7/22/2023  Impression: Somewhat delayed transit of contrast with gas-filled segments of both small and large bowel suggesting ileus. No evidence of high-grade obstruction. Electronically Signed: Tyrone Ceja  7/22/2023 7:34 PM EDT  Workstation ID: [id number]     TTE:    Left ventricular systolic function is normal. Left ventricular ejection fraction appears to be 61 - 65%.    Moderate mitral annular calcification is present.  Cannot exclude a small adherent vegetation on the posterior surface of the mitral valve.  There is no significant mitral valve stenosis or regurgitation    Other cardiac valves appear normal with no evidence of vegetation    Recommend transesophageal echocardiogram for definitive assessment for endocarditis    DISCUSSION:  73 y.o. female with history of large abdominal wall hernia admitted with small bowel obstruction.   Also noted to have patchy bilateral pulmonary infiltrates and leukocytosis.  Blood cultures with 1 set positive for MSSA.  Indwelling hardware of the left knee and lumbar spine.  No increased inflammation at the sites.     PROBLEM LIST:   -- MSSA bacteremia, unclear source.  Possibly pneumonia. TTE with no obvious vegetation but could not r/o on MV; will need JENNIFER.  -- Leukocytosis, resolved.  -- Patchy bilateral pulmonary opacities in setting of recent vomiting.  Consider aspiration.  -- Small bowel obstruction in setting of large abdominal wall hernia.  -- Acute kidney injury, resolved.  -- History of left knee arthroplasty, no inflammation at the site.  History of lumbar spinal hardware, no increased pain at the site.    PLAN:  -- Continue cefazolin 2 g IV every 8 hours  -- Proceed with JENNIFER to better  assess for endocarditis  -- Follow repeat blood cultures x2 to assess for clearing  -- Final plans pending clinical course.  Will need PICC and at least 4 weeks IV abx for complicated Staph aureus bacteremia.  Proceed with PICC after blood cultures have cleared at 72 hours.    Plan discussed with patient and family.    Durga Mckay MD  7/23/2023  12:59 EDT

## 2023-07-23 NOTE — PROGRESS NOTES
"General Surgery Daily Progress Note    Subjective:  No new complaints.  Denies abdominal pain.  Continues to have GI function, diarrhea.    Objective:  /73 (BP Location: Right arm, Patient Position: Lying)   Pulse 65   Temp 98.4 °F (36.9 °C) (Oral)   Resp 18   Ht 167.6 cm (65.98\")   Wt 82.7 kg (182 lb 5.1 oz)   SpO2 95%   BMI 29.44 kg/m²     General Appearance: No apparent distress  Eyes: Anicteric  Neck: Trachea midline   Cardiovascular:  RRR without murmur nor rub  Lungs:  Bilateral respirations unlabored   Abdomen:  Soft, nontender, reducible ventral hernias   Extremities:  No cyanosis or edema   Skin:  No obvious rashes   Neurologic: awake and conversant       Imaging Results (Last 24 Hours)       Procedure Component Value Units Date/Time    FL Small Bowel Follow Through Single-Contrast [[id number]] Collected: 07/22/23 1933     Updated: 07/22/23 1937    Narrative:      FL SMALL BOWEL FOLLOW THROUGH SINGLE-CONTRAST    Date of Exam: 7/22/2023 10:00 AM EDT    Indication: sbo.    Comparison: None available.    Technique:   image of the abdomen was obtained. Patient ingested medium density barium for single contrast imaging of the small bowel.  Examination was recorded with multiple large field of view radiographs and spot fluoroscopic images.    Findings:  Initial  view of the abdomen demonstrates gas-filled segments of both small and large bowel, without evidence of obstruction. A nasogastric tube is noted in the stomach. There is no overt pneumoperitoneum. Water-soluble contrast was subsequently   administered via the nasogastric tube. Subsequent 30-minute, 2-hour and 4-hour images demonstrate distal progression of contrast, with right colon visualized on the 4-hour image. There is no extravasation of contrast.      Impression:      Impression:  Somewhat delayed transit of contrast with gas-filled segments of both small and large bowel suggesting ileus. No evidence of high-grade " obstruction.      Electronically Signed: Tyrone Ceja    7/22/2023 7:34 PM EDT    Workstation ID: [id number]            CBC:  Results from last 7 days   Lab Units 07/22/23  0822   WBC 10*3/mm3 5.24   HEMOGLOBIN g/dL 11.0*   HEMATOCRIT % 33.0*   PLATELETS 10*3/mm3 149       CMP:  Results from last 7 days   Lab Units 07/23/23  0353 07/21/23  0354 07/20/23  0843   SODIUM mmol/L 148*   < > 142   POTASSIUM mmol/L 4.2   < > 4.1   CHLORIDE mmol/L 114*   < > 99   CO2 mmol/L 23.0   < > 27.0   BUN mg/dL 16   < > 64*   CREATININE mg/dL 0.57   < > 1.94*   CALCIUM mg/dL 9.8   < > 11.5*   BILIRUBIN mg/dL  --   --  0.6   ALK PHOS U/L  --   --  74   ALT (SGPT) U/L  --   --  17   AST (SGOT) U/L  --   --  23   GLUCOSE mg/dL 113*   < > 189*    < > = values in this interval not displayed.         Assessment:  Small bowel obstruction resolving  Complex ventral hernia, reducible    Plan:   DC NG.  Clear liquid diet.  Add clear liquid supplements.  The patient is to wear the abdominal binder at all times, though this is never on when I examined the patient.  Mobilize.    Tmomie Vale MD - 7/23/2023, 10:31 EDT

## 2023-07-24 LAB
ANION GAP SERPL CALCULATED.3IONS-SCNC: 9 MMOL/L (ref 5–15)
BUN SERPL-MCNC: 8 MG/DL (ref 8–23)
BUN/CREAT SERPL: 13.6 (ref 7–25)
CALCIUM SPEC-SCNC: 9.5 MG/DL (ref 8.6–10.5)
CHLORIDE SERPL-SCNC: 108 MMOL/L (ref 98–107)
CO2 SERPL-SCNC: 24 MMOL/L (ref 22–29)
CREAT SERPL-MCNC: 0.59 MG/DL (ref 0.57–1)
EGFRCR SERPLBLD CKD-EPI 2021: 95.3 ML/MIN/1.73
GLUCOSE BLDC GLUCOMTR-MCNC: 102 MG/DL (ref 70–130)
GLUCOSE BLDC GLUCOMTR-MCNC: 102 MG/DL (ref 70–130)
GLUCOSE BLDC GLUCOMTR-MCNC: 109 MG/DL (ref 70–130)
GLUCOSE BLDC GLUCOMTR-MCNC: 77 MG/DL (ref 70–130)
GLUCOSE BLDC GLUCOMTR-MCNC: 84 MG/DL (ref 70–130)
GLUCOSE SERPL-MCNC: 105 MG/DL (ref 65–99)
MAGNESIUM SERPL-MCNC: 2.3 MG/DL (ref 1.6–2.4)
POTASSIUM SERPL-SCNC: 3.9 MMOL/L (ref 3.5–5.2)
SODIUM SERPL-SCNC: 141 MMOL/L (ref 136–145)

## 2023-07-24 PROCEDURE — 83735 ASSAY OF MAGNESIUM: CPT | Performed by: INTERNAL MEDICINE

## 2023-07-24 PROCEDURE — 82948 REAGENT STRIP/BLOOD GLUCOSE: CPT

## 2023-07-24 PROCEDURE — 80048 BASIC METABOLIC PNL TOTAL CA: CPT | Performed by: INTERNAL MEDICINE

## 2023-07-24 PROCEDURE — 25010000002 CEFAZOLIN IN DEXTROSE 2-4 GM/100ML-% SOLUTION: Performed by: INTERNAL MEDICINE

## 2023-07-24 PROCEDURE — 25010000002 HEPARIN (PORCINE) PER 1000 UNITS: Performed by: INTERNAL MEDICINE

## 2023-07-24 RX ADMIN — NEBIVOLOL 10 MG: 10 TABLET ORAL at 08:47

## 2023-07-24 RX ADMIN — ROSUVASTATIN CALCIUM 5 MG: 10 TABLET, COATED ORAL at 20:28

## 2023-07-24 RX ADMIN — SERTRALINE HYDROCHLORIDE 100 MG: 100 TABLET ORAL at 08:45

## 2023-07-24 RX ADMIN — Medication 10 ML: at 20:33

## 2023-07-24 RX ADMIN — Medication 2000 MG: at 10:26

## 2023-07-24 RX ADMIN — FAMOTIDINE 20 MG: 20 TABLET, FILM COATED ORAL at 18:25

## 2023-07-24 RX ADMIN — Medication 2000 MG: at 01:23

## 2023-07-24 RX ADMIN — HEPARIN SODIUM 5000 UNITS: 5000 INJECTION, SOLUTION INTRAVENOUS; SUBCUTANEOUS at 08:44

## 2023-07-24 RX ADMIN — POLYETHYLENE GLYCOL 3350 17 G: 17 POWDER, FOR SOLUTION ORAL at 08:45

## 2023-07-24 RX ADMIN — FAMOTIDINE 20 MG: 20 TABLET, FILM COATED ORAL at 08:45

## 2023-07-24 RX ADMIN — HEPARIN SODIUM 5000 UNITS: 5000 INJECTION, SOLUTION INTRAVENOUS; SUBCUTANEOUS at 20:28

## 2023-07-24 RX ADMIN — Medication 2000 MG: at 18:25

## 2023-07-24 RX ADMIN — Medication 10 ML: at 08:45

## 2023-07-24 NOTE — PROGRESS NOTES
[facility] Medicine Services  PROGRESS NOTE    Patient Name: Lissett Lowry  : 1950  MRN: [id number]    Date of Admission: 2023  Primary Care Physician: Konstantin Gibson MD    Subjective   Subjective     CC:  Emesis, abdominal pain    HPI:  Upset today as JENNIFER was canceled.   at bedside.     ROS:  Gen- No fevers, chills  CV- No chest pain, palpitations  Resp- No cough, dyspnea  GI- No N/V/D, abd pain      Objective   Objective     Vital Signs:   Temp:  [97.6 °F (36.4 °C)-98.8 °F (37.1 °C)] 97.6 °F (36.4 °C)  Heart Rate:  [62-78] 75  Resp:  [17-20] 17  BP: (141-163)/(66-93) 142/81  Flow (L/min):  [2] 2     Physical Exam:  Constitutional: No acute distress, awake, alert,  at bedside  HENT: NCAT, mucous membranes moist  Respiratory: Clear to auscultation bilaterally, respiratory effort normal   Cardiovascular: RRR, no murmurs, rubs, or gallops  Gastrointestinal: Positive bowel sounds, soft, nontender, nondistended, large reducible left ventral hernia  Musculoskeletal: No bilateral ankle edema  Psychiatric: Appropriate affect, cooperative  Neurologic: Oriented x 3, strength symmetric in all extremities, Cranial Nerves grossly intact to confrontation, speech clear  Skin: No rashes      Results Reviewed:  LAB RESULTS:      Lab 23  0822 23  0354 23  1125 23  0843   WBC 5.24 6.85  --  16.30*   HEMOGLOBIN 11.0* 11.2*  --  14.8   HEMATOCRIT 33.0* 33.7*  --  44.2   PLATELETS 149 191  --  294   NEUTROS ABS  --   --   --  14.58*   IMMATURE GRANS (ABS)  --   --   --  0.11*   LYMPHS ABS  --   --   --  0.83   MONOS ABS  --   --   --  0.77   EOS ABS  --   --   --  0.00   MCV 96.2 94.7  --  93.6   LACTATE  --   --  1.4 3.1*         Lab 23  0329 23  0003 23  1221 23  0353 23  2216 23  1752 23  0822 23  1550 23  0354 23  0843   SODIUM 141  --   --  148*  --   --  144  --  144 142   POTASSIUM 3.9   --   --  4.2  --  3.8 3.6 3.5 3.6 4.1   CHLORIDE 108*  --   --  114*  --   --  110*  --  105 99   CO2 24.0  --   --  23.0  --   --  26.0  --  29.0 27.0   ANION GAP 9.0  --   --  11.0  --   --  8.0  --  10.0 16.0*   BUN 8  --   --  16  --   --  27*  --  64* 64*   CREATININE 0.59  --   --  0.57  --   --  0.66  --  1.36* 1.94*   EGFR 95.3  --   --  96.1  --   --  92.8  --  41.2* 27.1*   GLUCOSE 105*  --   --  113*  --   --  79  --  100* 189*   CALCIUM 9.5  --   --  9.8  --   --  9.9  --  9.9 11.5*   MAGNESIUM  --  2.3  --  1.5*  --   --  1.7  --   --   --    PHOSPHORUS  --   --  2.3*  --  1.5* 1.4* 1.4*  --   --   --          Lab 07/20/23  0843   TOTAL PROTEIN 7.7   ALBUMIN 4.4   GLOBULIN 3.3   ALT (SGPT) 17   AST (SGOT) 23   BILIRUBIN 0.6   ALK PHOS 74   LIPASE 15         Lab 07/20/23  1125 07/20/23  0843   HSTROP T 30* 35*                 Brief Urine Lab Results  (Last result in the past 365 days)        Color   Clarity   Blood   Leuk Est   Nitrite   Protein   CREAT   Urine HCG        07/20/23 1209 Dark Yellow   Cloudy   Negative   Moderate (2+)   Negative   Trace                   Microbiology Results Abnormal       Procedure Component Value - Date/Time    Blood Culture - Blood, Hand, Left [[id number]]  (Normal) Collected: 07/22/23 1752    Lab Status: Preliminary result Specimen: Blood from Hand, Left Updated: 07/23/23 1831     Blood Culture No growth at 24 hours    Blood Culture - Blood, Arm, Left [[id number]]  (Normal) Collected: 07/22/23 1752    Lab Status: Preliminary result Specimen: Blood from Arm, Left Updated: 07/23/23 1831     Blood Culture No growth at 24 hours    Blood Culture - Blood, Arm, Right [[id number]]  (Normal) Collected: 07/20/23 1220    Lab Status: Preliminary result Specimen: Blood from Arm, Right Updated: 07/23/23 1245     Blood Culture No growth at 3 days            FL Small Bowel Follow Through Single-Contrast    Result Date: 7/22/2023  FL SMALL BOWEL FOLLOW THROUGH SINGLE-CONTRAST Date of  Exam: 7/22/2023 10:00 AM EDT Indication: sbo. Comparison: None available. Technique:   image of the abdomen was obtained. Patient ingested medium density barium for single contrast imaging of the small bowel.  Examination was recorded with multiple large field of view radiographs and spot fluoroscopic images. Findings: Initial  view of the abdomen demonstrates gas-filled segments of both small and large bowel, without evidence of obstruction. A nasogastric tube is noted in the stomach. There is no overt pneumoperitoneum. Water-soluble contrast was subsequently administered via the nasogastric tube. Subsequent 30-minute, 2-hour and 4-hour images demonstrate distal progression of contrast, with right colon visualized on the 4-hour image. There is no extravasation of contrast.     Impression: Impression: Somewhat delayed transit of contrast with gas-filled segments of both small and large bowel suggesting ileus. No evidence of high-grade obstruction. Electronically Signed: Tyrone Ceja  7/22/2023 7:34 PM EDT  Workstation ID: [id number]     Results for orders placed during the hospital encounter of 07/20/23    Adult Transthoracic Echo Limited W/ Cont if Necessary Per Protocol    Interpretation Summary    Left ventricular systolic function is normal. Left ventricular ejection fraction appears to be 61 - 65%.    Moderate mitral annular calcification is present.  Cannot exclude a small adherent vegetation on the posterior surface of the mitral valve.  There is no significant mitral valve stenosis or regurgitation    Other cardiac valves appear normal with no evidence of vegetation    Recommend transesophageal echocardiogram for definitive assessment for endocarditis      Current medications:  Scheduled Meds:ceFAZolin, 2,000 mg, Intravenous, Q8H  famotidine, 20 mg, Oral, BID AC  heparin (porcine), 5,000 Units, Subcutaneous, Q12H  insulin regular, 2-7 Units, Subcutaneous, Q6H  nebivolol, 10 mg, Oral,  Daily  polyethylene glycol, 17 g, Oral, Daily  rosuvastatin, 5 mg, Oral, Nightly  sertraline, 100 mg, Oral, Daily  sodium chloride, 10 mL, Intravenous, Q12H      Continuous Infusions:dextrose, 25 mL/hr, Last Rate: 25 mL/hr (07/23/23 1806)      PRN Meds:.  acetaminophen    Calcium Replacement - Follow Nurse / BPA Driven Protocol    dextrose    dextrose    glucagon (human recombinant)    Magnesium Standard Dose Replacement - Follow Nurse / BPA Driven Protocol    Morphine **AND** naloxone    Phosphorus Replacement - Follow Nurse / BPA Driven Protocol    Potassium Replacement - Follow Nurse / BPA Driven Protocol    sodium chloride    sodium chloride    Assessment & Plan   Assessment & Plan     Active Hospital Problems    Diagnosis  POA    **SBO (small bowel obstruction) [K56.609]  Yes    Severe malnutrition [E43]  Yes    Sjogren syndrome, unspecified [M35.00]  Unknown    HTN (hypertension) [I10]  Yes    HLD (hyperlipidemia) [E78.5]  Yes    PHYLLIS on CPAP [G47.33, Z99.89]  Not Applicable    Controlled type 2 diabetes mellitus without complication, with long-term current use of insulin [E11.9, Z79.4]  Not Applicable      Resolved Hospital Problems   No resolved problems to display.        Brief Hospital Course to date:  Lissett Lowry is a 73 y.o. female  w h/o large abdominal hernia, undergoing OP w/u for surgery w Dr FAYE, DMII, h/o PE 2021 gary-op who presented after 2+ days of abdominal pain, dark emesis. Found to have SBO due to abdominal wall hernia.    This patient's problems and plans were partially entered by my partner and updated as appropriate by me 07/24/23.       SBO due to abdominal wall hernia  -surgery following.  Recs NPO,  IVF.  Holding xarelto  -having BM's.  SBFT somewhat delayed/?ileus, without gross obstruction.  Recs abdominal binder at all times.   Started clear liquids      RLL PNA, likely aspiration in setting of significant vomiting  -Note has hives to cefadroxil which has side chain in common w  unasyn + zosyn. -Continue cefazolin  -ID following.     MSSA  --ID following.  Recs cefazolin, plans PICC which can be placed after blood cultures cleared x 72hours, plans for at least 4 weeks IV abx.  Follow cultures.    --echo shows EF 61-65%, CAN NOT Exclude small vegetation on posterior surface of MV.  Will need JENNIFER, which had been ordered for today, but vascular lab did not have room in schedule. Plan for tomorrow am    H/o PE 2021 - PE was perioperative in 2021, on low-dose xarelto since. Last dose 7/17 PM. Will hold in case need for surgery, DVT ppx throughout gary-op period given history     KARLA  --resolved with IVF  --holding bumex     Chronic bumex use - family denies h/o CHF but on high dose bumex. Hold for now given dehydration on admission       DMII with hypoglycemia  - hold ozempic. Low-dose  SSI  --has been on D5 1/2NS to maintain glucose but sodium increased so change IVF to D5W at 25/hr. Hopefully glucose will improve as diet advanced    Sjogren - hold HCQ for now  HLD - statin  HTN - continue nebivolol, hold other BP meds  Mood disorder - home sertraline  Multiple listed medication allergies - complicates care, consider allergy referral as OP    D/w  at bedside on 7/24/23      Expected Discharge Location and Transportation: home  Expected Discharge   Expected Discharge Date: 7/26/2023; Expected Discharge Time:      DVT prophylaxis:  Medical DVT prophylaxis orders are present.     AM-PAC 6 Clicks Score (PT): 22 (07/23/23 2015)    CODE STATUS:   Code Status and Medical Interventions:   Ordered at: 07/20/23 1417     Level Of Support Discussed With:    Patient    Next of Kin (If No Surrogate)     Code Status (Patient has no pulse and is not breathing):    CPR (Attempt to Resuscitate)     Medical Interventions (Patient has pulse or is breathing):    Full Support     Release to patient:    Routine Release       Adrianne Riggins MD  07/24/23

## 2023-07-24 NOTE — CONSULTS
Clinical Nutrition   Nutrition Assessment  Reason for Visit: NPO/Clear liquid  x 4 days     Patient Name: Lissett Lowry  YOB: 1950  MRN: [id number]  Date of Encounter: 07/24/23 11:51 EDT  Admission date: 7/20/2023    Comments:  Remains on clear liquids; SBO resolving per surgeons note.  Boost Breeze started 7/23, patient is not drinking, she does not like it, RD will  D/C. Willing to try regular Boost once diet is advanced past clear liquids.     Nutrition Assessment   Admission Diagnosis:  SBO (small bowel obstruction) [K56.609]    Problem List:    SBO (small bowel obstruction)    Controlled type 2 diabetes mellitus without complication, with long-term current use of insulin    HTN (hypertension)    HLD (hyperlipidemia)    PHYLLIS on CPAP    Sjogren syndrome, unspecified    Severe malnutrition  Severe Malnutrition    PMH:   She  has a past medical history of Anemia, Chronic kidney disease, stage 3, Chronic pain in left shoulder, Depression, Diabetes mellitus, Disease of thyroid gland, Gout, Heart disease, History of Clostridium difficile infection, History of kidney stones, Hyperlipidemia, Hypertension, IBS (irritable bowel syndrome), Kidney stone, Obesity, PHYLLIS on CPAP, Osteoarthritis of right elbow, Osteoporosis, Primary osteoarthritis of left knee, Rotator cuff tear, Sjogren's syndrome, SLE (systemic lupus erythematosus), and SOB (shortness of breath).    PSH:  She  has a past surgical history that includes Appendectomy; Cholecystectomy; Hysterectomy; Cataract extraction; Other surgical history; Colonoscopy (2017); Esophagogastroduodenoscopy; Cardiac catheterization; and Total knee arthroplasty (Left, 8/26/2019).    Applicable Nutrition Concerns:   Skin:  Oral:  GI: NGT to LWS    Applicable Interval History:   (7/21) SBFT - Somewhat delayed transit of contrast with gas-filled segments of both small and large bowel suggesting ileus. No evidence of high-grade obstruction.  "    Reported/Observed/Food/Nutrition Related History:   7/24  Lunch tray at bedside, about 75% consumed. Patient reports she is tolerating it ok.  She tried Boost breeze and [place] snot like it, she does not want to try other flavors, she asked for RD to D/C it. Willing to try Boost G.C. in chocolate once diet is advanced.       7/21  Pt laying in bed with spouse at bedside - able to provide most weight/nutrition hx, addition information provided by spouse. Reports last meal tolerated Tuesday afternoon, ate supper but vomited meal. NGT in place to LWS - noted 2125mL OP in past 24hrs. Pt reports XL BM this morning.     In regards to diet hx, reports ~2 meals daily with snacks. When describing meals, spouse indicates full meal ~1 handful is equivalent to food consumed. Has been taking Ozempic for ~1.5yrs with associated wt loss, however discussed importance of intentional gradual loss with lifestyle changes vs unintentional loss due to inadequate energy intake, verbalized understanding. Pt with known Sjogren's syndrome and reports difficulty chewing/swallowing due to limited saliva produced - denies feeling choked. [facility]      Anthropometrics     Flowsheet Rows      Flowsheet Row First Filed Value   Admission Height 167.6 cm (66\") Documented at 07/20/2023 0730   Admission Weight 82.6 kg (182 lb) Documented at 07/20/2023 0730            Height: Height: 167.6 cm (65.98\")  Last Filed Weight: Weight: 82.7 kg (182 lb 5.1 oz) (07/21/23 1752)  Method: Weight Method: Standing scale  BMI: BMI (Calculated): 29.4  BMI classification: Normal: 18.5-24.9kg/m2  IBW:   130lbs    UBW:     Weight       Weight (kg) Weight (lbs) Weight Method Visit Report   8/22/2019 129 kg  284 lb 6.3 oz  Standing scale     8/26/2019 129 kg  284 lb 6.3 oz  Stated     7/20/2023 82.736 kg  182 lb 6.4 oz  Standing scale      82.555 kg  182 lb  Stated       Weight change: weight loss of 10 lbs (5.2%) over the past 1 month(s)    Intentional?  No    Nutrition " Focused Physical Exam     Date:  7/21       Patient meets criteria for malnutrition diagnosis, see MSA note.     Current Nutrition Prescription   PO: Diet: Liquid Diets; Clear Liquid; Texture: Regular Texture (IDDSI 7); Fluid Consistency: Thin (IDDSI 0)  NPO Diet NPO Type: Strict NPO  Oral Nutrition Supplement: Boost Breeze   Intake: Insufficient data    Nutrition Diagnosis   Date:  7/21            Updated:    Problem Malnutrition  acute, severe   Etiology Medication induced anorexia, sjogren's syndrome   Signs/Symptoms moderate muscle wasting and subcutaneous fat loss, <50% of EEN for >5d, and significant weight loss of 5.2% x1 month   Status: New    Date:   7/21    Updated:     Problem Predicted suboptimal energy intake   Etiology SBO   Signs/Symptoms NPO x1d   Status: New    Goal:   General: Nutrition to support treatment  PO: Advace diet as medically feasible/appropriate  EN/PN: N/A    Nutrition Intervention      Follow treatment progress, Care plan reviewed, Adjusted supplement, Encourage intake    Will D/C boost breeze   Boost G.C. chocolate once diet is advanced.       Monitoring/Evaluation:   Per protocol, I&O, Pertinent labs, GI status, Symptoms, POC/GOC      Valeria Merino RD  Time Spent: 25min

## 2023-07-24 NOTE — CASE MANAGEMENT/SOCIAL WORK
Continued Stay Note  [facility]     Patient Name: Lissett Lowry  MRN: [id number]  Today's Date: 7/24/2023    Admit Date: 7/20/2023    Plan: Home with spouse   Discharge Plan       Row Name 07/24/23 1000       Plan    Plan Home with spouse    Patient/Family in Agreement with Plan yes    Plan Comments Spoke with patient at bedside. Plan is home with spouse. Spouse is upset about patient not having her JENNIFER today. CM is unable to locate anything on the OR scheduled. Patient denies any discharge needs at this time. CM will continue to follow.    Final Discharge Disposition Code 01 - home or self-care                   Discharge Codes    No documentation.                 Expected Discharge Date and Time       Expected Discharge Date Expected Discharge Time    Jul 27, 2023               Tommie Rodriguez RN

## 2023-07-24 NOTE — PROGRESS NOTES
"  INFECTIOUS DISEASES  INPATIENT PROGRESS NOTE  2023      PATIENT NAME: Lissett Lowry  :  1950  MRN:  [id number]  Date of Admission:  2023      Antimicrobials:  Cefazoin 2 g iv q8h      MAR reviewed.    Chief Complaint   Patient presents with    Vomiting    Constipation       Reason for consultation:  MSSA bacteremia    Interval history:  IV infiltrated today in left arm.  No fevers.  JENNIFER postponed to tomorrow.  No cough.  No dyspnea.    ROS:  No fevers/chills overnight.  No new rashes.  No SOB or chest pain.  Denies side effects from antimicrobials.      Objective:  Temp (24hrs), Av.4 °F (36.9 °C), Min:97.6 °F (36.4 °C), Max:98.8 °F (37.1 °C)    /75   Pulse 75   Temp 98.5 °F (36.9 °C) (Oral)   Resp 17   Ht 167.6 cm (65.98\")   Wt 82.7 kg (182 lb 5.1 oz)   SpO2 93%   BMI 29.44 kg/m²     Physical Examination:  GENERAL: Improved appearing female.  Awake and alert, in no acute distress.   HEENT: Normocephalic, atraumatic.  EOMI. No conjunctival injection or subconjunctival hemorrhage.   CV: RRR. No murmur, rubs, gallops.  Normal S1S2.  LUNGS: Prior faint bilateral rhonchi, nearly resolved. Normal respiratory effort.  ABDOMEN: +bowel sounds.  Soft, no TTP, less distended.  +hernia.  EXT:  No edema.    MSK: left arm with edema at IV infiltration site, +erythema and ecchymosis.  S/p left TKA, no inflammation.  SKIN: Warm and dry without rash or ulcer.  NEURO: A&Ox4. No focal deficits.  Face symmetric.  Speech fluent.  Moves all extremities well.   PSYCHIATRIC: Normal insight and judgement.  Cooperative.  Normal affect.    Laboratory Data:    Results from last 7 days   Lab Units 23  0822 23  0354 23  0843   WBC 10*3/mm3 5.24 6.85 16.30*   HEMOGLOBIN g/dL 11.0* 11.2* 14.8   HEMATOCRIT % 33.0* 33.7* 44.2   PLATELETS 10*3/mm3 149 191 294       Results from last 7 days   Lab Units 23  0329   SODIUM mmol/L 141   POTASSIUM mmol/L 3.9   CHLORIDE mmol/L 108*   CO2 " mmol/L 24.0   BUN mg/dL 8   CREATININE mg/dL 0.59   GLUCOSE mg/dL 105*   CALCIUM mg/dL 9.5       Results from last 7 days   Lab Units 07/20/23  0843   ALK PHOS U/L 74   BILIRUBIN mg/dL 0.6   ALT (SGPT) U/L 17   AST (SGOT) U/L 23               Estimated Creatinine Clearance: 92.1 mL/min (by C-G formula based on SCr of 0.59 mg/dL).  Results from last 7 days   Lab Units 07/20/23  1125   LACTATE mmol/L 1.4                       Microbiology:    Microbiology Results (last 10 days)       Procedure Component Value - Date/Time    Blood Culture - Blood, Hand, Left [[id number]]  (Normal) Collected: 07/22/23 1752    Lab Status: Preliminary result Specimen: Blood from Hand, Left Updated: 07/23/23 1831     Blood Culture No growth at 24 hours    Blood Culture - Blood, Arm, Left [[id number]]  (Normal) Collected: 07/22/23 1752    Lab Status: Preliminary result Specimen: Blood from Arm, Left Updated: 07/23/23 1831     Blood Culture No growth at 24 hours    Blood Culture - Blood, Arm, Left [[id number]]  (Abnormal)  (Susceptibility) Collected: 07/20/23 1226    Lab Status: Final result Specimen: Blood from Arm, Left Updated: 07/23/23 0605     Blood Culture Staphylococcus aureus     Comment:   Infectious disease consultation is highly recommended to rule out distant foci of infection.        Isolated from Aerobic and Anaerobic Bottles     Gram Stain Aerobic Bottle Gram positive cocci in groups      Anaerobic Bottle Gram positive cocci in groups    Susceptibility        Staphylococcus aureus      KENNEDI      Gentamicin Susceptible      Oxacillin Susceptible      Rifampin Susceptible      Vancomycin Susceptible                       Susceptibility Comments       Staphylococcus aureus    This isolate does not demonstrate inducible clindamycin resistance in vitro.                 Blood Culture ID, PCR - Blood, Arm, Left [[id number]]  (Abnormal) Collected: 07/20/23 1226    Lab Status: Final result Specimen: Blood from Arm, Left Updated: 07/21/23  0246     BCID, PCR Staph aureus. mecA/C and MREJ (methicillin resistance gene) NOT detected. Identification by BCID2 PCR    Narrative:      Infectious disease consultation is highly recommended to rule out distant foci of infection.    Blood Culture - Blood, Arm, Right [[id number]]  (Normal) Collected: 07/20/23 1220    Lab Status: Preliminary result Specimen: Blood from Arm, Right Updated: 07/24/23 1245     Blood Culture No growth at 4 days              Radiology:  No radiology results for the last day    TTE:    Left ventricular systolic function is normal. Left ventricular ejection fraction appears to be 61 - 65%.    Moderate mitral annular calcification is present.  Cannot exclude a small adherent vegetation on the posterior surface of the mitral valve.  There is no significant mitral valve stenosis or regurgitation    Other cardiac valves appear normal with no evidence of vegetation    Recommend transesophageal echocardiogram for definitive assessment for endocarditis    DISCUSSION:  73 y.o. female with history of large abdominal wall hernia admitted with small bowel obstruction.   Also noted to have patchy bilateral pulmonary infiltrates and leukocytosis.  Blood cultures with 1 set positive for MSSA.  Indwelling hardware of the left knee and lumbar spine.  No increased inflammation at the sites.     PROBLEM LIST:   -- MSSA bacteremia, unclear source.  Possibly pneumonia. TTE with no obvious vegetation but could not r/o on MV; will need JENNIFER.  -- Leukocytosis, resolved.  -- Patchy bilateral pulmonary opacities in setting of recent vomiting.  Consider aspiration.  -- Small bowel obstruction in setting of large abdominal wall hernia.  Resolved.  -- Acute kidney injury, resolved.  -- History of left knee arthroplasty, no inflammation at the site.  History of lumbar spinal hardware, no increased pain at the site.  -- Poor IV access.    PLAN:  -- Continue cefazolin 2 g IV every 8 hours  -- Pending JENNIFER to better assess  for endocarditis  -- Follow repeat blood cultures x2 to assess for clearing - no growth to date  -- Final plans pending clinical course.  Will need PICC and at least 4 weeks IV abx for complicated Staph aureus bacteremia.    -- Proceed with PICC    Plan discussed with patient.    Durga Mckay MD  7/24/2023  17:54 EDT

## 2023-07-24 NOTE — PROGRESS NOTES
"General Surgery Daily Progress Note    Subjective:  Tolerating p.o. with GI function.    Objective:  /81   Pulse 75   Temp 97.6 °F (36.4 °C) (Oral)   Resp 17   Ht 167.6 cm (65.98\")   Wt 82.7 kg (182 lb 5.1 oz)   SpO2 93%   BMI 29.44 kg/m²       General Appearance: Minimal distress  Eyes: Anicteric  Neck: Trachea midline   Cardiovascular:  RRR without murmur nor rub  Lungs:  Bilateral respirations unlabored   Abdomen:  Soft, reducible hernias, abdominal binder in place  Extremities:  No cyanosis or edema   Skin:  No obvious rashes   Neurologic: awake and conversant       Imaging Results (Last 24 Hours)       ** No results found for the last 24 hours. **            CBC:  Results from last 7 days   Lab Units 07/22/23  0822   WBC 10*3/mm3 5.24   HEMOGLOBIN g/dL 11.0*   HEMATOCRIT % 33.0*   PLATELETS 10*3/mm3 149       CMP:  Results from last 7 days   Lab Units 07/24/23  0329 07/21/23  0354 07/20/23  0843   SODIUM mmol/L 141   < > 142   POTASSIUM mmol/L 3.9   < > 4.1   CHLORIDE mmol/L 108*   < > 99   CO2 mmol/L 24.0   < > 27.0   BUN mg/dL 8   < > 64*   CREATININE mg/dL 0.59   < > 1.94*   CALCIUM mg/dL 9.5   < > 11.5*   BILIRUBIN mg/dL  --   --  0.6   ALK PHOS U/L  --   --  74   ALT (SGPT) U/L  --   --  17   AST (SGOT) U/L  --   --  23   GLUCOSE mg/dL 105*   < > 189*    < > = values in this interval not displayed.         Assessment:  Small bowel obstruction resolving  Complex abdominal wall hernias    Plan:   Go ahead and advance diet as tolerated to a regular diet, low residue.  Mobilize  As long as she tolerates a regular diet we can improve her nutritional status, treat her pneumonitis, and medically optimize her for future hernia repair.    Tommie Vale MD - 7/24/2023, 15:12 EDT         "

## 2023-07-25 ENCOUNTER — HOME HEALTH ADMISSION (OUTPATIENT)
Dept: HOME HEALTH SERVICES | Facility: HOME HEALTHCARE | Age: 73
End: 2023-07-25
Payer: MEDICARE

## 2023-07-25 ENCOUNTER — READMISSION MANAGEMENT (OUTPATIENT)
Dept: CALL CENTER | Facility: HOSPITAL | Age: 73
End: 2023-07-25
Payer: MEDICARE

## 2023-07-25 ENCOUNTER — APPOINTMENT (OUTPATIENT)
Dept: CARDIOLOGY | Facility: HOSPITAL | Age: 73
DRG: 393 | End: 2023-07-25
Payer: MEDICARE

## 2023-07-25 VITALS
SYSTOLIC BLOOD PRESSURE: 158 MMHG | OXYGEN SATURATION: 94 % | HEART RATE: 64 BPM | DIASTOLIC BLOOD PRESSURE: 83 MMHG | RESPIRATION RATE: 22 BRPM | HEIGHT: 66 IN | WEIGHT: 182.1 LBS | BODY MASS INDEX: 29.27 KG/M2 | TEMPERATURE: 98.9 F

## 2023-07-25 LAB
ANION GAP SERPL CALCULATED.3IONS-SCNC: 10 MMOL/L (ref 5–15)
BACTERIA SPEC AEROBE CULT: NORMAL
BASOPHILS # BLD AUTO: 0.03 10*3/MM3 (ref 0–0.2)
BASOPHILS NFR BLD AUTO: 0.5 % (ref 0–1.5)
BUN SERPL-MCNC: 7 MG/DL (ref 8–23)
BUN/CREAT SERPL: 11.5 (ref 7–25)
CALCIUM SPEC-SCNC: 9.7 MG/DL (ref 8.6–10.5)
CHLORIDE SERPL-SCNC: 107 MMOL/L (ref 98–107)
CO2 SERPL-SCNC: 26 MMOL/L (ref 22–29)
CREAT SERPL-MCNC: 0.61 MG/DL (ref 0.57–1)
DEPRECATED RDW RBC AUTO: 41 FL (ref 37–54)
EGFRCR SERPLBLD CKD-EPI 2021: 94.5 ML/MIN/1.73
EOSINOPHIL # BLD AUTO: 0 10*3/MM3 (ref 0–0.4)
EOSINOPHIL NFR BLD AUTO: 0 % (ref 0.3–6.2)
ERYTHROCYTE [DISTWIDTH] IN BLOOD BY AUTOMATED COUNT: 12.1 % (ref 12.3–15.4)
GLUCOSE BLDC GLUCOMTR-MCNC: 86 MG/DL (ref 70–130)
GLUCOSE BLDC GLUCOMTR-MCNC: 93 MG/DL (ref 70–130)
GLUCOSE SERPL-MCNC: 97 MG/DL (ref 65–99)
HCT VFR BLD AUTO: 33 % (ref 34–46.6)
HGB BLD-MCNC: 11.2 G/DL (ref 12–15.9)
IMM GRANULOCYTES # BLD AUTO: 0.19 10*3/MM3 (ref 0–0.05)
IMM GRANULOCYTES NFR BLD AUTO: 2.9 % (ref 0–0.5)
LV EF 2D ECHO EST: 65 %
LYMPHOCYTES # BLD AUTO: 0.89 10*3/MM3 (ref 0.7–3.1)
LYMPHOCYTES NFR BLD AUTO: 13.8 % (ref 19.6–45.3)
MCH RBC QN AUTO: 31.5 PG (ref 26.6–33)
MCHC RBC AUTO-ENTMCNC: 33.9 G/DL (ref 31.5–35.7)
MCV RBC AUTO: 92.7 FL (ref 79–97)
MONOCYTES # BLD AUTO: 0.58 10*3/MM3 (ref 0.1–0.9)
MONOCYTES NFR BLD AUTO: 9 % (ref 5–12)
NEUTROPHILS NFR BLD AUTO: 4.77 10*3/MM3 (ref 1.7–7)
NEUTROPHILS NFR BLD AUTO: 73.8 % (ref 42.7–76)
NRBC BLD AUTO-RTO: 0 /100 WBC (ref 0–0.2)
PLATELET # BLD AUTO: 179 10*3/MM3 (ref 140–450)
PMV BLD AUTO: 11.5 FL (ref 6–12)
POTASSIUM SERPL-SCNC: 3.8 MMOL/L (ref 3.5–5.2)
RBC # BLD AUTO: 3.56 10*6/MM3 (ref 3.77–5.28)
SODIUM SERPL-SCNC: 143 MMOL/L (ref 136–145)
WBC NRBC COR # BLD: 6.46 10*3/MM3 (ref 3.4–10.8)

## 2023-07-25 PROCEDURE — 25010000002 CEFTRIAXONE PER 250 MG: Performed by: INTERNAL MEDICINE

## 2023-07-25 PROCEDURE — 76376 3D RENDER W/INTRP POSTPROCES: CPT | Performed by: INTERNAL MEDICINE

## 2023-07-25 PROCEDURE — 25010000002 FENTANYL CITRATE (PF) 50 MCG/ML SOLUTION: Performed by: INTERNAL MEDICINE

## 2023-07-25 PROCEDURE — 76376 3D RENDER W/INTRP POSTPROCES: CPT

## 2023-07-25 PROCEDURE — 93312 ECHO TRANSESOPHAGEAL: CPT | Performed by: INTERNAL MEDICINE

## 2023-07-25 PROCEDURE — 85025 COMPLETE CBC W/AUTO DIFF WBC: CPT | Performed by: INTERNAL MEDICINE

## 2023-07-25 PROCEDURE — 02HV33Z INSERTION OF INFUSION DEVICE INTO SUPERIOR VENA CAVA, PERCUTANEOUS APPROACH: ICD-10-PCS | Performed by: INTERNAL MEDICINE

## 2023-07-25 PROCEDURE — 93320 DOPPLER ECHO COMPLETE: CPT | Performed by: INTERNAL MEDICINE

## 2023-07-25 PROCEDURE — 94799 UNLISTED PULMONARY SVC/PX: CPT

## 2023-07-25 PROCEDURE — 99153 MOD SED SAME PHYS/QHP EA: CPT

## 2023-07-25 PROCEDURE — 99152 MOD SED SAME PHYS/QHP 5/>YRS: CPT | Performed by: INTERNAL MEDICINE

## 2023-07-25 PROCEDURE — 93320 DOPPLER ECHO COMPLETE: CPT

## 2023-07-25 PROCEDURE — 25010000002 CEFAZOLIN IN DEXTROSE 2-4 GM/100ML-% SOLUTION: Performed by: INTERNAL MEDICINE

## 2023-07-25 PROCEDURE — 93325 DOPPLER ECHO COLOR FLOW MAPG: CPT

## 2023-07-25 PROCEDURE — 25010000002 HEPARIN (PORCINE) PER 1000 UNITS: Performed by: INTERNAL MEDICINE

## 2023-07-25 PROCEDURE — 93325 DOPPLER ECHO COLOR FLOW MAPG: CPT | Performed by: INTERNAL MEDICINE

## 2023-07-25 PROCEDURE — 80048 BASIC METABOLIC PNL TOTAL CA: CPT | Performed by: INTERNAL MEDICINE

## 2023-07-25 PROCEDURE — 25010000002 MIDAZOLAM PER 1 MG: Performed by: INTERNAL MEDICINE

## 2023-07-25 PROCEDURE — 99152 MOD SED SAME PHYS/QHP 5/>YRS: CPT

## 2023-07-25 PROCEDURE — C1751 CATH, INF, PER/CENT/MIDLINE: HCPCS

## 2023-07-25 PROCEDURE — 97116 GAIT TRAINING THERAPY: CPT

## 2023-07-25 PROCEDURE — 82948 REAGENT STRIP/BLOOD GLUCOSE: CPT

## 2023-07-25 PROCEDURE — 93312 ECHO TRANSESOPHAGEAL: CPT

## 2023-07-25 PROCEDURE — 97110 THERAPEUTIC EXERCISES: CPT

## 2023-07-25 RX ORDER — SODIUM CHLORIDE 0.9 % (FLUSH) 0.9 %
10 SYRINGE (ML) INJECTION AS NEEDED
Status: DISCONTINUED | OUTPATIENT
Start: 2023-07-25 | End: 2023-07-25 | Stop reason: HOSPADM

## 2023-07-25 RX ORDER — FENTANYL CITRATE 50 UG/ML
INJECTION, SOLUTION INTRAMUSCULAR; INTRAVENOUS
Status: COMPLETED | OUTPATIENT
Start: 2023-07-25 | End: 2023-07-25

## 2023-07-25 RX ORDER — SODIUM CHLORIDE 0.9 % (FLUSH) 0.9 %
20 SYRINGE (ML) INJECTION AS NEEDED
Status: DISCONTINUED | OUTPATIENT
Start: 2023-07-25 | End: 2023-07-25 | Stop reason: HOSPADM

## 2023-07-25 RX ORDER — SODIUM CHLORIDE 0.9 % (FLUSH) 0.9 %
10 SYRINGE (ML) INJECTION EVERY 12 HOURS SCHEDULED
Status: DISCONTINUED | OUTPATIENT
Start: 2023-07-25 | End: 2023-07-25 | Stop reason: HOSPADM

## 2023-07-25 RX ORDER — CEFAZOLIN SODIUM 2 G/100ML
2000 INJECTION, SOLUTION INTRAVENOUS EVERY 8 HOURS
Qty: 900 ML | Refills: 0 | Status: SHIPPED | OUTPATIENT
Start: 2023-07-25 | End: 2023-07-25 | Stop reason: HOSPADM

## 2023-07-25 RX ORDER — MIDAZOLAM HYDROCHLORIDE 1 MG/ML
INJECTION INTRAMUSCULAR; INTRAVENOUS
Status: COMPLETED | OUTPATIENT
Start: 2023-07-25 | End: 2023-07-25

## 2023-07-25 RX ADMIN — FENTANYL CITRATE 25 MCG: 50 INJECTION, SOLUTION INTRAMUSCULAR; INTRAVENOUS at 11:25

## 2023-07-25 RX ADMIN — NEBIVOLOL 10 MG: 10 TABLET ORAL at 12:26

## 2023-07-25 RX ADMIN — SODIUM CHLORIDE 2000 MG: 900 INJECTION INTRAVENOUS at 13:25

## 2023-07-25 RX ADMIN — HEPARIN SODIUM 5000 UNITS: 5000 INJECTION, SOLUTION INTRAVENOUS; SUBCUTANEOUS at 08:35

## 2023-07-25 RX ADMIN — MIDAZOLAM HYDROCHLORIDE 1 MG: 1 INJECTION, SOLUTION INTRAMUSCULAR; INTRAVENOUS at 11:25

## 2023-07-25 RX ADMIN — MIDAZOLAM HYDROCHLORIDE 1 MG: 1 INJECTION, SOLUTION INTRAMUSCULAR; INTRAVENOUS at 11:21

## 2023-07-25 RX ADMIN — SERTRALINE HYDROCHLORIDE 100 MG: 100 TABLET ORAL at 12:26

## 2023-07-25 RX ADMIN — Medication 2000 MG: at 02:53

## 2023-07-25 RX ADMIN — FENTANYL CITRATE 25 MCG: 50 INJECTION, SOLUTION INTRAMUSCULAR; INTRAVENOUS at 11:18

## 2023-07-25 RX ADMIN — FENTANYL CITRATE 25 MCG: 50 INJECTION, SOLUTION INTRAMUSCULAR; INTRAVENOUS at 11:21

## 2023-07-25 RX ADMIN — MIDAZOLAM HYDROCHLORIDE 1 MG: 1 INJECTION, SOLUTION INTRAMUSCULAR; INTRAVENOUS at 11:18

## 2023-07-25 NOTE — THERAPY TREATMENT NOTE
Patient Name: Lissett Lowry  : 1950    MRN: [id number]                              Today's Date: 2023       Admit Date: 2023    Visit Dx:     ICD-10-CM ICD-9-CM   1. SBO (small bowel obstruction)  K56.609 560.9   2. Nausea and vomiting, unspecified vomiting type  R11.2 787.01   3. Pneumonia of right lower lobe due to infectious organism  J18.9 486   4. KARLA (acute kidney injury)  N17.9 584.9     Patient Active Problem List   Diagnosis    Obesity    Controlled type 2 diabetes mellitus without complication, with long-term current use of insulin    Osteoporosis    Gout    Right leg pain    Right foot pain    Chronic right SI joint pain    Chronic bilateral low back pain without sciatica    BMI 45.0-49.9, adult    Primary osteoarthritis of left knee    Diabetes mellitus    DDD (degenerative disc disease), lumbar    Retrolisthesis of vertebrae    Physical deconditioning    Arthritis of left knee    Status post total left knee replacement    HTN (hypertension)    HLD (hyperlipidemia)    PHYLLIS on CPAP    CKD (chronic kidney disease)    Acute blood loss anemia, mild, asymptomatic    SBO (small bowel obstruction)    Sjogren syndrome, unspecified    Severe malnutrition     Past Medical History:   Diagnosis Date    Anemia     Chronic kidney disease, stage 3     Chronic pain in left shoulder     Depression     Diabetes mellitus     Disease of thyroid gland     as a child- underactive     Gout     Heart disease     History of Clostridium difficile infection     History of kidney stones     Hyperlipidemia     Hypertension     IBS (irritable bowel syndrome)     Kidney stone     Obesity     PHYLLIS on CPAP     Set to 10     Osteoarthritis of right elbow     Osteoporosis     Primary osteoarthritis of left knee     Rotator cuff tear     Sjogren's syndrome     SLE (systemic lupus erythematosus)     in remission    SOB (shortness of breath)      Past Surgical History:   Procedure Laterality Date    APPENDECTOMY       CARDIAC CATHETERIZATION      no stents    CATARACT EXTRACTION      bilateral    CHOLECYSTECTOMY      COLONOSCOPY  2017    ENDOSCOPY      HYSTERECTOMY      OTHER SURGICAL HISTORY      Vitrectomy    TOTAL KNEE ARTHROPLASTY Left 8/26/2019    Procedure: TOTAL KNEE ARTHROPLASTY LEFT;  Surgeon: Rony Rojas MD;  Location: [facility];  Service: Orthopedics      General Information       Row Name 07/25/23 0855          Physical Therapy Time and Intention    Document Type therapy note (daily note)  -AS     Mode of Treatment physical therapy  -AS       Row Name 07/25/23 0855          General Information    Patient Profile Reviewed yes  -AS     Existing Precautions/Restrictions fall;other (see comments)  -AS     Barriers to Rehab medically complex  -AS       Row Name 07/25/23 0855          Cognition    Orientation Status (Cognition) oriented x 4  -AS       Row Name 07/25/23 0855          Safety Issues, Functional Mobility    Safety Issues Affecting Function (Mobility) awareness of need for assistance;insight into deficits/self-awareness;safety precaution awareness;safety precautions follow-through/compliance;sequencing abilities;positioning of assistive device  -AS     Impairments Affecting Function (Mobility) balance;endurance/activity tolerance;postural/trunk control;shortness of breath;strength  -AS     Comment, Safety Issues/Impairments (Mobility) ALERT AND FOLLOWING COMMANDS, NEEDS ENCOURAGEMENT TO PARTICIPATE IN THERAPY  -AS               User Key  (r) = Recorded By, (t) = Taken By, (c) = Cosigned By      Initials Name Provider Type    AS Judi Jacobo, PTA Physical Therapist Assistant                   Mobility       Row Name 07/25/23 0856          Bed Mobility    Supine-Sit [place] (Bed Mobility) supervision  -AS     Sit-Supine [place] (Bed Mobility) supervision  -AS     Assistive Device (Bed Mobility) head of bed elevated;bed rails  -AS     Comment, (Bed Mobility) increased time and effort to reach  EOB, No physical assist needed  -AS       Row Name 07/25/23 0856          Transfers    Comment, (Transfers) cues to reach back when sitting back on bed  -AS       Row Name 07/25/23 0856          Bed-Chair Transfer    Bed-Chair [place] (Transfers) not tested  -AS     Comment, (Bed-Chair Transfer) patient requested back to bed  -AS       Row Name 07/25/23 0856          Sit-Stand Transfer    Sit-Stand [place] (Transfers) verbal cues;contact guard;1 person assist  -AS     Assistive Device (Sit-Stand Transfers) walker, front-wheeled  -AS     Comment, (Sit-Stand Transfer) cues for hand placement  -AS       Row Name 07/25/23 0856          Gait/Stairs (Locomotion)    [place] Level (Gait) verbal cues;contact guard;2 person assist  -AS     Assistive Device (Gait) walker, front-wheeled  -AS     Distance in Feet (Gait) 14 + 14  -AS     Deviations/Abnormal Patterns (Gait) bilateral deviations;base of support, narrow;gait speed decreased;stride length decreased;weight shifting decreased  -AS     Bilateral Gait Deviations forward flexed posture;weight shift ability decreased  -AS     Comment, (Gait/Stairs) patient ambulated 14' +14' with CGA x1 and rolling walker for support, tech assisted with IV pole. Verbal cues for walker placement and improvement in posture. Patient with c/o B LE weakness/shakiness limiting further distance.  -AS               User Key  (r) = Recorded By, (t) = Taken By, (c) = Cosigned By      Initials Name Provider Type    AS Judi Jacobo PTA Physical Therapist Assistant                   Obj/Interventions       Row Name 07/25/23 0904          Motor Skills    Therapeutic Exercise knee;ankle  -AS       Row Name 07/25/23 0904          Knee (Therapeutic Exercise)    Knee (Therapeutic Exercise) strengthening exercise  -AS     Knee Strengthening (Therapeutic Exercise) bilateral;marching while seated;LAQ (long arc quad);sitting;10 repetitions  -AS       Row Name 07/25/23 0904          Ankle  (Therapeutic Exercise)    Ankle (Therapeutic Exercise) AROM (active range of motion)  -AS     Ankle AROM (Therapeutic Exercise) bilateral;dorsiflexion;plantarflexion;sitting;10 repetitions  -AS       Row Name 07/25/23 0904          Balance    Dynamic Standing Balance verbal cues;contact guard;1-person assist  -AS     Position/Device Used, Standing Balance supported;walker, front-wheeled  -AS               User Key  (r) = Recorded By, (t) = Taken By, (c) = Cosigned By      Initials Name Provider Type    AS Judi Jacobo PTA Physical Therapist Assistant                   Goals/Plan    No documentation.                  Clinical Impression       Row Name 07/25/23 0904          Pain    Pretreatment Pain Rating 0/10 - no pain  -AS     Posttreatment Pain Rating 0/10 - no pain  -AS       Row Name 07/25/23 0904          Plan of Care Review    Plan of Care Reviewed With patient  -AS     Progress improving  -AS     Outcome Evaluation patient ambulated 14' +14' with CGA x1 and rolling walker for support, tech assisted with IV pole. Verbal cues for walker placement and improvement in posture. Patient with c/o B LE weakness/shakiness limiting further distance. Completed B LE exercises at EOB. Recommend home with assist and HHPT.  -AS       Row Name 07/25/23 0904          Positioning and Restraints    Pre-Treatment Position in bed  -AS     Post Treatment Position bed  -AS     In Bed supine;call light within reach;encouraged to call for assist;with family/caregiver  -AS               User Key  (r) = Recorded By, (t) = Taken By, (c) = Cosigned By      Initials Name Provider Type    AS Judi Jacobo PTA Physical Therapist Assistant                   Outcome Measures       Row Name 07/25/23 0905          How much help from another person do you currently need...    Turning from your back to your side while in flat bed without using bedrails? 4  -AS     Moving from lying on back to sitting on the side of a flat bed  without bedrails? 4  -AS     Moving to and from a bed to a chair (including a wheelchair)? 4  -AS     Standing up from a chair using your arms (e.g., wheelchair, bedside chair)? 4  -AS     Climbing 3-5 steps with a railing? 3  -AS     To walk in hospital room? 3  -AS     AM-PAC 6 Clicks Score (PT) 22  -AS     Highest level of mobility 7 --> Walked 25 feet or more  -AS       Row Name 07/25/23 0905          Functional Assessment    Outcome Measure Options AM-PAC 6 Clicks Basic Mobility (PT)  -AS               User Key  (r) = Recorded By, (t) = Taken By, (c) = Cosigned By      Initials Name Provider Type    AS Judi Jacobo, PTA Physical Therapist Assistant                                 Physical Therapy Education       Title: PT OT SLP Therapies (In Progress)       Topic: Physical Therapy (In Progress)       Point: Mobility training (In Progress)       Learning Progress Summary             Patient Acceptance, E, NR by AS at 7/25/2023 0905    Acceptance, E,TB, VU by KW at 7/21/2023 1053    Comment: continued benefit of skilled PT services                         Point: Home exercise program (In Progress)       Learning Progress Summary             Patient Acceptance, E, NR by AS at 7/25/2023 0905    Acceptance, E,TB, VU by KW at 7/21/2023 1053    Comment: continued benefit of skilled PT services                         Point: Body mechanics (In Progress)       Learning Progress Summary             Patient Acceptance, E, NR by AS at 7/25/2023 0905    Acceptance, E,TB, VU by KW at 7/21/2023 1053    Comment: continued benefit of skilled PT services                         Point: Precautions (In Progress)       Learning Progress Summary             Patient Acceptance, E, NR by AS at 7/25/2023 0905    Acceptance, E,TB, VU by KW at 7/21/2023 1053    Comment: continued benefit of skilled PT services                                         User Key       Initials Effective Dates Name Provider Type Discipline    AS  04/28/23 -  Judi Jacobo PTA Physical Therapist Assistant PT    KW 01/27/22 -  Sherrie Munoz PT Physical Therapist PT                  PT Recommendation and Plan     Plan of Care Reviewed With: patient  Progress: improving  Outcome Evaluation: patient ambulated 14' +14' with CGA x1 and rolling walker for support, tech assisted with IV pole. Verbal cues for walker placement and improvement in posture. Patient with c/o B LE weakness/shakiness limiting further distance. Completed B LE exercises at EOB. Recommend home with assist and HHPT.     Time Calculation:         PT Charges       Row Name 07/25/23 0906             Time Calculation    Start Time 0811  -AS      PT Received On 07/25/23  -AS      PT Goal Re-Cert Due Date 07/31/23  -AS         Timed Charges    47717 - PT Therapeutic Exercise Minutes 9  -AS      04697 - Gait Training Minutes  15  -AS         Total Minutes    Timed Charges Total Minutes 24  -AS       Total Minutes 24  -AS                User Key  (r) = Recorded By, (t) = Taken By, (c) = Cosigned By      Initials Name Provider Type    AS Judi Jacobo PTA Physical Therapist Assistant                  Therapy Charges for Today       Code Description Service Date Service Provider Modifiers Qty    17352456519 HC PT THER PROC EA 15 MIN 7/25/2023 Judi Jacobo, ADALGISA GP 1    59807363688 HC GAIT TRAINING EA 15 MIN 7/25/2023 Judi Jacobo PTA GP 1    54422176407 HC PT THER SUPP EA 15 MIN 7/25/2023 Judi Jacobo PTA GP 2            PT G-Codes  Outcome Measure Options: AM-PAC 6 Clicks Basic Mobility (PT)  AM-PAC 6 Clicks Score (PT): 22  AM-PAC 6 Clicks Score (OT): 17       Judi Jacobo PTA  7/25/2023

## 2023-07-25 NOTE — OUTREACH NOTE
Prep Survey      Flowsheet Row Responses   [facility] facility patient discharged from? [place]   Is LACE score < 7 ? No   Eligibility Readm Mgmt   Discharge diagnosis SBO (small bowel obstruction), MSSA bacteremia   Does the patient have one of the following disease processes/diagnoses(primary or secondary)? Other   Does the patient have Home health ordered? Yes   What is the Home health agency?  [facility]/ [facility]   Medication alerts for this patient IV ABX   Prep survey completed? Yes            Taylor MIX - Registered Nurse

## 2023-07-25 NOTE — PLAN OF CARE
Problem: Fall Injury Risk  Goal: Absence of Fall and Fall-Related Injury  Outcome: Ongoing, Progressing  Intervention: Promote Injury-Free Environment  Recent Flowsheet Documentation  Taken 7/25/2023 0400 by Veto Emmanuel RN  Safety Promotion/Fall Prevention:   assistive device/personal items within reach   activity supervised   clutter free environment maintained   nonskid shoes/slippers when out of bed   safety round/check completed   room organization consistent  Taken 7/25/2023 0200 by Veto Emmanuel RN  Safety Promotion/Fall Prevention:   activity supervised   assistive device/personal items within reach   clutter free environment maintained   nonskid shoes/slippers when out of bed   room organization consistent   safety round/check completed  Taken 7/25/2023 0000 by Veto Emmanuel RN  Safety Promotion/Fall Prevention:   activity supervised   assistive device/personal items within reach   clutter free environment maintained   nonskid shoes/slippers when out of bed   room organization consistent   safety round/check completed  Taken 7/24/2023 2200 by Veto Emmanuel RN  Safety Promotion/Fall Prevention:   activity supervised   assistive device/personal items within reach   clutter free environment maintained   nonskid shoes/slippers when out of bed   room organization consistent   safety round/check completed  Taken 7/24/2023 2000 by Veto Emmanuel RN  Safety Promotion/Fall Prevention:   activity supervised   assistive device/personal items within reach   clutter free environment maintained   nonskid shoes/slippers when out of bed   room organization consistent   safety round/check completed     Problem: Infection  Goal: Absence of Infection Signs and Symptoms  Outcome: Ongoing, Progressing   Goal Outcome Evaluation:

## 2023-07-25 NOTE — PROGRESS NOTES
Met with patient to discuss home care and she is agreeable to [facility]. PCP is Dr Konstantin Gibson and per p/c to office he will follow for home care. Erik SHEIKH([facility])Hospital Liaison

## 2023-07-25 NOTE — PROGRESS NOTES
"  INFECTIOUS DISEASES  INPATIENT PROGRESS NOTE  2023      PATIENT NAME: Lissett Lowry  :  1950  MRN:  [id number]  Date of Admission:  2023      Antimicrobials:  Cefazoin 2 g iv q8h      MAR reviewed.    Chief Complaint   Patient presents with    Vomiting    Constipation       Reason for consultation:  MSSA bacteremia    Interval history:  JENNIFER today without vegetation.  No fevers.  No n/v.  Awaiting PICC.  Hopeful to go home today.    ROS:  No fevers/chills overnight.  No new rashes.  No SOB or chest pain.  Denies side effects from antimicrobials.      Objective:  Temp (24hrs), Av.4 °F (36.9 °C), Min:97.2 °F (36.2 °C), Max:98.9 °F (37.2 °C)    /83   Pulse 64   Temp 98.9 °F (37.2 °C) (Oral)   Resp 22   Ht 167.6 cm (66\")   Wt 82.6 kg (182 lb)   SpO2 94%   BMI 29.38 kg/m²     Physical Examination:  GENERAL: Improved appearing female.  Awake and alert, in no acute distress.   HEENT: Normocephalic, atraumatic.  EOMI. No conjunctival injection or subconjunctival hemorrhage.   CV: RRR. No murmur, rubs, gallops.  Normal S1S2.  LUNGS: Prior faint bilateral rhonchi, resolved. Normal respiratory effort.  ABDOMEN: +bowel sounds.  Soft, no TTP, less distended.  +hernia.  EXT:  No edema.    MSK: left arm with edema at IV infiltration site, +erythema and ecchymosis.  S/p left TKA, no inflammation.  SKIN: Warm and dry without rash or ulcer.  NEURO: A&Ox4. No focal deficits.  Face symmetric.  Speech fluent.  Moves all extremities well.   PSYCHIATRIC: Normal insight and judgement.  Cooperative.  Normal affect.    Laboratory Data:    Results from last 7 days   Lab Units 23  0410 23  0822 23  0354   WBC 10*3/mm3 6.46 5.24 6.85   HEMOGLOBIN g/dL 11.2* 11.0* 11.2*   HEMATOCRIT % 33.0* 33.0* 33.7*   PLATELETS 10*3/mm3 179 149 191       Results from last 7 days   Lab Units 23  0410   SODIUM mmol/L 143   POTASSIUM mmol/L 3.8   CHLORIDE mmol/L 107   CO2 mmol/L 26.0   BUN mg/dL 7* "   CREATININE mg/dL 0.61   GLUCOSE mg/dL 97   CALCIUM mg/dL 9.7       Results from last 7 days   Lab Units 07/20/23  0843   ALK PHOS U/L 74   BILIRUBIN mg/dL 0.6   ALT (SGPT) U/L 17   AST (SGOT) U/L 23               Estimated Creatinine Clearance: 89 mL/min (by C-G formula based on SCr of 0.61 mg/dL).  Results from last 7 days   Lab Units 07/20/23  1125   LACTATE mmol/L 1.4                       Microbiology:    Microbiology Results (last 10 days)       Procedure Component Value - Date/Time    Blood Culture - Blood, Hand, Left [[id number]]  (Normal) Collected: 07/22/23 1752    Lab Status: Preliminary result Specimen: Blood from Hand, Left Updated: 07/24/23 1831     Blood Culture No growth at 2 days    Blood Culture - Blood, Arm, Left [[id number]]  (Normal) Collected: 07/22/23 1752    Lab Status: Preliminary result Specimen: Blood from Arm, Left Updated: 07/24/23 1831     Blood Culture No growth at 2 days    Blood Culture - Blood, Arm, Left [[id number]]  (Abnormal)  (Susceptibility) Collected: 07/20/23 1226    Lab Status: Final result Specimen: Blood from Arm, Left Updated: 07/23/23 0605     Blood Culture Staphylococcus aureus     Comment:   Infectious disease consultation is highly recommended to rule out distant foci of infection.        Isolated from Aerobic and Anaerobic Bottles     Gram Stain Aerobic Bottle Gram positive cocci in groups      Anaerobic Bottle Gram positive cocci in groups    Susceptibility        Staphylococcus aureus      KENNEDI      Gentamicin Susceptible      Oxacillin Susceptible      Rifampin Susceptible      Vancomycin Susceptible                       Susceptibility Comments       Staphylococcus aureus    This isolate does not demonstrate inducible clindamycin resistance in vitro.                 Blood Culture ID, PCR - Blood, Arm, Left [[id number]]  (Abnormal) Collected: 07/20/23 1226    Lab Status: Final result Specimen: Blood from Arm, Left Updated: 07/21/23 0246     BCID, PCR Staph aureus.  mecA/C and MREJ (methicillin resistance gene) NOT detected. Identification by BCID2 PCR    Narrative:      Infectious disease consultation is highly recommended to rule out distant foci of infection.    Blood Culture - Blood, Arm, Right [[id number]]  (Normal) Collected: 07/20/23 1220    Lab Status: Preliminary result Specimen: Blood from Arm, Right Updated: 07/24/23 1245     Blood Culture No growth at 4 days              Radiology:  No radiology results for the last day    TTE:    Left ventricular systolic function is normal. Left ventricular ejection fraction appears to be 61 - 65%.    Moderate mitral annular calcification is present.  Cannot exclude a small adherent vegetation on the posterior surface of the mitral valve.  There is no significant mitral valve stenosis or regurgitation    Other cardiac valves appear normal with no evidence of vegetation    Recommend transesophageal echocardiogram for definitive assessment for endocarditis    JENNIFER:  prelim no vegetation, calcified MV    DISCUSSION:  73 y.o. female with history of large abdominal wall hernia admitted with small bowel obstruction.   Also noted to have patchy bilateral pulmonary infiltrates and leukocytosis.  Blood cultures with 1 set positive for MSSA.  Indwelling hardware of the left knee and lumbar spine.  No increased inflammation at the sites.     PROBLEM LIST:   -- MSSA bacteremia, unclear source.  Possibly pneumonia. TTE with no obvious vegetation but could not r/o on MV; JENNIFER with calcification but no vegetation.  Repeat blood cultures no growth.  Will need 4 weeks IV abx for complicated bacteremia in setting of indwelling orthopedic hardware.  -- Leukocytosis, resolved.  -- Patchy bilateral pulmonary opacities in setting of recent vomiting.  Consider aspiration.  -- Small bowel obstruction in setting of large abdominal wall hernia.  Resolved.  -- Acute kidney injury, resolved.  -- History of left knee arthroplasty, no inflammation at the site.   History of lumbar spinal hardware, no increased pain at the site.  -- Poor IV access.    PLAN:  -- Change cefazolin 2 g IV every 8 hours to ceftriaxone 2 g iv daily for ease of daily home infusion at discharge  -- Pending PICC  -- Home infusion consult for ceftriaxone 2 g iv daily via PICC through 8/19/23  -- Weekly visits at my office for labs and PICC care    Plan discussed with patient/family and nursing.    UM = 20 minutes    Durga Mckay MD  7/25/2023  12:43 EDT

## 2023-07-25 NOTE — CASE MANAGEMENT/SOCIAL WORK
Case Management Discharge Note      Final Note: Plan is home with [facility] SN/PT/OT. Spouse will transport. [facility] will supply the Cefazolin IV and will teach the patient and  and deliver to the patient's room. Patient has a rolling walker and rollator at home.    Provided Post Acute Provider List?: Yes  Post Acute Provider List: Home Health  Provided Post Acute Provider Quality & Resource List?: Yes  Post Acute Provider Quality and Resource List: Home Health  Delivered To: Support Person  Method of Delivery: In person    Selected Continued Care - Admitted Since 7/20/2023       Destination    No services have been selected for the patient.                Durable Medical Equipment    No services have been selected for the patient.                Dialysis/Infusion Coordination complete.      Service Provider Selected Services Address Phone Fax Patient Preferred    [facility] Infusion and IV Therapy [address], [place] [id number] [phone] --              Home Medical Care    No services have been selected for the patient.                Therapy    No services have been selected for the patient.                Community Resources    No services have been selected for the patient.                Community & DME    No services have been selected for the patient.                         Final Discharge Disposition Code: 06 - home with home health care

## 2023-07-25 NOTE — PLAN OF CARE
Goal Outcome Evaluation:  Plan of Care Reviewed With: patient        Progress: improving  Outcome Evaluation: patient ambulated 14' +14' with CGA x1 and rolling walker for support, tech assisted with IV pole. Verbal cues for walker placement and improvement in posture. Patient with c/o B LE weakness/shakiness limiting further distance. Completed B LE exercises at EOB. Recommend home with assist and HHPT.

## 2023-07-25 NOTE — CASE MANAGEMENT/SOCIAL WORK
Case Management Discharge Note      Final Note: Plan is home with [facility] SN/PT/OT. Spouse will transport. [facility] will supply the Rocephin IV and will teach the patient and  and deliver to the patient's room. Patient has a rolling walker and rollator at home. Kimmie with [facility] is working on the Rocephin.    Provided Post Acute Provider List?: Yes  Post Acute Provider List: Home Health  Provided Post Acute Provider Quality & Resource List?: Yes  Post Acute Provider Quality and Resource List: Home Health  Delivered To: Support Person  Method of Delivery: In person    Selected Continued Care - Admitted Since 7/20/2023       Destination    No services have been selected for the patient.                Durable Medical Equipment    No services have been selected for the patient.                Dialysis/Infusion Coordination complete.      Service Provider Selected Services Address Phone Fax Patient Preferred    [facility] Infusion and IV Therapy [address], [facility] [address] [phone] --              Home Medical Care    No services have been selected for the patient.                Therapy    No services have been selected for the patient.                Community Resources    No services have been selected for the patient.                Community & DME    No services have been selected for the patient.                         Final Discharge Disposition Code: 06 - home with home health care

## 2023-07-25 NOTE — CASE MANAGEMENT/SOCIAL WORK
Case Management Discharge Note      Final Note: Plan is home with [facility] SN/PT/OT. Spouse will transport. [facility] will supply the Rocephin IV and will teach the patient and  and deliver to the patient's room. Patient has a rolling walker and rollator at home. Kimmie with [facility] is working on the Rocephin.    Provided Post Acute Provider List?: Yes  Post Acute Provider List: Home Health  Provided Post Acute Provider Quality & Resource List?: Yes  Post Acute Provider Quality and Resource List: Home Health  Delivered To: Support Person  Method of Delivery: In person    Selected Continued Care - Admitted Since 7/20/2023       Destination    No services have been selected for the patient.                Durable Medical Equipment    No services have been selected for the patient.                Dialysis/Infusion Coordination complete.      Service Provider Selected Services Address Phone Fax Patient Preferred    [facility] In[facility] and IV Therapy [address] [facility] [id number] [phone] --              Home Medical Care Coordination complete.      Service Provider Selected Services Address Phone Fax Patient Preferred    [facility] ,  Home Nursing ,  Home Rehabilitation [address] [facility] [id number] [phone] --              Therapy    No services have been selected for the patient.                Community Resources    No services have been selected for the patient.                Community [facility]    No services have been selected for the patient.                         Final Discharge Disposition Code: 06 - home with home health care

## 2023-07-25 NOTE — CASE MANAGEMENT/SOCIAL WORK
Continued Stay Note  [facility]     Patient Name: Lissett Lowry  MRN: [id number]  Today's Date: 7/25/2023    Admit Date: 7/20/2023    Plan: Home with spouse   Discharge Plan       Row Name 07/25/23 0857       Plan    Plan Home with spouse    Patient/Family in Agreement with Plan yes    Plan Comments Spoke with patient at bedside. Plan is home with spouse. Patient is having a JENNIFER and PICC line placement  today. CM will continue to follow.    Final Discharge Disposition Code 01 - home or self-care                   Discharge Codes    No documentation.                 Expected Discharge Date and Time       Expected Discharge Date Expected Discharge Time    Jul 26, 2023               Tommie Rodriguez RN

## 2023-07-25 NOTE — DISCHARGE SUMMARY
[facility] Medicine Services  DISCHARGE SUMMARY    Patient Name: Lissett Lowry  : 1950  MRN: [id number]    Date of Admission: 2023  7:32 AM  Date of Discharge:  2023  Primary Care Physician: Konstantin Gibson MD    Consults       Date and Time Order Name Status Description    2023  9:16 AM Inpatient Infectious Diseases Consult Completed     2023  3:40 PM Inpatient General Surgery Consult Completed             Hospital Course     Presenting Problem: SBO    Active Hospital Problems    Diagnosis  POA    **SBO (small bowel obstruction) [K56.609]  Yes    Severe malnutrition [E43]  Yes    Sjogren syndrome, unspecified [M35.00]  Unknown    HTN (hypertension) [I10]  Yes    HLD (hyperlipidemia) [E78.5]  Yes    PHYLLIS on CPAP [G47.33, Z99.89]  Not Applicable    Controlled type 2 diabetes mellitus without complication, with long-term current use of insulin [E11.9, Z79.4]  Not Applicable      Resolved Hospital Problems   No resolved problems to display.          Hospital Course:  Lissett Lowry is a 73 y.o. female  w h/o large abdominal hernia, undergoing OP w/u for surgery w Dr FAYE, DMII, h/o PE  gary-op who presented after 2+ days of abdominal pain, dark emesis. Found to have SBO due to abdominal wall hernia. Pt also found to have RLL PNA and blood cultures ultimately came back positive for MSSA bacteremia.            SBO due to abdominal wall hernia  -surgery followed.   -having BMs.  SBFT somewhat delayed/?ileus, without gross obstruction.  Recs abdominal binder at all times.   Started clear liquids, then advanced to regular diet  -- held Xarelto in case emergent surgery was indicated.  Plan now is to proceed with outpatient follow up for surgery once infection clears. Resume Xarelto upon d/c      RLL PNA, likely aspiration in setting of significant vomiting  -Note has hives to cefadroxil which has side chain in common w unasyn + zosyn. -Continue  cephalosporin  -ID followed     MSSA bacteremia  -- from initial blood cultures  -- repeat blood cultures NGTD  --ID followed.  Recs cephalosporin, for at least 4 weeks IV abx.    --echo shows EF 61-65%, CAN NOT Exclude small vegetation on posterior surface of MV.  JENNIFER planned for today prior to d/c  -- PICC line also to be placed today      H/o PE 2021 - PE was perioperative in 2021, on low-dose xarelto since. Last dose 7/17 PM. Resume upon d/c (was held due to possible need for emergent surgery, however, as noted above, this will be delayed until she completes treatment for bacteremia)     KARLA  --resolved with IVF  --held bumex     Chronic bumex use - family denies h/o CHF but on high dose bumex. Held on admission, resume upon d/c     DMII with hypoglycemia  - held ozempic. Low-dose  SSI while inpatient  -- resume prior regimen upon d/c (not sure why this is not on her med rec, but she reported taking it)       Sjogren - no longer taking Plaquel at home  HLD - statin  HTN - continue nebivolol and other home meds upon d/c  Mood disorder - home sertraline  Multiple listed medication allergies - complicates care, consider allergy referral as OP         Discharge Follow Up Recommendations for outpatient labs/diagnostics:   Follow up with PCP within one week  Follow up with General Surgery/Dr. FAYE as per Dr. Vale  Follow up with ID/Dr. Mckay as per her instructions    Day of Discharge     HPI:   Doing well this am, no new issues overnight.  at bedside.     Review of Systems  Gen- No fevers, chills  CV- No chest pain, palpitations  Resp- No cough, dyspnea  GI- No N/V/D, abd pain      Vital Signs:   Temp:  [97.2 °F (36.2 °C)-98.9 °F (37.2 °C)] 98.9 °F (37.2 °C)  Heart Rate:  [58-85] 64  Resp:  [10-25] 22  BP: (143-177)/(69-94) 158/83      Physical Exam:  Constitutional: No acute distress, awake, alert,  at bedside  HENT: NCAT, mucous membranes moist  Respiratory: Clear to auscultation bilaterally,  respiratory effort normal   Cardiovascular: RRR, no murmurs, rubs, or gallops  Gastrointestinal: Positive bowel sounds, soft, nontender, nondistended, large reducible left ventral hernia  Musculoskeletal: No bilateral ankle edema  Psychiatric: Appropriate affect, cooperative  Neurologic: Oriented x 3, strength symmetric in all extremities, Cranial Nerves grossly intact to confrontation, speech clear  Skin: No rashes       Pertinent  and/or Most Recent Results     LAB RESULTS:      Lab 07/25/23  0410 07/22/23  0822 07/21/23  0354 07/20/23  1125 07/20/23  0843   WBC 6.46 5.24 6.85  --  16.30*   HEMOGLOBIN 11.2* 11.0* 11.2*  --  14.8   HEMATOCRIT 33.0* 33.0* 33.7*  --  44.2   PLATELETS 179 149 191  --  294   NEUTROS ABS 4.77  --   --   --  14.58*   IMMATURE GRANS (ABS) 0.19*  --   --   --  0.11*   LYMPHS ABS 0.89  --   --   --  0.83   MONOS ABS 0.58  --   --   --  0.77   EOS ABS 0.00  --   --   --  0.00   MCV 92.7 96.2 94.7  --  93.6   LACTATE  --   --   --  1.4 3.1*         Lab 07/25/23  0410 07/24/23  0329 07/24/23  0003 07/23/23  1221 07/23/23  0353 07/22/23  2216 07/22/23  1752 07/22/23  0822 07/21/23  1550 07/21/23  0354   SODIUM 143 141  --   --  148*  --   --  144  --  144   POTASSIUM 3.8 3.9  --   --  4.2  --  3.8 3.6   < > 3.6   CHLORIDE 107 108*  --   --  114*  --   --  110*  --  105   CO2 26.0 24.0  --   --  23.0  --   --  26.0  --  29.0   ANION GAP 10.0 9.0  --   --  11.0  --   --  8.0  --  10.0   BUN 7* 8  --   --  16  --   --  27*  --  64*   CREATININE 0.61 0.59  --   --  0.57  --   --  0.66  --  1.36*   EGFR 94.5 95.3  --   --  96.1  --   --  92.8  --  41.2*   GLUCOSE 97 105*  --   --  113*  --   --  79  --  100*   CALCIUM 9.7 9.5  --   --  9.8  --   --  9.9  --  9.9   MAGNESIUM  --   --  2.3  --  1.5*  --   --  1.7  --   --    PHOSPHORUS  --   --   --  2.3*  --  1.5* 1.4* 1.4*  --   --     < > = values in this interval not displayed.         Lab 07/20/23  0843   TOTAL PROTEIN 7.7   ALBUMIN 4.4    GLOBULIN 3.3   ALT (SGPT) 17   AST (SGOT) 23   BILIRUBIN 0.6   ALK PHOS 74   LIPASE 15         Lab 07/20/23  1125 07/20/23  0843   HSTROP T 30* 35*                 Brief Urine Lab Results  (Last result in the past 365 days)        Color   Clarity   Blood   Leuk Est   Nitrite   Protein   CREAT   Urine HCG        07/20/23 1209 Dark Yellow   Cloudy   Negative   Moderate (2+)   Negative   Trace                 Microbiology Results (last 10 days)       Procedure Component Value - Date/Time    Blood Culture - Blood, Hand, Left [[id number]]  (Normal) Collected: 07/22/23 1752    Lab Status: Preliminary result Specimen: Blood from Hand, Left Updated: 07/24/23 1831     Blood Culture No growth at 2 days    Blood Culture - Blood, Arm, Left [[id number]]  (Normal) Collected: 07/22/23 1752    Lab Status: Preliminary result Specimen: Blood from Arm, Left Updated: 07/24/23 1831     Blood Culture No growth at 2 days    Blood Culture - Blood, Arm, Left [[id number]]  (Abnormal)  (Susceptibility) Collected: 07/20/23 1226    Lab Status: Final result Specimen: Blood from Arm, Left Updated: 07/23/23 0605     Blood Culture Staphylococcus aureus     Comment:   Infectious disease consultation is highly recommended to rule out distant foci of infection.        Isolated from Aerobic and Anaerobic Bottles     Gram Stain Aerobic Bottle Gram positive cocci in groups      Anaerobic Bottle Gram positive cocci in groups    Susceptibility        Staphylococcus aureus      KENNEDI      Gentamicin Susceptible      Oxacillin Susceptible      Rifampin Susceptible      Vancomycin Susceptible                       Susceptibility Comments       Staphylococcus aureus    This isolate does not demonstrate inducible clindamycin resistance in vitro.                 Blood Culture ID, PCR - Blood, Arm, Left [[id number]]  (Abnormal) Collected: 07/20/23 1226    Lab Status: Final result Specimen: Blood from Arm, Left Updated: 07/21/23 0246     BCID, PCR Staph aureus. mecA/C  and MREJ (methicillin resistance gene) NOT detected. Identification by BCID2 PCR    Narrative:      Infectious disease consultation is highly recommended to rule out distant foci of infection.    Blood Culture - Blood, Arm, Right [[id number]]  (Normal) Collected: 07/20/23 1220    Lab Status: Final result Specimen: Blood from Arm, Right Updated: 07/25/23 1245     Blood Culture No growth at 5 days            CT Abdomen Pelvis Without Contrast    Result Date: 7/20/2023  CT ABDOMEN PELVIS WO CONTRAST Date of Exam: 7/20/2023 10:41 AM EDT Indication: abd pain, n/v. Comparison: 5/30/2023 Technique: Axial CT images were obtained of the abdomen and pelvis without the administration of contrast. Reconstructed coronal and sagittal images were also obtained. Automated exposure control and iterative construction methods were used. Findings: There is new incompletely imaged fairly extensive infiltrate of the right middle and right lower lobes. There are no pleural effusions. The stomach is markedly dilated and contains fluid, debris, and air. The stomach was only mildly distended on the prior exam. There are cholecystectomy clips. The pancreas and right adrenal gland appear normal. Small left adrenal lesion is compatible with an adenoma. There are no renal stones or hydronephrosis. 2 small hypodense left renal lesions likely represent proteinaceous or milk of calcium cysts. There is new moderately severe dilatation of small bowel loops to the level of an anterior abdominal wall hernia which contains dilated small bowel and peritoneal fat. The small bowel distal to the hernia is decompressed. There are multiple surgical clips and sutures in the right lower quadrant. The colon is normal in size. There is diverticulosis without acute diverticulitis. There is a second low right anterior abdominal wall hernia containing nondilated small bowel.     Impression: Findings are compatible with small bowel obstruction, caused by lower  anterior abdominal wall hernia. Gross gastric distention. Second hernia is present containing nonobstructed small bowel. Incompletely imaged right middle and right lower lobe lung infiltrates are suspicious for pneumonia. Electronically Signed: Lissett Rhodes MD  7/20/2023 10:55 AM EDT  Workstation ID: [id number]    CT Chest Without Contrast Diagnostic    Result Date: 7/20/2023  CT CHEST WO CONTRAST DIAGNOSTIC Date of Exam: 7/20/2023 10:41 AM EDT Indication: abd pain, hypoxia. Comparison: None available. Technique: Axial CT images were obtained of the chest without contrast administration.  Reconstructed coronal and sagittal images were also obtained. Automated exposure control and iterative construction methods were used. Findings: There is no pathologic axillary adenopathy or other worrisome body wall soft tissue finding in the chest. There is no pleural or pericardial effusion. Atherosclerotic, nonaneurysmal thoracic aorta. Small, likely reactive right greater than left hilar lymph nodes are present, incompletely characterized on this noncontrast exam. Evaluation of the osseous structures demonstrates no evidence of acute fracture or aggressive osseous lesion, with multilevel spondylosis change present. The lung fields and straight multifocal areas of peribronchial consolidation and groundglass opacity, most notable within the right middle lobe and right lower lobe.     Impression: Areas of parabronchial consolidation and groundglass opacity are present within the right middle lobe and right lower lobe, with minimal left lung base involvement, most consistent with multifocal pneumonia. Electronically Signed: Tyrone eCja  7/20/2023 10:57 AM EDT  Workstation ID: [id number]    Adult Transthoracic Echo Limited W/ Cont if Necessary Per Protocol    Result Date: 7/21/2023    Left ventricular systolic function is normal. Left ventricular ejection fraction appears to be 61 - 65%.   Moderate mitral annular calcification is  present.  Cannot exclude a small adherent vegetation on the posterior surface of the mitral valve.  There is no significant mitral valve stenosis or regurgitation   Other cardiac valves appear normal with no evidence of vegetation   Recommend transesophageal echocardiogram for definitive assessment for endocarditis     FL Small Bowel Follow Through Single-Contrast    Result Date: 7/22/2023  FL SMALL BOWEL FOLLOW THROUGH SINGLE-CONTRAST Date of Exam: 7/22/2023 10:00 AM EDT Indication: sbo. Comparison: None available. Technique:   image of the abdomen was obtained. Patient ingested medium density barium for single contrast imaging of the small bowel.  Examination was recorded with multiple large field of view radiographs and spot fluoroscopic images. Findings: Initial  view of the abdomen demonstrates gas-filled segments of both small and large bowel, without evidence of obstruction. A nasogastric tube is noted in the stomach. There is no overt pneumoperitoneum. Water-soluble contrast was subsequently administered via the nasogastric tube. Subsequent 30-minute, 2-hour and 4-hour images demonstrate distal progression of contrast, with right colon visualized on the 4-hour image. There is no extravasation of contrast.     Impression: Somewhat delayed transit of contrast with gas-filled segments of both small and large bowel suggesting ileus. No evidence of high-grade obstruction. Electronically Signed: Tyrone Ceja  7/22/2023 7:34 PM EDT  Workstation ID: [id number]    XR Abdomen KUB    Result Date: 7/20/2023  XR ABDOMEN KUB Date of Exam: 7/20/2023 1:15 PM EDT Indication: NG placement Comparison: None available. Findings: NG tube within the stomach. There is mild bibasilar opacities either atelectasis or pneumonia. There are mildly dilated loops of bowel within the visualized abdomen.     Impression: 1. NG tube within the stomach. Electronically Signed: Kofi Phelps  7/20/2023 1:31 PM EDT  Workstation ID:  [id number]             Results for orders placed during the hospital encounter of 07/20/23    Adult Transthoracic Echo Limited W/ Cont if Necessary Per Protocol    Interpretation Summary    Left ventricular systolic function is normal. Left ventricular ejection fraction appears to be 61 - 65%.    Moderate mitral annular calcification is present.  Cannot exclude a small adherent vegetation on the posterior surface of the mitral valve.  There is no significant mitral valve stenosis or regurgitation    Other cardiac valves appear normal with no evidence of vegetation    Recommend transesophageal echocardiogram for definitive assessment for endocarditis      Plan for Follow-up of Pending Labs/Results:   Pending Labs       Order Current Status    Blood Culture - Blood, Arm, Left Preliminary result    Blood Culture - Blood, Hand, Left Preliminary result          Discharge Details        Discharge Medications        New Medications        Instructions Start Date   cefTRIAXone  Commonly known as: ROCEPHIN   2,000 mg, Intravenous, Every 24 Hours Scheduled   Start Date: July 26, 2023            Continue These Medications        Instructions Start Date   acetaminophen 650 MG 8 hr tablet  Commonly known as: TYLENOL   650 mg, Oral, 2 Times Daily      acetaminophen 500 MG tablet  Commonly known as: TYLENOL   500 mg, Oral, Daily      AMLODIPINE BESYLATE PO   10 mg, Oral, Daily      BUMEX PO   2 mg, Oral, Daily      febuxostat 40 MG tablet  Commonly known as: ULORIC   40 mg, Oral, Daily      nebivolol 10 MG tablet  Commonly known as: BYSTOLIC   10 mg, Oral, Daily      oxybutynin XL 5 MG 24 hr tablet  Commonly known as: DITROPAN-XL   5 mg, Oral, Daily      pregabalin 100 MG capsule  Commonly known as: LYRICA   100 mg, Oral, 2 Times Daily      Rivaroxaban 2.5 MG tablet  Commonly known as: XARELTO   2.5 mg, Oral, Nightly      sertraline 100 MG tablet  Commonly known as: ZOLOFT   100 mg, Oral, Daily             Stop These Medications   "    famotidine 20 MG tablet  Commonly known as: PEPCID     fenofibrate micronized 134 MG capsule  Commonly known as: LOFIBRA     hydroxychloroquine 200 MG tablet  Commonly known as: PLAQUENIL     Stool Softener 100 MG capsule  Generic drug: docusate sodium            ASK your doctor about these medications        Instructions Start Date   rosuvastatin 5 MG tablet  Commonly known as: CRESTOR   5 mg, Daily               Allergies   Allergen Reactions    Duricef [Cefadroxil] Hives     Has tolerated ceftriaxone, cefazolin    Erythromycin Hives    Duract [Bromfenac] Hives    Lamisil [Terbinafine] Other (See Comments)     \"IT CAUSED chemically induced LUPUS\"    Lasix [Furosemide] Other (See Comments)     PT STATES \"IM JUST VERY SENSITIVE TO IT.\"    Mevacor [Lovastatin] Other (See Comments)     PT REPORTS \"IT JUST MADE ME FEEL SICK, I COULDN'T TOLERATE IT NOTHING SPECIFIC.\"    Sulfa Antibiotics Hives    Sulfanilamide     Zocor [Simvastatin] Other (See Comments)     \"IT JUST MADE ME FEEL SICK, I COULDN'T TOLERATE IT. NOTHING SPECIFIC.\"           Discharge Disposition:  Home or Self Care    Diet:  Hospital:  Diet Order   Procedures    Diet: Cardiac Diets, Gastrointestinal Diets; Healthy Heart (2-3 Na+); Fiber-Restricted; Texture: Regular Texture (IDDSI 7); Fluid Consistency: Thin (IDDSI 0)       Activity:  Activity Instructions    As tolerated           Restrictions or Other Recommendations:         CODE STATUS:    Code Status and Medical Interventions:   Ordered at: 07/20/23 1417     Level Of Support Discussed With:    Patient    Next of Kin (If No Surrogate)     Code Status (Patient has no pulse and is not breathing):    CPR (Attempt to Resuscitate)     Medical Interventions (Patient has pulse or is breathing):    Full Support     Release to patient:    Routine Release       Future Appointments   Date Time Provider Department Center   8/9/2023 10:00 AM Harriett Matamoros APRN [facility] None       Additional " Instructions for the Follow-ups that You Need to Schedule       Ambulatory Referral to Home Health   As directed      Face to Face Visit Date: 7/25/2023    Follow-up provider for Plan of Care?: I will be treating the patient on an ongoing basis.  Please send me the Plan of Care for signature.    Follow-up provider: LM CUTLER [[id number]]    Reason/Clinical Findings: Small bowel obstruction    Describe mobility limitations that make leaving home difficult: Impaired mobility    Nursing/Therapeutic Services Requested: Skilled Nursing Physical Therapy Occupational Therapy    Skilled nursing orders: Medication education Pain management Infusion therapy    PT orders: Therapeutic exercise Gait Training Strengthening Home safety assessment    Weight Bearing Status: As Tolerated    Occupational orders: Activities of daily living Energy conservation Strengthening Home safety assessment    Frequency: 1 Week 1         Discharge Follow-up with PCP   As directed       Currently Documented PCP:    Lm Cutler MD    PCP Phone Number:    [phone]     Follow Up Details: in one week with PCP         Discharge Follow-up with Specified Provider: General Surgery/Dr. FAYE; 2 Weeks   As directed      To: General Surgery/Dr. FAYE    Follow Up: 2 Weeks         Discharge Follow-up with Specified Provider: Infectious Disease/Dr. Mckay   As directed      To: Infectious Disease/Dr. Mckay    Follow Up Details: per her instructions                       Adrianne Riggins MD  07/25/23      Time Spent on Discharge:  I spent  35  minutes on this discharge activity which included: face-to-face encounter with the patient, reviewing the data in the system, coordination of the care with the nursing staff as well as consultants, documentation, and entering orders.

## 2023-07-26 ENCOUNTER — HOME CARE VISIT (OUTPATIENT)
Dept: HOME HEALTH SERVICES | Facility: HOME HEALTHCARE | Age: 73
End: 2023-07-26
Payer: MEDICARE

## 2023-07-26 PROCEDURE — G0299 HHS/HOSPICE OF RN EA 15 MIN: HCPCS

## 2023-07-26 NOTE — HOME HEALTH
SOC Note:    Home Health ordered for: disciplines SN    Reason for Hosp/Primary Dx/Co-morbidities: Pneumonia/staph infection in blood    Focus of Care: Pnumonia/staph infection/IV antibiotics    Patient's goal(s): To finish up these antibiotics and get back to life    Current Functional status/mobility/DME: uses a walker    HB status/Living Arrangements: lives with     Skin Integrity/wound status: no open areas    Code Status: CPR    Fall Risk/Safety concerns: patient is weak    Medication issues/Concerns: none    Patient and  say she will be seeing infectious disease on Wednesdays each week and they want to change the PICC line dressing and draw any labs needed at that time.   But they do want us to come weekly to be reinforcement for education and IV antibiotic teaching.    Additional Problems/Concerns: none    SDOH Barriers (i.e. caregiver concerns, social isolation, transportation, food insecurity, environment, income etc.)/Need for MSW: no    Plan for next visit: IV teaching/disease process education/medication education

## 2023-07-27 LAB
BACTERIA SPEC AEROBE CULT: NORMAL
BACTERIA SPEC AEROBE CULT: NORMAL

## 2023-07-28 ENCOUNTER — HOME CARE VISIT (OUTPATIENT)
Dept: HOME HEALTH SERVICES | Facility: HOME HEALTHCARE | Age: 73
End: 2023-07-28
Payer: MEDICARE

## 2023-07-28 PROCEDURE — G0152 HHCP-SERV OF OT,EA 15 MIN: HCPCS

## 2023-07-30 VITALS
HEART RATE: 79 BPM | SYSTOLIC BLOOD PRESSURE: 110 MMHG | OXYGEN SATURATION: 97 % | RESPIRATION RATE: 16 BRPM | DIASTOLIC BLOOD PRESSURE: 65 MMHG

## 2023-07-30 NOTE — CASE COMMUNICATION
Patient missed a PT evaluation visit from [facility] on 7/28/2023.    Reason: No return call to schedule PT evaluation.  Will attempt again next week.       For your records only.   As per home health protocol, MD must be notified of missed/cancelled visits; therefore the prescribed frequency was not met.

## 2023-07-31 ENCOUNTER — HOME CARE VISIT (OUTPATIENT)
Dept: HOME HEALTH SERVICES | Facility: HOME HEALTHCARE | Age: 73
End: 2023-07-31
Payer: MEDICARE

## 2023-07-31 VITALS
RESPIRATION RATE: 18 BRPM | SYSTOLIC BLOOD PRESSURE: 128 MMHG | OXYGEN SATURATION: 97 % | DIASTOLIC BLOOD PRESSURE: 80 MMHG | HEART RATE: 79 BPM | TEMPERATURE: 98.4 F

## 2023-07-31 PROCEDURE — G0299 HHS/HOSPICE OF RN EA 15 MIN: HCPCS

## 2023-08-03 ENCOUNTER — READMISSION MANAGEMENT (OUTPATIENT)
Dept: CALL CENTER | Facility: HOSPITAL | Age: 73
End: 2023-08-03
Payer: MEDICARE

## 2023-08-03 ENCOUNTER — HOME CARE VISIT (OUTPATIENT)
Dept: HOME HEALTH SERVICES | Facility: HOME HEALTHCARE | Age: 73
End: 2023-08-03
Payer: MEDICARE

## 2023-08-03 PROCEDURE — G0152 HHCP-SERV OF OT,EA 15 MIN: HCPCS

## 2023-08-04 ENCOUNTER — HOME CARE VISIT (OUTPATIENT)
Dept: HOME HEALTH SERVICES | Facility: HOME HEALTHCARE | Age: 73
End: 2023-08-04
Payer: MEDICARE

## 2023-08-04 PROCEDURE — G0151 HHCP-SERV OF PT,EA 15 MIN: HCPCS

## 2023-08-05 VITALS
TEMPERATURE: 98.1 F | SYSTOLIC BLOOD PRESSURE: 134 MMHG | OXYGEN SATURATION: 96 % | HEART RATE: 70 BPM | DIASTOLIC BLOOD PRESSURE: 72 MMHG | RESPIRATION RATE: 17 BRPM

## 2023-08-07 ENCOUNTER — HOME CARE VISIT (OUTPATIENT)
Dept: HOME HEALTH SERVICES | Facility: HOME HEALTHCARE | Age: 73
End: 2023-08-07
Payer: MEDICARE

## 2023-08-07 VITALS
SYSTOLIC BLOOD PRESSURE: 110 MMHG | DIASTOLIC BLOOD PRESSURE: 70 MMHG | TEMPERATURE: 98.2 F | HEART RATE: 67 BPM | OXYGEN SATURATION: 99 %

## 2023-08-07 PROCEDURE — G0299 HHS/HOSPICE OF RN EA 15 MIN: HCPCS

## 2023-08-07 NOTE — HOME HEALTH
SNV for PICC line antibiotic education, disease process education. No issues or concerns noted with PICC line and no problems with administering the IV antibiotics.   Will plan to see as scheduled next week.

## 2023-08-11 ENCOUNTER — HOME CARE VISIT (OUTPATIENT)
Dept: HOME HEALTH SERVICES | Facility: HOME HEALTHCARE | Age: 73
End: 2023-08-11
Payer: MEDICARE

## 2023-08-11 PROCEDURE — G0152 HHCP-SERV OF OT,EA 15 MIN: HCPCS

## 2023-08-14 ENCOUNTER — HOME CARE VISIT (OUTPATIENT)
Dept: HOME HEALTH SERVICES | Facility: HOME HEALTHCARE | Age: 73
End: 2023-08-14
Payer: MEDICARE

## 2023-08-14 VITALS — OXYGEN SATURATION: 98 % | SYSTOLIC BLOOD PRESSURE: 122 MMHG | HEART RATE: 74 BPM | DIASTOLIC BLOOD PRESSURE: 88 MMHG

## 2023-08-14 VITALS
SYSTOLIC BLOOD PRESSURE: 117 MMHG | DIASTOLIC BLOOD PRESSURE: 69 MMHG | OXYGEN SATURATION: 99 % | HEART RATE: 70 BPM | TEMPERATURE: 98.2 F

## 2023-08-14 PROCEDURE — G0299 HHS/HOSPICE OF RN EA 15 MIN: HCPCS

## 2023-08-14 NOTE — HOME HEALTH
SNV for cardiopulmonary assessment. PICC line dressing clean, dry, intact. Medication education and infusion education completed during visit. No issures or concerns. PICC line is supposed to be removed on Friday at her infusion appointment. Will plan to see again as scheduled. Patient is scheduled to have hernia repair surgery on 8.29.23 should be an outpatient or maybe a one night stay and patient will return home to resume HH services as currently scheduled.

## 2023-08-14 NOTE — HOME HEALTH
Routine Visit Note:  Pt/Cg home.  Pt agreeable to session today.  Pt ansers door w/o AMB assist of person or device.  Pt alert and oriented.  Pt particpated in TE/TA today it increase endurance and stength.  Pt reports no new falls, no med changes, no changes to insurance.     Skill/education provided: Te/TA seated and in standing.  No overhead.  HEP education, vitals PRN per agency guidelines, AMB, providede basket for walker.  Pt will have sx later this month.  OT to remain in.  Pt demo'ed tub and toilet transfer today.      Patient/caregiver response: alert and engaged    Plan for next visit: ADL ie simple meal prep    Other pertinent info: cont OT 1x per week

## 2023-08-17 ENCOUNTER — HOME CARE VISIT (OUTPATIENT)
Dept: HOME HEALTH SERVICES | Facility: HOME HEALTHCARE | Age: 73
End: 2023-08-17
Payer: MEDICARE

## 2023-08-17 VITALS
DIASTOLIC BLOOD PRESSURE: 78 MMHG | RESPIRATION RATE: 16 BRPM | SYSTOLIC BLOOD PRESSURE: 133 MMHG | OXYGEN SATURATION: 94 % | HEART RATE: 95 BPM

## 2023-08-17 PROCEDURE — G0152 HHCP-SERV OF OT,EA 15 MIN: HCPCS

## 2023-08-18 NOTE — HOME HEALTH
Pt seen today for regular OT visit in home.  CG present.  No new falls, no pain, no insurance changes.  Pt tolerates UE TE/TA seated and in standing, strength and endurance wx.  No LOB or breaks due ot exertion, OT kept at 35 reps and under 5 mins in standing GMC wx. COnt OT per POC, Vitals Prn per agency guidelines.    Next visit will be placed on hold or transfer to to upcoming hernia sx on 8/29/2023.  Pt is agreeable.      No DME needs at this time.

## 2023-08-21 ENCOUNTER — HOME CARE VISIT (OUTPATIENT)
Dept: HOME HEALTH SERVICES | Facility: HOME HEALTHCARE | Age: 73
End: 2023-08-21
Payer: MEDICARE

## 2023-08-21 VITALS
HEART RATE: 72 BPM | DIASTOLIC BLOOD PRESSURE: 71 MMHG | TEMPERATURE: 98 F | OXYGEN SATURATION: 99 % | SYSTOLIC BLOOD PRESSURE: 115 MMHG

## 2023-08-21 PROCEDURE — G0299 HHS/HOSPICE OF RN EA 15 MIN: HCPCS

## 2023-08-21 NOTE — HOME HEALTH
SNV for cardipulmonary assessment, medication education. Patient will be having surgery for  hernia repair on tuesday of next week but it is scheduled as an outpatient procedure.   No issues or concerns at this time. Will plan to see next week as scheduled. PICC line was removed on 8.18.23 at her doctor appt.

## 2023-08-22 ENCOUNTER — HOME CARE VISIT (OUTPATIENT)
Dept: HOME HEALTH SERVICES | Facility: HOME HEALTHCARE | Age: 73
End: 2023-08-22
Payer: MEDICARE

## 2023-08-22 PROCEDURE — G0152 HHCP-SERV OF OT,EA 15 MIN: HCPCS

## 2023-08-23 VITALS
RESPIRATION RATE: 16 BRPM | HEART RATE: 94 BPM | DIASTOLIC BLOOD PRESSURE: 86 MMHG | SYSTOLIC BLOOD PRESSURE: 148 MMHG | OXYGEN SATURATION: 96 %

## 2023-08-24 ENCOUNTER — HOME CARE VISIT (OUTPATIENT)
Dept: HOME HEALTH SERVICES | Facility: HOME HEALTHCARE | Age: 73
End: 2023-08-24
Payer: MEDICARE

## 2023-08-24 NOTE — HOME HEALTH
Pt presents at door to great OT w/o assist, no LOB observed.  PT is oriented and in good spirits.  PT vitals assessed PRN per agency guidelines.  Pt is alert and oreitned.  Pt agreeable to TX today.  Pt participates in seated strengthening TE/TA to include bar use UEs mx planes/reps, no holds.  Pt completes GMC also seated.  Pt uses  tool R/L.  AMB in home w/o assist today. Pt able to stand and complete arm bike on lowest tension setting mins.  CG may bring BSC over toliet in prep for upcoming surgery.  Pt tolerated todays session well.  Pt will benefit from cont'ed OT.  Plan to place pt on hold as hernia sx may be outpt, RN aware and Pt/CG agreeable.  OT 1x per wk.

## 2023-08-25 ENCOUNTER — PRE-ADMISSION TESTING (OUTPATIENT)
Dept: PREADMISSION TESTING | Facility: HOSPITAL | Age: 73
End: 2023-08-25
Payer: MEDICARE

## 2023-08-25 VITALS — BODY MASS INDEX: 29.62 KG/M2 | HEIGHT: 66 IN | WEIGHT: 184.3 LBS

## 2023-08-25 LAB
ANION GAP SERPL CALCULATED.3IONS-SCNC: 9 MMOL/L (ref 5–15)
BUN SERPL-MCNC: 19 MG/DL (ref 8–23)
BUN/CREAT SERPL: 24.7 (ref 7–25)
CALCIUM SPEC-SCNC: 10.5 MG/DL (ref 8.6–10.5)
CHLORIDE SERPL-SCNC: 103 MMOL/L (ref 98–107)
CO2 SERPL-SCNC: 30 MMOL/L (ref 22–29)
CREAT SERPL-MCNC: 0.77 MG/DL (ref 0.57–1)
DEPRECATED RDW RBC AUTO: 48 FL (ref 37–54)
EGFRCR SERPLBLD CKD-EPI 2021: 81.6 ML/MIN/1.73
ERYTHROCYTE [DISTWIDTH] IN BLOOD BY AUTOMATED COUNT: 13.5 % (ref 12.3–15.4)
GLUCOSE SERPL-MCNC: 77 MG/DL (ref 65–99)
HBA1C MFR BLD: 4.7 % (ref 4.8–5.6)
HCT VFR BLD AUTO: 39.3 % (ref 34–46.6)
HGB BLD-MCNC: 12.8 G/DL (ref 12–15.9)
INR PPP: 1.06 (ref 0.89–1.12)
MCH RBC QN AUTO: 31.2 PG (ref 26.6–33)
MCHC RBC AUTO-ENTMCNC: 32.6 G/DL (ref 31.5–35.7)
MCV RBC AUTO: 95.9 FL (ref 79–97)
PLATELET # BLD AUTO: 247 10*3/MM3 (ref 140–450)
PMV BLD AUTO: 10.7 FL (ref 6–12)
POTASSIUM SERPL-SCNC: 3.9 MMOL/L (ref 3.5–5.2)
PROTHROMBIN TIME: 13.9 SECONDS (ref 12.2–14.5)
RBC # BLD AUTO: 4.1 10*6/MM3 (ref 3.77–5.28)
SODIUM SERPL-SCNC: 142 MMOL/L (ref 136–145)
WBC NRBC COR # BLD: 5.21 10*3/MM3 (ref 3.4–10.8)

## 2023-08-25 PROCEDURE — 36415 COLL VENOUS BLD VENIPUNCTURE: CPT

## 2023-08-25 PROCEDURE — 93005 ELECTROCARDIOGRAM TRACING: CPT

## 2023-08-25 PROCEDURE — 80048 BASIC METABOLIC PNL TOTAL CA: CPT

## 2023-08-25 PROCEDURE — 83036 HEMOGLOBIN GLYCOSYLATED A1C: CPT

## 2023-08-25 PROCEDURE — 85027 COMPLETE CBC AUTOMATED: CPT

## 2023-08-25 PROCEDURE — 85610 PROTHROMBIN TIME: CPT

## 2023-08-25 RX ORDER — EPINEPHRINE 0.3 MG/.3ML
0.3 INJECTION SUBCUTANEOUS ONCE
COMMUNITY
Start: 2023-07-25

## 2023-08-25 NOTE — PAT
An arrival time for procedure was not provided during PAT visit. If patient had any questions or concerns about their arrival time, they were instructed to contact their surgeon/physician.  Additionally, if the patient referred to an arrival time that was acquired from their my chart account, patient was encouraged to verify that time with their surgeon/physician. Arrival times are NOT provided in Pre Admission Testing Department.    Patient viewed general PAT education video as instructed in their preoperative information received from their surgeon.  Patient stated the general PAT education video was viewed in its entirety and survey completed.  Copies of PAT general education handouts (Incentive Spirometry, Meds to Beds Program, Patient Belongings, Pre-op skin preparation instructions, Blood Glucose testing, Visitor policy, Surgery FAQ, Code H) distributed to patient if not printed. Education related to the PAT pass and skin preparation for surgery (if applicable) completed in PAT as a reinforcement to PAT education video. Patient instructed to return PAT pass provided today as well as completed skin preparation sheet (if applicable) on the day of procedure.     Additionally if patient had not viewed video yet but intended to view it at home or in our waiting area, then referred them to the handout with QR code/link provided during PAT visit.  Instructed patient to complete survey after viewing the video in its entirety.  Encouraged patient/family to read PAT general education handouts thoroughly and notify PAT staff with any questions or concerns. Patient verbalized understanding of all information and priority content.    Patient to apply Chlorhexadine wipes  to surgical area (as instructed) the night before procedure and the AM of procedure. Wipes provided.    Patient denies any current skin issues.     Patient instructed to bring CPAP mask and tubing to the hospital for overnight stay.  Explained that it is  not necessary to bring their CPAP machine to the hospital instead a CPAP machine will be provided for use by the hospital. If patient knows their CPAP settings, those settings will be implemented.  If not, the CPAP machine will be utilized on the auto setting using their mask and tubing.    Patient verbalized understanding.

## 2023-08-28 ENCOUNTER — HOME CARE VISIT (OUTPATIENT)
Dept: HOME HEALTH SERVICES | Facility: HOME HEALTHCARE | Age: 73
End: 2023-08-28
Payer: MEDICARE

## 2023-08-28 ENCOUNTER — ANESTHESIA EVENT (OUTPATIENT)
Dept: PERIOP | Facility: HOSPITAL | Age: 73
End: 2023-08-28
Payer: MEDICARE

## 2023-08-28 VITALS
OXYGEN SATURATION: 99 % | SYSTOLIC BLOOD PRESSURE: 121 MMHG | DIASTOLIC BLOOD PRESSURE: 72 MMHG | TEMPERATURE: 98 F | HEART RATE: 71 BPM

## 2023-08-28 VITALS
OXYGEN SATURATION: 96 % | RESPIRATION RATE: 16 BRPM | DIASTOLIC BLOOD PRESSURE: 68 MMHG | SYSTOLIC BLOOD PRESSURE: 110 MMHG | HEART RATE: 74 BPM

## 2023-08-28 LAB
QT INTERVAL: 440 MS
QTC INTERVAL: 450 MS

## 2023-08-28 PROCEDURE — G0152 HHCP-SERV OF OT,EA 15 MIN: HCPCS

## 2023-08-28 PROCEDURE — G0299 HHS/HOSPICE OF RN EA 15 MIN: HCPCS

## 2023-08-28 RX ORDER — FAMOTIDINE 10 MG/ML
20 INJECTION, SOLUTION INTRAVENOUS ONCE
Status: CANCELLED | OUTPATIENT
Start: 2023-08-28 | End: 2023-08-28

## 2023-08-28 NOTE — HOME HEALTH
TE/TA today.  WC/WS. Safety assessment, transfers, AMB w/o LOB or with assisteve device.  No new falls.  Improved endurance.  RA today.  Pt is nearing met goals however she is having hernia surgery on 8/29.  RN and OT to remain in at 1x per week.  Pt and CG agreeable.  CG has placed BSC over toliet in preparation of need post hernia sx.  May require half bar on her side of the bed.  Pt currently mod indep/min A in seated bathing with grab bars, tub chairm and pull down sprayer.  Cont to increased UE stregnth to include , wx on dynamic balance, enduracne for ADL carryover.  Pt/Cg reports compliance with HEP.  Cont with goals on files, modify after pt returns home from hernia sx at 2x per wk.

## 2023-08-28 NOTE — HOME HEALTH
SNV for cardiopulmonary assessment. Patient is having outpatient surgery tomorrow for hernia repair. No issues or concerns noted. Will plan to see as scheduled.

## 2023-08-29 ENCOUNTER — HOSPITAL ENCOUNTER (OUTPATIENT)
Facility: HOSPITAL | Age: 73
Discharge: HOME | End: 2023-08-30
Attending: SURGERY | Admitting: SURGERY
Payer: MEDICARE

## 2023-08-29 ENCOUNTER — ANESTHESIA EVENT CONVERTED (OUTPATIENT)
Dept: ANESTHESIOLOGY | Facility: HOSPITAL | Age: 73
End: 2023-08-29
Payer: MEDICARE

## 2023-08-29 ENCOUNTER — ANESTHESIA (OUTPATIENT)
Dept: PERIOP | Facility: HOSPITAL | Age: 73
End: 2023-08-29
Payer: MEDICARE

## 2023-08-29 DIAGNOSIS — K43.2 INCISIONAL HERNIA WITHOUT OBSTRUCTION OR GANGRENE: Primary | ICD-10-CM

## 2023-08-29 LAB
GLUCOSE BLDC GLUCOMTR-MCNC: 114 MG/DL (ref 70–130)
GLUCOSE BLDC GLUCOMTR-MCNC: 118 MG/DL (ref 70–130)
GLUCOSE BLDC GLUCOMTR-MCNC: 133 MG/DL (ref 70–130)
GLUCOSE BLDC GLUCOMTR-MCNC: 83 MG/DL (ref 70–130)

## 2023-08-29 PROCEDURE — A9270 NON-COVERED ITEM OR SERVICE: HCPCS | Performed by: SURGERY

## 2023-08-29 PROCEDURE — 25010000002 CEFAZOLIN IN DEXTROSE 2000 MG/ 100 ML SOLUTION: Performed by: SURGERY

## 2023-08-29 PROCEDURE — 63710000001 SENNOSIDES-DOCUSATE 8.6-50 MG TABLET: Performed by: SURGERY

## 2023-08-29 PROCEDURE — 82948 REAGENT STRIP/BLOOD GLUCOSE: CPT

## 2023-08-29 PROCEDURE — 25010000002 HYDROMORPHONE 1 MG/ML SOLUTION: Performed by: SURGERY

## 2023-08-29 PROCEDURE — 63710000001 PANTOPRAZOLE 40 MG TABLET DELAYED-RELEASE: Performed by: SURGERY

## 2023-08-29 PROCEDURE — 63710000001 FEBUXOSTAT 40 MG TABLET: Performed by: SURGERY

## 2023-08-29 PROCEDURE — 94660 CPAP INITIATION&MGMT: CPT

## 2023-08-29 PROCEDURE — C1781 MESH (IMPLANTABLE): HCPCS | Performed by: SURGERY

## 2023-08-29 PROCEDURE — 25010000002 PROPOFOL 10 MG/ML EMULSION: Performed by: NURSE ANESTHETIST, CERTIFIED REGISTERED

## 2023-08-29 PROCEDURE — 25010000002 BUPIVACAINE (PF) 0.25 % SOLUTION: Performed by: NURSE ANESTHETIST, CERTIFIED REGISTERED

## 2023-08-29 PROCEDURE — 25010000002 SUGAMMADEX 200 MG/2ML SOLUTION: Performed by: NURSE ANESTHETIST, CERTIFIED REGISTERED

## 2023-08-29 PROCEDURE — 63710000001 OXYBUTYNIN XL 5 MG TABLET SUSTAINED-RELEASE 24 HOUR: Performed by: SURGERY

## 2023-08-29 PROCEDURE — 25010000002 FENTANYL CITRATE (PF) 100 MCG/2ML SOLUTION: Performed by: NURSE ANESTHETIST, CERTIFIED REGISTERED

## 2023-08-29 PROCEDURE — S0260 H&P FOR SURGERY: HCPCS

## 2023-08-29 PROCEDURE — 25010000002 CEFAZOLIN IN DEXTROSE 2-4 GM/100ML-% SOLUTION: Performed by: SURGERY

## 2023-08-29 PROCEDURE — 63710000001 PREGABALIN 100 MG CAPSULE: Performed by: SURGERY

## 2023-08-29 PROCEDURE — 25010000002 ONDANSETRON PER 1 MG: Performed by: NURSE ANESTHETIST, CERTIFIED REGISTERED

## 2023-08-29 PROCEDURE — 63710000001 SERTRALINE 100 MG TABLET: Performed by: SURGERY

## 2023-08-29 PROCEDURE — 49595 RPR AA HRN 1ST > 10 RDC: CPT | Performed by: PHYSICIAN ASSISTANT

## 2023-08-29 PROCEDURE — 25010000002 DEXAMETHASONE SODIUM PHOSPHATE 10 MG/ML SOLUTION: Performed by: NURSE ANESTHETIST, CERTIFIED REGISTERED

## 2023-08-29 DEVICE — BARD MESH
Type: IMPLANTABLE DEVICE | Site: ABDOMEN | Status: FUNCTIONAL
Brand: BARD MESH

## 2023-08-29 RX ORDER — OXYBUTYNIN CHLORIDE 5 MG/1
5 TABLET, EXTENDED RELEASE ORAL 2 TIMES DAILY
Status: DISCONTINUED | OUTPATIENT
Start: 2023-08-29 | End: 2023-08-30 | Stop reason: HOSPADM

## 2023-08-29 RX ORDER — SUCCINYLCHOLINE/SOD CL,ISO/PF 200MG/10ML
SYRINGE (ML) INTRAVENOUS AS NEEDED
Status: DISCONTINUED | OUTPATIENT
Start: 2023-08-29 | End: 2023-08-29 | Stop reason: SURG

## 2023-08-29 RX ORDER — ROCURONIUM BROMIDE 10 MG/ML
INJECTION, SOLUTION INTRAVENOUS AS NEEDED
Status: DISCONTINUED | OUTPATIENT
Start: 2023-08-29 | End: 2023-08-29 | Stop reason: SURG

## 2023-08-29 RX ORDER — MIDAZOLAM HYDROCHLORIDE 1 MG/ML
0.5 INJECTION INTRAMUSCULAR; INTRAVENOUS
Status: DISCONTINUED | OUTPATIENT
Start: 2023-08-29 | End: 2023-08-29 | Stop reason: HOSPADM

## 2023-08-29 RX ORDER — ONDANSETRON 2 MG/ML
INJECTION INTRAMUSCULAR; INTRAVENOUS AS NEEDED
Status: DISCONTINUED | OUTPATIENT
Start: 2023-08-29 | End: 2023-08-29 | Stop reason: SURG

## 2023-08-29 RX ORDER — AMOXICILLIN 250 MG
2 CAPSULE ORAL 2 TIMES DAILY
Status: DISCONTINUED | OUTPATIENT
Start: 2023-08-29 | End: 2023-08-30 | Stop reason: HOSPADM

## 2023-08-29 RX ORDER — PREGABALIN 100 MG/1
100 CAPSULE ORAL 2 TIMES DAILY
Status: DISCONTINUED | OUTPATIENT
Start: 2023-08-29 | End: 2023-08-30 | Stop reason: HOSPADM

## 2023-08-29 RX ORDER — AMLODIPINE BESYLATE 10 MG/1
10 TABLET ORAL EVERY MORNING
Status: DISCONTINUED | OUTPATIENT
Start: 2023-08-29 | End: 2023-08-30 | Stop reason: HOSPADM

## 2023-08-29 RX ORDER — SODIUM CHLORIDE 9 MG/ML
100 INJECTION, SOLUTION INTRAVENOUS CONTINUOUS
Status: DISCONTINUED | OUTPATIENT
Start: 2023-08-29 | End: 2023-08-30

## 2023-08-29 RX ORDER — DROPERIDOL 2.5 MG/ML
0.62 INJECTION, SOLUTION INTRAMUSCULAR; INTRAVENOUS ONCE AS NEEDED
Status: DISCONTINUED | OUTPATIENT
Start: 2023-08-29 | End: 2023-08-29 | Stop reason: HOSPADM

## 2023-08-29 RX ORDER — SERTRALINE HYDROCHLORIDE 100 MG/1
100 TABLET, FILM COATED ORAL EVERY MORNING
Status: DISCONTINUED | OUTPATIENT
Start: 2023-08-29 | End: 2023-08-30 | Stop reason: HOSPADM

## 2023-08-29 RX ORDER — FENTANYL CITRATE 50 UG/ML
INJECTION, SOLUTION INTRAMUSCULAR; INTRAVENOUS AS NEEDED
Status: DISCONTINUED | OUTPATIENT
Start: 2023-08-29 | End: 2023-08-29 | Stop reason: SURG

## 2023-08-29 RX ORDER — SODIUM CHLORIDE 9 MG/ML
40 INJECTION, SOLUTION INTRAVENOUS AS NEEDED
Status: DISCONTINUED | OUTPATIENT
Start: 2023-08-29 | End: 2023-08-29 | Stop reason: HOSPADM

## 2023-08-29 RX ORDER — LIDOCAINE HYDROCHLORIDE 10 MG/ML
INJECTION, SOLUTION EPIDURAL; INFILTRATION; INTRACAUDAL; PERINEURAL AS NEEDED
Status: DISCONTINUED | OUTPATIENT
Start: 2023-08-29 | End: 2023-08-29 | Stop reason: SURG

## 2023-08-29 RX ORDER — INSULIN LISPRO 100 [IU]/ML
3-14 INJECTION, SOLUTION INTRAVENOUS; SUBCUTANEOUS
Status: DISCONTINUED | OUTPATIENT
Start: 2023-08-29 | End: 2023-08-30 | Stop reason: HOSPADM

## 2023-08-29 RX ORDER — PANTOPRAZOLE SODIUM 40 MG/1
40 TABLET, DELAYED RELEASE ORAL
Status: DISCONTINUED | OUTPATIENT
Start: 2023-08-29 | End: 2023-08-30 | Stop reason: HOSPADM

## 2023-08-29 RX ORDER — SODIUM CHLORIDE 0.9 % (FLUSH) 0.9 %
10 SYRINGE (ML) INJECTION EVERY 12 HOURS SCHEDULED
Status: DISCONTINUED | OUTPATIENT
Start: 2023-08-29 | End: 2023-08-29 | Stop reason: HOSPADM

## 2023-08-29 RX ORDER — DEXAMETHASONE SODIUM PHOSPHATE 10 MG/ML
INJECTION, SOLUTION INTRAMUSCULAR; INTRAVENOUS
Status: COMPLETED | OUTPATIENT
Start: 2023-08-29 | End: 2023-08-29

## 2023-08-29 RX ORDER — FENTANYL CITRATE 50 UG/ML
50 INJECTION, SOLUTION INTRAMUSCULAR; INTRAVENOUS
Status: DISCONTINUED | OUTPATIENT
Start: 2023-08-29 | End: 2023-08-29 | Stop reason: HOSPADM

## 2023-08-29 RX ORDER — CEFAZOLIN SODIUM 2 G/100ML
2000 INJECTION, SOLUTION INTRAVENOUS EVERY 8 HOURS
Status: DISCONTINUED | OUTPATIENT
Start: 2023-08-29 | End: 2023-08-30 | Stop reason: HOSPADM

## 2023-08-29 RX ORDER — BUPIVACAINE HYDROCHLORIDE 2.5 MG/ML
INJECTION, SOLUTION EPIDURAL; INFILTRATION; INTRACAUDAL
Status: COMPLETED | OUTPATIENT
Start: 2023-08-29 | End: 2023-08-29

## 2023-08-29 RX ORDER — IBUPROFEN 600 MG/1
1 TABLET ORAL
Status: DISCONTINUED | OUTPATIENT
Start: 2023-08-29 | End: 2023-08-30 | Stop reason: HOSPADM

## 2023-08-29 RX ORDER — NALOXONE HCL 0.4 MG/ML
0.1 VIAL (ML) INJECTION
Status: DISCONTINUED | OUTPATIENT
Start: 2023-08-29 | End: 2023-08-30 | Stop reason: HOSPADM

## 2023-08-29 RX ORDER — SODIUM CHLORIDE 0.9 % (FLUSH) 0.9 %
10 SYRINGE (ML) INJECTION AS NEEDED
Status: DISCONTINUED | OUTPATIENT
Start: 2023-08-29 | End: 2023-08-29 | Stop reason: HOSPADM

## 2023-08-29 RX ORDER — NEBIVOLOL 10 MG/1
10 TABLET ORAL EVERY MORNING
Status: DISCONTINUED | OUTPATIENT
Start: 2023-08-29 | End: 2023-08-30 | Stop reason: HOSPADM

## 2023-08-29 RX ORDER — NICOTINE POLACRILEX 4 MG
15 LOZENGE BUCCAL
Status: DISCONTINUED | OUTPATIENT
Start: 2023-08-29 | End: 2023-08-30 | Stop reason: HOSPADM

## 2023-08-29 RX ORDER — HYDROCODONE BITARTRATE AND ACETAMINOPHEN 5; 325 MG/1; MG/1
1 TABLET ORAL EVERY 4 HOURS PRN
Status: DISCONTINUED | OUTPATIENT
Start: 2023-08-29 | End: 2023-08-30 | Stop reason: HOSPADM

## 2023-08-29 RX ORDER — ONDANSETRON 2 MG/ML
4 INJECTION INTRAMUSCULAR; INTRAVENOUS EVERY 6 HOURS PRN
Status: DISCONTINUED | OUTPATIENT
Start: 2023-08-29 | End: 2023-08-30 | Stop reason: HOSPADM

## 2023-08-29 RX ORDER — LIDOCAINE HYDROCHLORIDE 10 MG/ML
0.5 INJECTION, SOLUTION EPIDURAL; INFILTRATION; INTRACAUDAL; PERINEURAL ONCE AS NEEDED
Status: COMPLETED | OUTPATIENT
Start: 2023-08-29 | End: 2023-08-29

## 2023-08-29 RX ORDER — DEXTROSE MONOHYDRATE 25 G/50ML
25 INJECTION, SOLUTION INTRAVENOUS
Status: DISCONTINUED | OUTPATIENT
Start: 2023-08-29 | End: 2023-08-30 | Stop reason: HOSPADM

## 2023-08-29 RX ORDER — CEFAZOLIN SODIUM 2 G/100ML
2000 INJECTION, SOLUTION INTRAVENOUS ONCE
Status: COMPLETED | OUTPATIENT
Start: 2023-08-29 | End: 2023-08-29

## 2023-08-29 RX ORDER — TETRACAINE HYDROCHLORIDE 5 MG/ML
1 SOLUTION OPHTHALMIC ONCE
Status: COMPLETED | OUTPATIENT
Start: 2023-08-29 | End: 2023-08-29

## 2023-08-29 RX ORDER — FAMOTIDINE 20 MG/1
20 TABLET, FILM COATED ORAL ONCE
Status: COMPLETED | OUTPATIENT
Start: 2023-08-29 | End: 2023-08-29

## 2023-08-29 RX ORDER — SODIUM CHLORIDE, SODIUM LACTATE, POTASSIUM CHLORIDE, CALCIUM CHLORIDE 600; 310; 30; 20 MG/100ML; MG/100ML; MG/100ML; MG/100ML
9 INJECTION, SOLUTION INTRAVENOUS CONTINUOUS
Status: DISCONTINUED | OUTPATIENT
Start: 2023-08-29 | End: 2023-08-30 | Stop reason: HOSPADM

## 2023-08-29 RX ORDER — PROPOFOL 10 MG/ML
VIAL (ML) INTRAVENOUS AS NEEDED
Status: DISCONTINUED | OUTPATIENT
Start: 2023-08-29 | End: 2023-08-29 | Stop reason: SURG

## 2023-08-29 RX ORDER — FEBUXOSTAT 40 MG/1
40 TABLET, FILM COATED ORAL EVERY MORNING
Status: DISCONTINUED | OUTPATIENT
Start: 2023-08-29 | End: 2023-08-30 | Stop reason: HOSPADM

## 2023-08-29 RX ORDER — EPHEDRINE SULFATE 50 MG/ML
INJECTION INTRAVENOUS AS NEEDED
Status: DISCONTINUED | OUTPATIENT
Start: 2023-08-29 | End: 2023-08-29 | Stop reason: SURG

## 2023-08-29 RX ORDER — SODIUM CHLORIDE, SODIUM LACTATE, POTASSIUM CHLORIDE, CALCIUM CHLORIDE 600; 310; 30; 20 MG/100ML; MG/100ML; MG/100ML; MG/100ML
INJECTION, SOLUTION INTRAVENOUS CONTINUOUS PRN
Status: DISCONTINUED | OUTPATIENT
Start: 2023-08-29 | End: 2023-08-29 | Stop reason: SURG

## 2023-08-29 RX ORDER — ONDANSETRON 2 MG/ML
4 INJECTION INTRAMUSCULAR; INTRAVENOUS ONCE AS NEEDED
Status: DISCONTINUED | OUTPATIENT
Start: 2023-08-29 | End: 2023-08-29 | Stop reason: HOSPADM

## 2023-08-29 RX ORDER — ENALAPRILAT 1.25 MG/ML
1.25 INJECTION INTRAVENOUS EVERY 6 HOURS PRN
Status: DISCONTINUED | OUTPATIENT
Start: 2023-08-29 | End: 2023-08-30 | Stop reason: HOSPADM

## 2023-08-29 RX ORDER — ACETAMINOPHEN 325 MG/1
650 TABLET ORAL EVERY 4 HOURS PRN
Status: DISCONTINUED | OUTPATIENT
Start: 2023-08-29 | End: 2023-08-30 | Stop reason: HOSPADM

## 2023-08-29 RX ORDER — ONDANSETRON 4 MG/1
4 TABLET, FILM COATED ORAL EVERY 6 HOURS PRN
Status: DISCONTINUED | OUTPATIENT
Start: 2023-08-29 | End: 2023-08-30 | Stop reason: HOSPADM

## 2023-08-29 RX ORDER — MAGNESIUM HYDROXIDE 1200 MG/15ML
LIQUID ORAL AS NEEDED
Status: DISCONTINUED | OUTPATIENT
Start: 2023-08-29 | End: 2023-08-29 | Stop reason: HOSPADM

## 2023-08-29 RX ORDER — HYDROMORPHONE HYDROCHLORIDE 1 MG/ML
0.5 INJECTION, SOLUTION INTRAMUSCULAR; INTRAVENOUS; SUBCUTANEOUS
Status: DISCONTINUED | OUTPATIENT
Start: 2023-08-29 | End: 2023-08-29 | Stop reason: HOSPADM

## 2023-08-29 RX ADMIN — EPHEDRINE SULFATE 10 MG: 50 INJECTION INTRAVENOUS at 08:47

## 2023-08-29 RX ADMIN — EPHEDRINE SULFATE 5 MG: 50 INJECTION INTRAVENOUS at 08:35

## 2023-08-29 RX ADMIN — FEBUXOSTAT 40 MG: 40 TABLET, FILM COATED ORAL at 13:48

## 2023-08-29 RX ADMIN — PREGABALIN 100 MG: 100 CAPSULE ORAL at 20:40

## 2023-08-29 RX ADMIN — DEXAMETHASONE SODIUM PHOSPHATE 4 MG: 10 INJECTION, SOLUTION INTRAMUSCULAR; INTRAVENOUS at 07:42

## 2023-08-29 RX ADMIN — CEFAZOLIN SODIUM 2000 MG: 2 INJECTION, SOLUTION INTRAVENOUS at 07:41

## 2023-08-29 RX ADMIN — TETRACAINE HYDROCHLORIDE 1 DROP: 5 SOLUTION OPHTHALMIC at 13:48

## 2023-08-29 RX ADMIN — ONDANSETRON 4 MG: 2 INJECTION INTRAMUSCULAR; INTRAVENOUS at 10:01

## 2023-08-29 RX ADMIN — SODIUM CHLORIDE, POTASSIUM CHLORIDE, SODIUM LACTATE AND CALCIUM CHLORIDE 9 ML/HR: 600; 310; 30; 20 INJECTION, SOLUTION INTRAVENOUS at 06:40

## 2023-08-29 RX ADMIN — EPHEDRINE SULFATE 5 MG: 50 INJECTION INTRAVENOUS at 08:38

## 2023-08-29 RX ADMIN — ROCURONIUM BROMIDE 10 MG: 10 SOLUTION INTRAVENOUS at 08:50

## 2023-08-29 RX ADMIN — ROCURONIUM BROMIDE 5 MG: 10 SOLUTION INTRAVENOUS at 07:40

## 2023-08-29 RX ADMIN — OXYBUTYNIN CHLORIDE 5 MG: 5 TABLET, EXTENDED RELEASE ORAL at 20:40

## 2023-08-29 RX ADMIN — LIDOCAINE HYDROCHLORIDE 0.2 ML: 10 INJECTION, SOLUTION EPIDURAL; INFILTRATION; INTRACAUDAL; PERINEURAL at 06:40

## 2023-08-29 RX ADMIN — BUPIVACAINE HYDROCHLORIDE 60 ML: 2.5 INJECTION, SOLUTION EPIDURAL; INFILTRATION; INTRACAUDAL; PERINEURAL at 07:42

## 2023-08-29 RX ADMIN — Medication 100 MG: at 07:40

## 2023-08-29 RX ADMIN — SENNOSIDES AND DOCUSATE SODIUM 2 TABLET: 50; 8.6 TABLET ORAL at 11:47

## 2023-08-29 RX ADMIN — FENTANYL CITRATE 100 MCG: 50 INJECTION, SOLUTION INTRAMUSCULAR; INTRAVENOUS at 07:40

## 2023-08-29 RX ADMIN — Medication 2000 MG: at 16:58

## 2023-08-29 RX ADMIN — Medication 2000 MG: at 23:22

## 2023-08-29 RX ADMIN — SERTRALINE HYDROCHLORIDE 100 MG: 100 TABLET ORAL at 11:47

## 2023-08-29 RX ADMIN — SODIUM CHLORIDE 100 ML/HR: 9 INJECTION, SOLUTION INTRAVENOUS at 13:09

## 2023-08-29 RX ADMIN — FAMOTIDINE 20 MG: 20 TABLET ORAL at 07:06

## 2023-08-29 RX ADMIN — PROPOFOL 200 MG: 10 INJECTION, EMULSION INTRAVENOUS at 07:40

## 2023-08-29 RX ADMIN — SODIUM CHLORIDE, POTASSIUM CHLORIDE, SODIUM LACTATE AND CALCIUM CHLORIDE: 600; 310; 30; 20 INJECTION, SOLUTION INTRAVENOUS at 07:30

## 2023-08-29 RX ADMIN — HYDROMORPHONE HYDROCHLORIDE 0.5 MG: 1 INJECTION, SOLUTION INTRAMUSCULAR; INTRAVENOUS; SUBCUTANEOUS at 11:47

## 2023-08-29 RX ADMIN — ROCURONIUM BROMIDE 45 MG: 10 SOLUTION INTRAVENOUS at 07:50

## 2023-08-29 RX ADMIN — PREGABALIN 100 MG: 100 CAPSULE ORAL at 11:47

## 2023-08-29 RX ADMIN — SENNOSIDES AND DOCUSATE SODIUM 2 TABLET: 50; 8.6 TABLET ORAL at 20:40

## 2023-08-29 RX ADMIN — SUGAMMADEX 200 MG: 100 INJECTION, SOLUTION INTRAVENOUS at 10:01

## 2023-08-29 RX ADMIN — LIDOCAINE HYDROCHLORIDE 50 MG: 10 INJECTION, SOLUTION EPIDURAL; INFILTRATION; INTRACAUDAL; PERINEURAL at 07:40

## 2023-08-29 RX ADMIN — PANTOPRAZOLE SODIUM 40 MG: 40 TABLET, DELAYED RELEASE ORAL at 11:48

## 2023-08-29 RX ADMIN — OXYBUTYNIN CHLORIDE 5 MG: 5 TABLET, EXTENDED RELEASE ORAL at 11:47

## 2023-08-29 NOTE — OP NOTE
Operative Note    Lissett Lowry  [id number]   1950     Date of Surgery:  8/29/2023    Pre-Operative Diagnosis: Umbilical and incisional hernias (10 cm)    Post-Operative Diagnosis: Umbilical and incisional hernias (15 cm)    Procedure: Repair of umbilical and incisional hernias with Prolene mesh                      Adhesiolysis    Anesthesia:  General with Block          Surgeon:  Royal Tineo MD    Circulator: Vicente Devlin RN; Carla Corey RN  Scrub Person: Martina Hill RN; Cornelia Dean  Nursing Assistant: Trish Godfrey PCT  Assistant: Luisito Gusman PA-C     Assistant: Luisito Gusman PA-C    Estimated Blood Loss: Very minimal    Findings: Umbilical hernia defect with a very large hernia sac containing omentum.                   Multiple lower abdominal wall incisional hernias with extensive adhesions.  Adhesiolysis encompassed approximately 30% of the case.    Complications: None      Indication for Procedure: Mrs. Lowry is a pleasant 73-year-old lady who presented with a large abdominal wall hernia after marked weight loss.  CT scan of the abdomen pelvis was remarkable for a large umbilical hernia sac as well as an incisional hernia in the lower abdomen.  Patient has been hospitalized recently with a small bowel obstruction that has resolved spontaneously.  SBO was caused by the hernias.  She presents today for surgical repair.  She stopped Xarelto 3 days ago.    Procedure: Patient was taken to the operating room by anesthesia and placed supine on the table.  Following induction of general endotracheal anesthesia, SCDs were placed.  A Broderick catheter was anchored.  She received 2 g of Kefzol IV.  Anesthesia placed ultrasound-guided TAP block bilaterally.  The abdomen was then prepped and draped in a sterile fashion.  Timeout was observed.  An infraumbilical vertical midline incision was made and dissection was carried through the subcutaneous tissue to expose a large hernia  sac right at the umbilical level the sac was dissected down to expose the fascial defect.  The sac was then opened to reveal omental adhesions.  These were taken down to free the sac and mobilized the omentum.  The omentum was then repaired with running 2-0 Vicryl suture and placed back in the abdominal cavity.  The incision was extended through the midline fascia which was relatively thin to expose another incisional hernia in the lower abdominal wall at the midline.  Extensive small bowel adhesions were noted at the site.  These were taken down with care to avoid any injury to the bowel.  3 hernia sacs were noted at that site.  Once the bowel was freed.  It was inspected with no enterotomies noted.  It was placed back in the abdominal cavity.  We then proceeded by excising the hernia sacs and discarding them.  The posterior fascial layer was then dissected away from the overlying rectus muscles as well as anterior fascial layer on either side.  The dissection was carried laterally beyond inferior epigastric vessels.  Care was taken to avoid any injury to the vessels.  The procedure fascial layer was then approximated with running 0 Vicryl suture with no tension noted.  The wound was irrigated with normal saline with clear return of fluid noted.  A large piece of Prolene mesh measuring approximately 10 x 12 inches was placed covering the posterior fascial layer as well as under the rectus muscles on either side nicely.  It was anchored anteriorly to the overlying fascia with interrupted 0 Prolene suture.  The midline fascia was then approximated vertically to the underlying mesh using figure of 8, 0 Prolene sutures.  No tension was noted.  The wound was irrigated with Irrisept solution.  The umbilicus was anchored to the underlying fascia with 2-0 Vicryl suture.  15 Montserratian round Jasper-Juares drain was placed through the left lower quadrant and anchored to the skin with 2-0 silk suture.  The subcutaneous tissues  approximated with running 2-0 Vicryl suture and the skin incision was approximated with staples.  Covaderm dressing was applied as well as an abdominal binder.    The patient tolerated the procedure well with no complications.  She was extubated and taken to the recovery room in stable condition.  Sponge count and needle count were correct at the end of the procedure.    Assistant: Luisito Gusman PA-C  was responsible for performing the following activities: Retraction, Suction, Closing, and Placing Dressing and their skilled assistance was necessary for the success of this case.      Royal Tineo MD  08/29/23  16:38 EDT

## 2023-08-29 NOTE — H&P
"Pre-Op H&P  Lissett Lowry  [id number]  1950    Chief complaint: \" I am here for hernia repair surgery.\"    HPI:    Patient is a 73 y.o.female who presents with an incisional hernia.  She presents today for repair of incisional hernia, more than 10 cm.  She reports hernia has been present for years and states that the primary symptom is a bulge.  Patient reports hospitalization in July which required IV antibiotics that have been completed.  She denies any symptoms such as fever or chills.  Patient reports she takes Xarelto and states her last dose was either 8/25/2023 or 8/26/2023.  She reports she was most recently evaluated by Dr. FAYE a few months ago and denies any changes since this office visit.    Review of Systems:  General ROS: negative for chills, fever or skin lesions;  No changes since last office visit.  Neg for recent sick exposure  Cardiovascular ROS: no chest pain or dyspnea on exertion  Respiratory ROS: no cough, shortness of breath, or wheezing    Allergies: Denies allergy to latex or contrast dye.  Allergies   Allergen Reactions    Lamisil [Terbinafine] Other (See Comments)     \"IT CAUSED chemically induced LUPUS\"    Duract [Bromfenac] Hives    Duricef [Cefadroxil] Hives     Has tolerated ceftriaxone, cefazolin    Erythromycin Hives    Lasix [Furosemide] Other (See Comments)     PT STATES \"IM JUST VERY SENSITIVE TO IT.\"    Mevacor [Lovastatin] Other (See Comments)     PT REPORTS \"IT JUST MADE ME FEEL SICK, I COULDN'T TOLERATE IT NOTHING SPECIFIC.\"    Sulfa Antibiotics Hives    Zocor [Simvastatin] Other (See Comments)     \"IT JUST MADE ME FEEL SICK, I COULDN'T TOLERATE IT. NOTHING SPECIFIC.\"         Home Meds:    No current facility-administered medications on file prior to encounter.     Current Outpatient Medications on File Prior to Encounter   Medication Sig Dispense Refill    acetaminophen (TYLENOL) 500 MG tablet Take 1 tablet by mouth Daily.      acetaminophen (TYLENOL) 650 MG 8 hr " tablet Take 2 tablets by mouth 3 (Three) Times a Day. Indications: Fever, Pain      amLODIPine (NORVASC) 10 MG tablet Take 1 tablet by mouth Every Morning. Indications: HTN      Bumetanide (BUMEX PO) Take 2 mg by mouth Every Morning. Indications: edema      dexlansoprazole (DEXILANT) 60 MG capsule Take 1 capsule by mouth Every Morning. Indications: Gastroesophageal Reflux Disease, Heartburn      ezetimibe (ZETIA) 10 MG tablet Take 1 tablet by mouth Daily. Indications: High Amount of Fats in the Blood      febuxostat (ULORIC) 40 MG tablet Take 1 tablet by mouth Every Morning. Indications: Gout      nebivolol (BYSTOLIC) 10 MG tablet Take 1 tablet by mouth Every Morning. Indications: High Blood Pressure Disorder      oxybutynin XL (DITROPAN-XL) 5 MG 24 hr tablet Take 1 tablet by mouth 2 (Two) Times a Day. Indications: Urinary Incontinence      pregabalin (LYRICA) 100 MG capsule Take 1 capsule by mouth 2 (Two) Times a Day. Indications: Neuropathic Pain, Restless Leg Syndrome      Rivaroxaban (XARELTO) 2.5 MG tablet Take 1 tablet by mouth Every Morning. Indications: h/o PE      rosuvastatin (CRESTOR) 5 MG tablet Take 5 mg by mouth Daily. (Patient not taking: Reported on 7/20/2023)      Semaglutide, 1 MG/DOSE, (OZEMPIC) 2 MG/1.5ML solution pen-injector Inject 1 mg under the skin into the appropriate area as directed 1 (One) Time Per Week. Wednesdays  Indications: Type 2 Diabetes      sertraline (ZOLOFT) 100 MG tablet Take 1 tablet by mouth Every Morning. Indications: Major Depressive Disorder         PMH:   Past Medical History:   Diagnosis Date    Anemia     Chronic kidney disease, stage 3     Chronic pain in left shoulder     Depression     Diabetes mellitus     Disease of thyroid gland     as a child- underactive     Elevated cholesterol     Gout     Heart disease     Heart valve disease     sees Dr. Leahy at [facility]    History of Clostridium difficile infection     History of kidney stones     Hyperlipidemia      Hypertension     IBS (irritable bowel syndrome)     Incisional hernia with bowel obstruction 07/2023    Kidney stone     Obesity     PHYLLIS on CPAP     CPAP compliant, pressure setting 10    Osteoarthritis of right elbow     Osteoporosis     Primary osteoarthritis of left knee     Rotator cuff tear     Sjogren's syndrome     SLE (systemic lupus erythematosus)     in remission    SOB (shortness of breath)     Staphylococcal infection 07/2023    completed 1 month of abx infusions    Wears glasses      PSH:    Past Surgical History:   Procedure Laterality Date    APPENDECTOMY      CARDIAC CATHETERIZATION      no stents    CATARACT EXTRACTION      bilateral    CHOLECYSTECTOMY      COLONOSCOPY  2017    ENDOSCOPY      HYSTERECTOMY      LUMBAR FUSION  2022    OTHER SURGICAL HISTORY      Vitrectomy    TOTAL KNEE ARTHROPLASTY Left 08/26/2019    Procedure: TOTAL KNEE ARTHROPLASTY LEFT;  Surgeon: Rony Rojas MD;  Location: [facility];  Service: Orthopedics       Immunization History:  Influenza: Yes  Pneumococcal: Yes  Tetanus: No    Social History:   Tobacco:   Social History     Tobacco Use   Smoking Status Never   Smokeless Tobacco Never      Alcohol:     Social History     Substance and Sexual Activity   Alcohol Use No       Vitals:           There were no vitals taken for this visit.    Physical Exam:  General Appearance:    Alert, cooperative, no distress, appears stated age   Head:    Normocephalic, without obvious abnormality, atraumatic   Lungs:     Clear to auscultation bilaterally, respirations unlabored    Heart:   Regular rate and rhythm, S1 and S2 normal, no murmur, rub    or gallop    Abdomen:  Large hernia present upon palpation.  Soft, nontender.  +bowel sounds   Breast Exam:    deferred   Genitalia:    deferred   Extremities:   Extremities normal, atraumatic, no cyanosis or edema   Skin:   Skin color, texture, turgor normal, no rashes or lesions   Neurologic:   Grossly intact   Results Review  LABS:  Lab  Results   Component Value Date    WBC 5.21 08/25/2023    HGB 12.8 08/25/2023    HCT 39.3 08/25/2023    MCV 95.9 08/25/2023     08/25/2023    NEUTROABS 3.30 08/18/2023    GLUCOSE 77 08/25/2023    BUN 19 08/25/2023    CREATININE 0.77 08/25/2023    EGFRIFNONA 48 (L) 08/27/2019     08/25/2023    K 3.9 08/25/2023     08/25/2023    CO2 30.0 (H) 08/25/2023    MG 2.3 07/24/2023    PHOS 2.3 (L) 07/23/2023    CALCIUM 10.5 08/25/2023    ALBUMIN 3.5 08/18/2023    AST 18 08/18/2023    ALT 14 08/18/2023    BILITOT 0.3 08/18/2023    INR 1.06 08/25/2023       RADIOLOGY:  No radiology results for the last 3 days     I reviewed the patient's new clinical results.    Cancer Staging (if applicable)  Cancer Patient: __ yes _x_no __unknown; If yes, clinical stage T:__ N:__M:__, stage group or __N/A    Impression: Patient presents with incisional hernia.    Plan: repair of incisional hernia, more than 10 cm.       Durga Spencer PA-C   08/29/23   6:55 AM EDT

## 2023-08-29 NOTE — ANESTHESIA PROCEDURE NOTES
"Peripheral Block      Patient reassessed immediately prior to procedure    Patient location during procedure: OR  Reason for block: at surgeon's request and post-op pain management  Performed by  CRNA/CAA: Ryan Mendoza, ABIMBOLA  Preanesthetic Checklist  Completed: patient identified, IV checked, site marked, risks and benefits discussed, surgical consent, monitors and equipment checked, pre-op evaluation and timeout performed  Prep:  Pt Position: supine  Sterile barriers:cap, gloves, mask and washed/disinfected hands  Prep: ChloraPrep  Patient monitoring: blood pressure monitoring, continuous pulse oximetry and EKG  Procedure    Sedation: yes  Performed under: general  Guidance:ultrasound guided  Images:still images obtained, printed/placed on chart    Laterality:Bilateral  Block Type:TAP  Injection Technique:single-shot  Needle Type:short-bevel and echogenic  Needle Gauge:20 G  Resistance on Injection: none    Medications Used: bupivacaine PF (MARCAINE) 0.25 % injection - Injection   60 mL - 8/29/2023 7:42:00 AM  dexamethasone sodium phosphate injection - Injection   4 mg - 8/29/2023 7:42:00 AM      Medications  Comment:Block Injection:  LA dose divided between Right and Left block        Post Assessment  Injection Assessment: negative aspiration for heme, incremental injection and no paresthesia on injection  Patient Tolerance:comfortable throughout block  Complications:no  Additional Notes    Subcostal TAPs    A high-frequency linear transducer, with sterile cover, was placed sub-xiphoid to identify [place], right and left Rectus Abdominus Muscles (CONOR). The transducer was moved either right or left subcostally to identify the CONOR and the Transverse Abdominus Muscle (DAVIS). The insertion site was prepped in sterile fashion and then localized with 2-5 ml of 1% Lidocaine. Using ultrasound-guidance, a 20-gauge B-Christopher 4\" Ultraplex 360 non-stimulating echogenic needle was advanced in plane, from medial to lateral, " until the tip of the needle was in the fascial plane between the CONOR and DAVIS. 1-3ml of preservative free normal saline was used to hydro-dissect the fascial planes. After the fascial plane was verified, the local anesthetic (LA) was injected. The procedure was repeated on the opposite side for bilateral coverage. Aspiration every 5 ml to prevent intravascular injection. Injection was completed with negative aspiration of blood and negative intravascular injection. Injection pressures were normal with minimal resistance. The subcostal approach to the TAP nerve block ideally anesthetizes the intercostal nerves T6-T9.

## 2023-08-29 NOTE — ANESTHESIA PROCEDURE NOTES
Airway  Urgency: elective    Date/Time: 8/29/2023 7:52 AM  Airway not difficult    General Information and Staff    Patient location during procedure: OR    Indications and Patient Condition  Indications for airway management: airway protection    Preoxygenated: yes  MILS not maintained throughout  Mask difficulty assessment: 1 - vent by mask    Final Airway Details  Final airway type: endotracheal airway      Successful airway: ETT  Cuffed: yes   Successful intubation technique: direct laryngoscopy and RSI  Facilitating devices/methods: cricoid pressure  Endotracheal tube insertion site: oral  Blade: Stefania  Blade size: 3  ETT size (mm): 7.0  Cormack-Lehane Classification: grade I - full view of glottis  Placement verified by: chest auscultation and capnometry   Measured from: lips  ETT/EBT  to lips (cm): 22  Number of attempts at approach: 1  Assessment: lips, teeth, and gum same as pre-op and atraumatic intubation    Additional Comments  Negative epigastric sounds, Breath sound equal bilaterally with symmetric chest rise and fall

## 2023-08-29 NOTE — BRIEF OP NOTE
VENTRAL/INCISIONAL HERNIA REPAIR  Progress Note    Lissett Lowry  8/29/2023    Pre-op Diagnosis:   Umbilical and incisional hernias    Post-Op Diagnosis Codes:     * Incisional hernia without mention of obstruction or gangrene [K43.2]    Procedure/CPT® Codes:        Procedure(s):  REPAIR OF INCISIONAL HERNIA REPAIR, MORE THAN 10 CM  With Prolene mesh            Surgeon(s):  Royal Tineo MD    Anesthesia: General with Block    Staff:   Circulator: Vicente Devlin RN; Carla Corey RN  Scrub Person: Martina Hill RN; Cornelia Dean  Nursing Assistant: Trish Godfrey PCT  Assistant: Luisito Gusman PA-C  Assistant: Luisito Gusman PA-C      Estimated Blood Loss: minimal    Urine Voided: * No values recorded between 8/29/2023  7:27 AM and 8/29/2023  9:56 AM *    Specimens:                None          Drains:   Closed/Suction Drain 1 Abdomen Bulb 15 Fr. (Active)       Urethral Catheter Double-lumen 16 Fr. (Active)       Findings: Large umbilical hernia as well as incisional hernia.  Larger than 10 cm.  Multiple pelvic adhesions        Complications: None    Assistant: Luisito Gusman PA-C  was responsible for performing the following activities: Retraction, Suction, Closing, and Placing Dressing and their skilled assistance was necessary for the success of this case.    Royal Tineo MD     Date: 8/29/2023  Time: 09:57 EDT

## 2023-08-29 NOTE — PROGRESS NOTES
Lissett Liu Lowry  1950  [id number]    Surgery Post - Operative Note    Date of visit: 8/29/2023    Subjective: Comfortable with no complaints of pain    Objective:    /55 (BP Location: Left arm, Patient Position: Lying)   Pulse 71   Temp 98 °F (36.7 °C) (Temporal)   Resp 18   SpO2 95%     Intake/Output Summary (Last 24 hours) at 8/29/2023 1645  Last data filed at 8/29/2023 1550  Gross per 24 hour   Intake 1120 ml   Output 50 ml   Net 1070 ml         CV: Regular rate and rhythm  L: normal air entry  ABD: Abdominal binder is intact  Jasper-Juares with serosanguineous drainage  Good urine output    LABS:    Results from last 7 days   Lab Units 08/25/23  1249   WBC 10*3/mm3 5.21   HEMOGLOBIN g/dL 12.8   HEMATOCRIT % 39.3   PLATELETS 10*3/mm3 247     Results from last 7 days   Lab Units 08/25/23  1249   SODIUM mmol/L 142   POTASSIUM mmol/L 3.9   CHLORIDE mmol/L 103   CO2 mmol/L 30.0*   BUN mg/dL 19   CREATININE mg/dL 0.77   CALCIUM mg/dL 10.5   GLUCOSE mg/dL 77     Results from last 7 days   Lab Units 08/25/23  1249   SODIUM mmol/L 142   POTASSIUM mmol/L 3.9   CHLORIDE mmol/L 103   CO2 mmol/L 30.0*   BUN mg/dL 19   CREATININE mg/dL 0.77   GLUCOSE mg/dL 77   CALCIUM mg/dL 10.5     Lab Results   Lab Value Date/Time    LIPASE 15 07/20/2023 0843       Assessment/ Plan: Stable course status post repair of large abdominal wall incisional hernias as well as umbilical hernia with mesh   encourage pulmonary toilet  Continue with the current management    Problem List Items Addressed This Visit    None        Royal Tineo MD  8/29/2023  16:45 EDT

## 2023-08-29 NOTE — PLAN OF CARE
Goal Outcome Evaluation:  Plan of Care Reviewed With: patient, spouse        Progress: improving  Outcome Evaluation: Pt came to the floor around 1200 after a hernia repair. Abdominal binder is clean, dry and intact. RLQ KAITLYNN drain has some dry drainage, with adequate output. Pt is on 2L NC. VSS. Broderick catheter in place. Pt walked a little around her room today. Pain is triggered by movement, but otherwise well controlled. Continue care.       No RLQ KAITLYNN drain, just LLQ KAITLYNN drain.

## 2023-08-29 NOTE — ANESTHESIA PREPROCEDURE EVALUATION
Anesthesia Evaluation     Patient summary reviewed and Nursing notes reviewed   NPO Solid Status: > 8 hours  NPO Liquid Status: > 2 hours           Airway   Mallampati: I  TM distance: >3 FB  Neck ROM: full  No difficulty expected  Dental      Pulmonary     breath sounds clear to auscultation  (+) ,sleep apnea on CPAP  Cardiovascular     ECG reviewed  Rhythm: regular  Rate: normal    (+) hypertension, DVT      Neuro/Psych  GI/Hepatic/Renal/Endo    (+) obesity, diabetes mellitus    Musculoskeletal     Abdominal    Substance History      OB/GYN          Other   arthritis,     ROS/Med Hx Other: The aortic valve is abnormal in structure. There is calcification of the aortic valve. The aortic valve appears trileaflet. Trace aortic valve regurgitation is present. No aortic valve stenosis is present.  ú  There is moderate calcification of the mitral valve posterior leaflet. No evidence of a mitral valve mass is present. Mild mitral valve regurgitation is present. No significant mitral valve stenosis is present.  ú  The tricuspid valve is structurally normal with no significant stenosis present. Mild tricuspid valve regurgitation is present. No TV vegetation.  ú  The pulmonic valve is structurally normal with no significant stenosis present. There is trace pulmonic valve regurgitation present.  ú  There is no evidence of pericardial effusion. There is a well circumscribed 2.7 x 1.6cm mass in the L AV groove consistent with epicardial fat    On semaglutide                       Anesthesia Plan    ASA 3     general with block   Rapid sequence  intravenous induction     Anesthetic plan, risks, benefits, and alternatives have been provided, discussed and informed consent has been obtained with: patient.    Plan discussed with CRNA.    CODE STATUS:

## 2023-08-29 NOTE — ANESTHESIA POSTPROCEDURE EVALUATION
Patient: Lissett Lowry    Procedure Summary       Date: 08/29/23 Room / Location:  [facility]    Anesthesia Start: 0730 Anesthesia Stop: 1010    Procedure: REPAIR OF INCISIONAL HERNIA REPAIR, MORE THAN 10 CM (Abdomen) Diagnosis: Incisional hernia without mention of obstruction or gangrene    Surgeons: Royal Tineo MD Provider: Dieter Grijalva MD    Anesthesia Type: general with block ASA Status: 3            Anesthesia Type: general with block    Vitals  Vitals Value Taken Time   /69 08/29/23 1008   Temp     Pulse 61 08/29/23 1010   Resp     SpO2 98 % 08/29/23 1010   Vitals shown include unvalidated device data.        Post Anesthesia Care and Evaluation    Patient location during evaluation: PACU  Patient participation: complete - patient participated  Level of consciousness: awake and alert  Pain management: adequate    Airway patency: patent  Anesthetic complications: No anesthetic complications  PONV Status: none  Cardiovascular status: hemodynamically stable and acceptable  Respiratory status: nonlabored ventilation, acceptable and nasal cannula  Hydration status: acceptable      
Detail Level: Zone

## 2023-08-30 VITALS
RESPIRATION RATE: 16 BRPM | HEART RATE: 68 BPM | DIASTOLIC BLOOD PRESSURE: 71 MMHG | SYSTOLIC BLOOD PRESSURE: 115 MMHG | OXYGEN SATURATION: 90 % | TEMPERATURE: 98.3 F

## 2023-08-30 LAB
ANION GAP SERPL CALCULATED.3IONS-SCNC: 8 MMOL/L (ref 5–15)
BASOPHILS # BLD AUTO: 0.01 10*3/MM3 (ref 0–0.2)
BASOPHILS NFR BLD AUTO: 0.1 % (ref 0–1.5)
BUN SERPL-MCNC: 14 MG/DL (ref 8–23)
BUN/CREAT SERPL: 24.6 (ref 7–25)
CALCIUM SPEC-SCNC: 9.5 MG/DL (ref 8.6–10.5)
CHLORIDE SERPL-SCNC: 110 MMOL/L (ref 98–107)
CO2 SERPL-SCNC: 28 MMOL/L (ref 22–29)
CREAT SERPL-MCNC: 0.57 MG/DL (ref 0.57–1)
DEPRECATED RDW RBC AUTO: 47.5 FL (ref 37–54)
EGFRCR SERPLBLD CKD-EPI 2021: 96.1 ML/MIN/1.73
EOSINOPHIL # BLD AUTO: 0 10*3/MM3 (ref 0–0.4)
EOSINOPHIL NFR BLD AUTO: 0 % (ref 0.3–6.2)
ERYTHROCYTE [DISTWIDTH] IN BLOOD BY AUTOMATED COUNT: 13.4 % (ref 12.3–15.4)
GLUCOSE BLDC GLUCOMTR-MCNC: 116 MG/DL (ref 70–130)
GLUCOSE BLDC GLUCOMTR-MCNC: 85 MG/DL (ref 70–130)
GLUCOSE SERPL-MCNC: 93 MG/DL (ref 65–99)
HCT VFR BLD AUTO: 31.9 % (ref 34–46.6)
HGB BLD-MCNC: 10.4 G/DL (ref 12–15.9)
IMM GRANULOCYTES # BLD AUTO: 0.03 10*3/MM3 (ref 0–0.05)
IMM GRANULOCYTES NFR BLD AUTO: 0.4 % (ref 0–0.5)
LYMPHOCYTES # BLD AUTO: 1.02 10*3/MM3 (ref 0.7–3.1)
LYMPHOCYTES NFR BLD AUTO: 14.3 % (ref 19.6–45.3)
MCH RBC QN AUTO: 31.1 PG (ref 26.6–33)
MCHC RBC AUTO-ENTMCNC: 32.6 G/DL (ref 31.5–35.7)
MCV RBC AUTO: 95.5 FL (ref 79–97)
MONOCYTES # BLD AUTO: 0.8 10*3/MM3 (ref 0.1–0.9)
MONOCYTES NFR BLD AUTO: 11.3 % (ref 5–12)
NEUTROPHILS NFR BLD AUTO: 5.25 10*3/MM3 (ref 1.7–7)
NEUTROPHILS NFR BLD AUTO: 73.9 % (ref 42.7–76)
NRBC BLD AUTO-RTO: 0 /100 WBC (ref 0–0.2)
PLATELET # BLD AUTO: 195 10*3/MM3 (ref 140–450)
PMV BLD AUTO: 10.9 FL (ref 6–12)
POTASSIUM SERPL-SCNC: 3.5 MMOL/L (ref 3.5–5.2)
RBC # BLD AUTO: 3.34 10*6/MM3 (ref 3.77–5.28)
SODIUM SERPL-SCNC: 146 MMOL/L (ref 136–145)
WBC NRBC COR # BLD: 7.11 10*3/MM3 (ref 3.4–10.8)

## 2023-08-30 PROCEDURE — A9270 NON-COVERED ITEM OR SERVICE: HCPCS | Performed by: SURGERY

## 2023-08-30 PROCEDURE — 82948 REAGENT STRIP/BLOOD GLUCOSE: CPT

## 2023-08-30 PROCEDURE — 63710000001 SERTRALINE 100 MG TABLET: Performed by: SURGERY

## 2023-08-30 PROCEDURE — 63710000001 AMLODIPINE 10 MG TABLET: Performed by: SURGERY

## 2023-08-30 PROCEDURE — 85025 COMPLETE CBC W/AUTO DIFF WBC: CPT | Performed by: SURGERY

## 2023-08-30 PROCEDURE — 63710000001 OXYBUTYNIN XL 5 MG TABLET SUSTAINED-RELEASE 24 HOUR: Performed by: SURGERY

## 2023-08-30 PROCEDURE — 63710000001 PANTOPRAZOLE 40 MG TABLET DELAYED-RELEASE: Performed by: SURGERY

## 2023-08-30 PROCEDURE — 80048 BASIC METABOLIC PNL TOTAL CA: CPT | Performed by: SURGERY

## 2023-08-30 PROCEDURE — 63710000001 NEBIVOLOL 10 MG TABLET: Performed by: SURGERY

## 2023-08-30 PROCEDURE — 63710000001 SENNOSIDES-DOCUSATE 8.6-50 MG TABLET: Performed by: SURGERY

## 2023-08-30 PROCEDURE — 63710000001 FEBUXOSTAT 40 MG TABLET: Performed by: SURGERY

## 2023-08-30 PROCEDURE — 63710000001 HYDROCODONE-ACETAMINOPHEN 5-325 MG TABLET: Performed by: SURGERY

## 2023-08-30 PROCEDURE — 63710000001 PREGABALIN 100 MG CAPSULE: Performed by: SURGERY

## 2023-08-30 RX ORDER — HYDROCODONE BITARTRATE AND ACETAMINOPHEN 5; 325 MG/1; MG/1
1 TABLET ORAL EVERY 8 HOURS PRN
Qty: 7 TABLET | Refills: 0 | Status: SHIPPED | OUTPATIENT
Start: 2023-08-30 | End: 2023-09-02

## 2023-08-30 RX ADMIN — PANTOPRAZOLE SODIUM 40 MG: 40 TABLET, DELAYED RELEASE ORAL at 06:28

## 2023-08-30 RX ADMIN — OXYBUTYNIN CHLORIDE 5 MG: 5 TABLET, EXTENDED RELEASE ORAL at 08:46

## 2023-08-30 RX ADMIN — SERTRALINE HYDROCHLORIDE 100 MG: 100 TABLET ORAL at 06:28

## 2023-08-30 RX ADMIN — Medication 2000 MG: at 08:46

## 2023-08-30 RX ADMIN — HYDROCODONE BITARTRATE AND ACETAMINOPHEN 1 TABLET: 5; 325 TABLET ORAL at 13:27

## 2023-08-30 RX ADMIN — NEBIVOLOL 10 MG: 10 TABLET ORAL at 06:33

## 2023-08-30 RX ADMIN — SENNOSIDES AND DOCUSATE SODIUM 2 TABLET: 50; 8.6 TABLET ORAL at 08:46

## 2023-08-30 RX ADMIN — FEBUXOSTAT 40 MG: 40 TABLET, FILM COATED ORAL at 06:33

## 2023-08-30 RX ADMIN — AMLODIPINE BESYLATE 10 MG: 10 TABLET ORAL at 06:33

## 2023-08-30 RX ADMIN — PREGABALIN 100 MG: 100 CAPSULE ORAL at 08:46

## 2023-08-30 NOTE — NURSING NOTE
Discharge instructions given to patient and spouse.  All questions answered.  Pt and spouse was shown how to do drain care and measurements at home.  Pt has been up walking the hallway and doing well.

## 2023-08-30 NOTE — PROGRESS NOTES
Lissett Hatfield Alen  1950  [id number]    Surgery Progress Note    Date of visit: 8/30/2023    Subjective: Comfortable  Ambulating  No complaints of nausea  Urine output adequate    Objective:    /71 (BP Location: Left arm, Patient Position: Lying)   Pulse 68   Temp 98.3 °F (36.8 °C) (Temporal)   Resp 16   SpO2 90%     Intake/Output Summary (Last 24 hours) at 8/30/2023 0819  Last data filed at 8/30/2023 0500  Gross per 24 hour   Intake 1120 ml   Output 780 ml   Net 340 ml       CV: Regular rate and rhythm  L: normal air entry    Abd: Abdominal binder is intact  Dressing is dry  Jasper-Juares with minimal serosanguineous drainage      LABS:    Results from last 7 days   Lab Units 08/30/23  0355   WBC 10*3/mm3 7.11   HEMOGLOBIN g/dL 10.4*   HEMATOCRIT % 31.9*   PLATELETS 10*3/mm3 195     Results from last 7 days   Lab Units 08/30/23  0355   SODIUM mmol/L 146*   POTASSIUM mmol/L 3.5   CHLORIDE mmol/L 110*   CO2 mmol/L 28.0   BUN mg/dL 14   CREATININE mg/dL 0.57   CALCIUM mg/dL 9.5   GLUCOSE mg/dL 93     Results from last 7 days   Lab Units 08/30/23  0355   SODIUM mmol/L 146*   POTASSIUM mmol/L 3.5   CHLORIDE mmol/L 110*   CO2 mmol/L 28.0   BUN mg/dL 14   CREATININE mg/dL 0.57   GLUCOSE mg/dL 93   CALCIUM mg/dL 9.5     Lab Results   Lab Value Date/Time    LIPASE 15 07/20/2023 0843         Assessment/ Plan: Stable course status post repair of abdominal incisional hernias and umbilical hernia with mesh  Patient is progressing well  Plan to advance the diet   Remove Broderick catheter  Increase activity  Home later today as long as she is doing well  Resume Xarelto in the morning  Norco as needed   follow-up in the office in 1 week        Problem List Items Addressed This Visit    None        Royal Tineo MD  8/30/2023  08:19 EDT

## 2023-08-30 NOTE — PLAN OF CARE
Goal Outcome Evaluation:  Plan of Care Reviewed With: patient        Progress: improving  Outcome Evaluation: Pt rested well throughout shift. Broderick catheter patent and draining. Pt had no c/o pain, nausea, or dizziness. SCDs in place, incentive spirometer in use, and pt ambulated the halls during the shift. VSS on RA. Pt wears CPAP during sleep. No other needs identified at this time.

## 2023-08-30 NOTE — CASE MANAGEMENT/SOCIAL WORK
Case Management Discharge Note      Final Note: 'er met with patient and  at bedside. No discharge needs at this time. Plan is home with  transporting.         Selected Continued Care - Admitted Since 8/29/2023       Destination    No services have been selected for the patient.                Durable Medical Equipment    No services have been selected for the patient.                Dialysis/Infusion    No services have been selected for the patient.                Home Medical Care    No services have been selected for the patient.                Therapy    No services have been selected for the patient.                Community Resources    No services have been selected for the patient.                Community & DME    No services have been selected for the patient.                    Selected Continued Care - Prior Encounters Includes continued care and service providers with selected services from prior encounters from 5/31/2023 to 8/30/2023      Discharged on 7/25/2023 Admission date: 7/20/2023 - Discharge disposition: Home-Health Care Svc      Dialysis/Infusion       Service Provider Selected Services Address Phone Fax Patient Preferred    [facility] [address] [facility] [address] [phone] --              Home Medical Care       Service Provider Selected Services Address Phone Fax Patient Preferred    [facility] ,  Home Nursing ,  Home Rehabilitation [address] [id number] [phone] --                               Final Discharge Disposition Code: 01 - home or self-care

## 2023-09-08 ENCOUNTER — HOME CARE VISIT (OUTPATIENT)
Dept: HOME HEALTH SERVICES | Facility: HOME HEALTHCARE | Age: 73
End: 2023-09-08
Payer: MEDICARE

## 2023-09-08 VITALS
HEART RATE: 77 BPM | DIASTOLIC BLOOD PRESSURE: 74 MMHG | SYSTOLIC BLOOD PRESSURE: 111 MMHG | TEMPERATURE: 98.2 F | OXYGEN SATURATION: 99 %

## 2023-09-08 PROCEDURE — G0299 HHS/HOSPICE OF RN EA 15 MIN: HCPCS

## 2023-09-08 NOTE — HOME HEALTH
SNV for cardiopulmonary assessment/surgical incision site care. Staples still intact with no s/s of infection noted. Patient is wearing an abdominal binder from the hospital. Hernia repair surgery last week. Will plan to see patient next week as scheduled.

## 2023-09-11 ENCOUNTER — HOME CARE VISIT (OUTPATIENT)
Dept: HOME HEALTH SERVICES | Facility: HOME HEALTHCARE | Age: 73
End: 2023-09-11
Payer: MEDICARE

## 2023-09-11 VITALS
HEART RATE: 79 BPM | TEMPERATURE: 98.1 F | SYSTOLIC BLOOD PRESSURE: 118 MMHG | OXYGEN SATURATION: 99 % | DIASTOLIC BLOOD PRESSURE: 69 MMHG

## 2023-09-11 PROCEDURE — G0299 HHS/HOSPICE OF RN EA 15 MIN: HCPCS

## 2023-09-12 ENCOUNTER — HOME CARE VISIT (OUTPATIENT)
Dept: HOME HEALTH SERVICES | Facility: HOME HEALTHCARE | Age: 73
End: 2023-09-12
Payer: MEDICARE

## 2023-09-12 NOTE — HOME HEALTH
SNV for incision site care, staples intact, no s/s of infection noted, no drainage. Patient goes to see her surgeon on Thursday 9.14.23 with expectation of them being removed.   Will plan to see her next week as scheduled.

## 2023-09-13 ENCOUNTER — HOME CARE VISIT (OUTPATIENT)
Dept: HOME HEALTH SERVICES | Facility: HOME HEALTHCARE | Age: 73
End: 2023-09-13
Payer: MEDICARE

## 2023-09-13 ENCOUNTER — TELEPHONE (OUTPATIENT)
Dept: CARDIOLOGY | Facility: CLINIC | Age: 73
End: 2023-09-13
Payer: MEDICARE

## 2023-09-13 VITALS
TEMPERATURE: 98.4 F | RESPIRATION RATE: 16 BRPM | OXYGEN SATURATION: 97 % | DIASTOLIC BLOOD PRESSURE: 78 MMHG | HEART RATE: 67 BPM | SYSTOLIC BLOOD PRESSURE: 124 MMHG

## 2023-09-13 DIAGNOSIS — G47.33 OSA (OBSTRUCTIVE SLEEP APNEA): Primary | ICD-10-CM

## 2023-09-13 PROCEDURE — G0152 HHCP-SERV OF OT,EA 15 MIN: HCPCS

## 2023-09-13 NOTE — TELEPHONE ENCOUNTER
Pt last seen by you on 8/10/2022; on an AutoCPAP 12-13cm. At that time, download showed good compliance and control.  She did not want any pressure changes.  Her baseilne AHI 8.  She uses [facility].  Has follow up scheduled for 10/24/2023 @ 8:45 AM in [place]. Unable to schedule sooner appointment.  Please advise and staff will call pt back.

## 2023-09-13 NOTE — TELEPHONE ENCOUNTER
Caller: Sam Lowry    Relationship: Emergency Contact    Best call back number: [phone]    What is the best time to reach you: ANYTIME    What was the call regarding:PATIENT IS RUNNING LOW ON SUPPLIES AND THE ORDER HAS RUN OUT. PATIENT WOULD LIKE TO BE SEEN SOONER THAN HER APPOINTMENT IN OCTOBER IF POSSIBLE.

## 2023-09-14 NOTE — HOME HEALTH
Discharge Summary/Summary of Care Provided: TE/TA to increase ADL endurance, UE strength, HEP, Pt education, fall prevention, adaptive needed, vitals PRN  Patient received home health for diagnosis: Original eval >  Pt is a 72 yo female living in a one story home with .  Pt recently hospitalized at [facility] with pneumonia/staph infection.  Pt returned home on IV antibiotics. Diag hx: Bacteremia, Staph, vascular device, DM II, malunutrition, sjogrens, OA, CKD stage III, depression, mood disorder, Obstr sleep apnea, age related osteoporosis, hx pul embolism, anemia, HLD, LT use of anticogs, LT use of non insulin antidiabetic drugs, LTKR, dep on other enabling machines/devices. Pt had hernia repair at [facility] last week and returned home, still with [facility].      Current level of functional ability: mod indep  Living arrangements: one story home with   Progress towards goals and/or Were all goals met? all goals met  If not all goals met, barriers that prevented patient from meeting goals: NA  SDOH concerns (i.e. Caregiver availability, social isolation, environment, income, transportation access, food insecurity etc.) NA  Follow-up appointment plans and community resources provided:Pt remains in the care of  nursing and has agency numbers in home, will call as needed.

## 2023-09-14 NOTE — TELEPHONE ENCOUNTER
HUB OK TO READ:   Please call patient and let her know that I will send in a new prescription for her same CPAP supplies as last year.  Please continue to look at the schedule and see if we can get her in sooner.  Possibly we could call her if we had a short notice cancellation if she lives here in [place].       Attempted to call patient. Unable to leave voicemail.

## 2023-09-18 ENCOUNTER — HOME CARE VISIT (OUTPATIENT)
Dept: HOME HEALTH SERVICES | Facility: HOME HEALTHCARE | Age: 73
End: 2023-09-18
Payer: MEDICARE

## 2023-09-18 VITALS
SYSTOLIC BLOOD PRESSURE: 112 MMHG | OXYGEN SATURATION: 99 % | TEMPERATURE: 98.4 F | DIASTOLIC BLOOD PRESSURE: 69 MMHG | HEART RATE: 73 BPM

## 2023-09-18 PROCEDURE — G0299 HHS/HOSPICE OF RN EA 15 MIN: HCPCS

## 2023-09-18 NOTE — HOME HEALTH
SNV for cardiopulmonary assessment and wound care. Abdominal incision r/t hernia repair. Staples removed from abdominal incision and its now covered with steri strips. No s/s of infection.  Will plan to see next week as scheduled.

## 2023-10-24 ENCOUNTER — OFFICE VISIT (OUTPATIENT)
Dept: CARDIOLOGY | Facility: CLINIC | Age: 73
End: 2023-10-24
Payer: MEDICARE

## 2023-10-24 VITALS
WEIGHT: 187 LBS | SYSTOLIC BLOOD PRESSURE: 124 MMHG | HEART RATE: 71 BPM | BODY MASS INDEX: 30.05 KG/M2 | DIASTOLIC BLOOD PRESSURE: 70 MMHG | OXYGEN SATURATION: 97 % | HEIGHT: 66 IN

## 2023-10-24 DIAGNOSIS — G47.33 OSA ON CPAP: Primary | ICD-10-CM

## 2023-10-24 RX ORDER — BUMETANIDE 2 MG/1
TABLET ORAL
COMMUNITY
Start: 2023-08-26

## 2023-10-24 NOTE — PROGRESS NOTES
Follow-Up Sleep Consult     Date:   10/24/2023  Name: Lissett Lowry  :   1950  PCP: Konstantin Gibson MD    Chief Complaint   Patient presents with    Sleep Apnea       Subjective     History of Present Illness  Lissett Lowry is a 73 y.o. female who presents today for follow-up on PHYLLIS.This is her annual follow up appointment for Severe PHYLLIS on CPAP.     She has lost weight and is down a total of nearly #140 since . She and her  wonders is she still needs her PAP device or if she might be able to decrease her pressures.     She has Sjogren and dry mouth. She has her humidifier turned up and uses about 3/4 a tank of water each night.      Sleep and cardiac history:    Mild CAD LHC 2013    Mild LVH and EF WNL echo 2013    Minimal PAD 2015 with minimal SF a bilateral plaque on lower extremity arterial duplex    PHYLLIS baseline AHI 91 on 10/16/2009    Titration study 2013 titrated to CPAP 10 cm. Last titration study 2020 titrated to CPAP 13 cm    Current PHYLLIS therapy auto CPAP 12 to 13 cm      Current mask used is FFM     Device Functioning Well: Yes - think  may be nearing 5 years.   Mask Fit Comfortable: Yes  Air Flow Comfortable: Yes  DME Helpful for Supplies: Yes  Sleep is rested: Yes - she is sleeping about 10 hours per night and this is her normal.         Device Download:                 The patient's relevant past medical, surgical, family, and social history reviewed and updated in Epic as appropriate.    Past Medical History:   Diagnosis Date    Anemia     Chronic kidney disease, stage 3     Chronic pain in left shoulder     Depression     Diabetes mellitus     Disease of thyroid gland     as a child- underactive     Elevated cholesterol     Gout     Heart disease     Heart valve disease     sees Dr. Leahy at [facility]    History of Clostridium difficile infection     History of kidney stones     Hyperlipidemia     Hypertension     IBS (irritable bowel  "syndrome)     Incisional hernia with bowel obstruction 07/2023    Kidney stone     Obesity     PHYLLIS on CPAP     CPAP compliant, pressure setting 10    Osteoarthritis of right elbow     Osteoporosis     Primary osteoarthritis of left knee     Rotator cuff tear     Sjogren's syndrome     SLE (systemic lupus erythematosus)     in remission    SOB (shortness of breath)     Staphylococcal infection 07/2023    completed 1 month of abx infusions    Wears glasses      Past Surgical History:   Procedure Laterality Date    APPENDECTOMY      CARDIAC CATHETERIZATION      no stents    CATARACT EXTRACTION      bilateral    CHOLECYSTECTOMY      COLONOSCOPY  2017    ENDOSCOPY      HYSTERECTOMY      LUMBAR FUSION  2022    OTHER SURGICAL HISTORY      Vitrectomy    TOTAL KNEE ARTHROPLASTY Left 08/26/2019    Procedure: TOTAL KNEE ARTHROPLASTY LEFT;  Surgeon: Rony Rojas MD;  Location:  [facility];  Service: Orthopedics    VENTRAL/INCISIONAL HERNIA REPAIR N/A 8/29/2023    Procedure: REPAIR OF INCISIONAL AND UMBILICAL HERNIA REPAIR WITH MESH;  Surgeon: Royal Tineo MD;  Location:  [facility];  Service: General;  Laterality: N/A;     OB History    No obstetric history on file.       Allergies   Allergen Reactions    Lamisil [Terbinafine] Other (See Comments)     \"IT CAUSED chemically induced LUPUS\"    Duract [Bromfenac] Hives    Duricef [Cefadroxil] Hives     Has tolerated ceftriaxone, cefazolin    Erythromycin Hives    Lasix [Furosemide] Other (See Comments)     PT STATES \"IM JUST VERY SENSITIVE TO IT.\"    Mevacor [Lovastatin] Other (See Comments)     PT REPORTS \"IT JUST MADE ME FEEL SICK, I COULDN'T TOLERATE IT NOTHING SPECIFIC.\"    Sulfa Antibiotics Hives    Zocor [Simvastatin] Other (See Comments)     \"IT JUST MADE ME FEEL SICK, I COULDN'T TOLERATE IT. NOTHING SPECIFIC.\"       Prior to Admission medications    Medication Sig Start Date End Date Taking? Authorizing Provider   acetaminophen (TYLENOL) 500 MG tablet Take 1 tablet " by mouth Daily.   Yes Radha Maxwell MD   amLODIPine (NORVASC) 10 MG tablet Take 1 tablet by mouth Every Morning. Indications: HTN   Yes Radha Maxwell MD   B Complex Vitamins (VITAMIN B COMPLEX PO) Take 1 tablet by mouth Daily.   Yes Radha Maxwell MD   bumetanide (BUMEX) 2 MG tablet  8/26/23  Yes Radha Maxwell MD   dexlansoprazole (DEXILANT) 60 MG capsule Take 1 capsule by mouth Every Morning. Indications: Gastroesophageal Reflux Disease, Heartburn 7/26/23  Yes Konstantin Gibson MD   EPINEPHrine (EPIPEN) 0.3 MG/0.3ML solution auto-injector injection Inject 0.3 mL into the appropriate muscle as directed by prescriber 1 (One) Time. 7/25/23  Yes Radha Maxwell MD   ezetimibe (ZETIA) 10 MG tablet Take 1 tablet by mouth Daily. Indications: High Amount of Fats in the Blood 7/26/23  Yes Radha Maxwell MD   febuxostat (ULORIC) 40 MG tablet Take 1 tablet by mouth Every Morning. Indications: Gout   Yes Radha Maxwell MD   nebivolol (BYSTOLIC) 10 MG tablet Take 1 tablet by mouth Every Morning. Indications: High Blood Pressure Disorder   Yes Radha Maxwell MD   oxybutynin XL (DITROPAN-XL) 5 MG 24 hr tablet Take 1 tablet by mouth 2 (Two) Times a Day. Indications: Urinary Incontinence   Yes Radha Maxwell MD   Rivaroxaban (XARELTO) 2.5 MG tablet Take 1 tablet by mouth Every Morning. Indications: h/o PE 7/20/23  Yes Radha Maxwell MD   Semaglutide, 1 MG/DOSE, (OZEMPIC) 2 MG/1.5ML solution pen-injector Inject 1 mg under the skin into the appropriate area as directed 1 (One) Time Per Week. Wednesdays  Indications: Type 2 Diabetes 7/26/23  Yes Radha Maxwell MD   sertraline (ZOLOFT) 100 MG tablet Take 1 tablet by mouth Every Morning. Indications: Major Depressive Disorder   Yes Radha Maxwell MD   acetaminophen (TYLENOL) 650 MG 8 hr tablet Take 2 tablets by mouth 3 (Three) Times a Day. Indications: Fever, Pain  10/24/23  Radha Maxwell MD  "  Bumetanide (BUMEX PO) Take 2 mg by mouth Every Morning. Indications: edema  10/24/23  Provider, MD Radha   pregabalin (LYRICA) 100 MG capsule Take 1 capsule by mouth 2 (Two) Times a Day. Indications: Neuropathic Pain, Restless Leg Syndrome  10/24/23  Provider, MD Radha     Family History   Problem Relation Age of Onset    Stroke Mother     Heart disease Mother     Hypertension Mother     Heart attack Mother     Deep vein thrombosis Mother     Osteoarthritis Mother     Stroke Father     Heart disease Father     Hypertension Father     Heart attack Father     Cancer Other     Diabetes Other     Heart disease Other     Hypertension Other     Heart disease Other     Hypertension Other     Heart attack Other        Objective     Vital Signs:  /70   Pulse 71   Ht 167.6 cm (66\")   Wt 84.8 kg (187 lb)   SpO2 97%   BMI 30.18 kg/m²              Physical Exam  HENT:      Head: Normocephalic.      Nose: Nose normal.      Mouth/Throat:      Mouth: Mucous membranes are moist.   Pulmonary:      Effort: Pulmonary effort is normal.   Skin:     General: Skin is warm and dry.   Neurological:      Mental Status: She is alert and oriented to person, place, and time.   Psychiatric:         Mood and Affect: Mood normal.         Behavior: Behavior normal.         Thought Content: Thought content normal.         The following data was reviewed by: DEBORAH Turk on 10/24/2023:    PAP download reviewed: 9/23 through 10/22/2023.  30-day download.  I have reviewed and interpreted the data on the download.         Assessment and Plan     Diagnoses and all orders for this visit:    1. PHYLLIS on CPAP (Primary)  Assessment & Plan:  Baseline AHI 91.    Her last titration was in 2020 and she weighed 300 pounds.    Today her weight is 187 pounds.  She has had a greater than 120 pound weight loss since her last titration study.    Patient and  wonder if her baseline AHI has changed significantly and if she still " needs PAP therapy.    We discussed going to the sleep lab for a split study to reevaluate baseline AHI.  Versus lowering her CPAP pressure at home and reevaluating her download AHI in 1 month on new pressures.    They report that she has met her deductible's for this year and if she needed an in lab study this would be an optimal time to complete it before the end of 2023.    She is on CPAP therapy.  Download is reviewed with good control and good compliance.    She is benefiting from PAP therapy and we plan to continue PAP therapy.  Slight pressure adjustment to include auto CPAP 8 to 10 cm is sent to DME of patient's choice.    Plan follow-up in 1 month to reevaluate download AHI and for further consideration of in-lab split study.    Orders:  -     PAP Therapy        Report if any new/changing symptoms immediately         Follow Up  Return in about 1 month (around 11/24/2023) for PHYLLIS/Pressure Adjustment and recheck AHI .  Patient was given instructions and counseling regarding her condition or for health maintenance advice. Please see specific information pulled into the AVS if appropriate.

## 2023-10-24 NOTE — ASSESSMENT & PLAN NOTE
Baseline AHI 91.    Her last titration was in 2020 and she weighed 300 pounds.    Today her weight is 187 pounds.  She has had a greater than 120 pound weight loss since her last titration study.    Patient and  wonder if her baseline AHI has changed significantly and if she still needs PAP therapy.    We discussed going to the sleep lab for a split study to reevaluate baseline AHI.  Versus lowering her CPAP pressure at home and reevaluating her download AHI in 1 month on new pressures.    They report that she has met her deductible's for this year and if she needed an in lab study this would be an optimal time to complete it before the end of 2023.    She is on CPAP therapy.  Download is reviewed with good control and good compliance.    She is benefiting from PAP therapy and we plan to continue PAP therapy.  Slight pressure adjustment to include auto CPAP 8 to 10 cm is sent to DME of patient's choice.    Plan follow-up in 1 month to reevaluate download AHI and for further consideration of in-lab split study.

## 2024-02-23 NOTE — PROGRESS NOTES
"F/U Visit  Subjective   Patient ID: Lissett Lowry is a 68 y.o. female  [id number]    Chief Complaint   Patient presents with   • Back Pain     Back Pain   This is a chronic problem. The current episode started more than 1 year ago. The problem occurs constantly. The problem has been waxing and waning since onset. The pain is present in the lumbar spine. The quality of the pain is described as shooting and stabbing. The pain radiates to the right foot, right knee and right thigh. The pain is at a severity of 5/10. The pain is the same all the time. The symptoms are aggravated by bending, position and standing. Stiffness is present in the morning. Associated symptoms include bladder incontinence, leg pain, numbness, paresthesias and weakness. Pertinent negatives include no abdominal pain, chest pain, dysuria, fever or headaches. Risk factors include lack of exercise, obesity and sedentary lifestyle.     Pain and symptoms better with PT.    Past Medical History:   Diagnosis Date   • Anemia    • Chronic pain in left shoulder    • Diabetes mellitus (CMS/HCC)    • Gout    • Heart disease    • Hyperlipidemia    • Hypertension    • IBS (irritable bowel syndrome)    • Kidney stone    • Obesity    • PHYLLIS on CPAP    • Osteoarthritis of right elbow    • Osteoporosis    • Primary osteoarthritis of left knee    • Rotator cuff tear    • SLE (systemic lupus erythematosus) (CMS/HCC)    • SOB (shortness of breath)        Allergies   Allergen Reactions   • Duract [Bromfenac] Hives   • Duricef [Cefadroxil] Hives   • Erythromycin Hives   • Lamisil [Terbinafine Hcl] Other (See Comments)     \"IT CAUSED LUPUS\"   • Lasix [Furosemide] Other (See Comments)     PT STATES \"IM JUST VERY SENSITIVE TO IT.\"   • Mevacor [Lovastatin] Other (See Comments)     PT REPORTS \"IT JUST MADE ME FEEL SICK, I COULDN'T TOLERATE IT NOTHING SPECIFIC.\"   • Other Other (See Comments)     LAMOSIL CAUSED DRUG INDUCED LUPUS PER PATIENT   • Sulfa Antibiotics Hives   • " ----- Message from Abida López sent at 2/23/2024  8:36 AM CST -----  Type: Patient Call Back    Who called:pt     What is the request in detail:pt requesting to speak to nurse in regards to a pump prescribed for her leg. Please call pt     Can the clinic reply by [facility]?    Would the patient rather a call back or a response via [facility]? call    Best call back number:[phone] (home)       Additional Information:       "Sulfanilamide    • Zocor [Simvastatin] Other (See Comments)     \"IT JUST MADE ME FEEL SICK, I COULDN'T TOLERATE IT. NOTHING SPECIFIC.\"         Current Outpatient Prescriptions:   •  acetaminophen (TYLENOL 8 HOUR ARTHRITIS PAIN) 650 MG 8 hr tablet, Take 650 mg by mouth Every 8 (Eight) Hours As Needed for Mild Pain ., Disp: , Rfl:   •  AMLODIPINE BESYLATE PO, Take 5 mg by mouth Daily., Disp: , Rfl:   •  Bumetanide (BUMEX PO), Take 1 mg by mouth 2 (Two) Times a Day. PT STATES SHE FORGETS TO TAKE IT TWICE, USUALLY TAKES IT ONLY ONCE DAILY, Disp: , Rfl:   •  cyclobenzaprine (FLEXERIL) 10 MG tablet, Take 10 mg by mouth 3 (Three) Times a Day As Needed for Muscle Spasms., Disp: , Rfl:   •  famotidine (PEPCID) 20 MG tablet, Take 20 mg by mouth Daily., Disp: , Rfl:   •  fenofibrate micronized (LOFIBRA) 134 MG capsule, Take  by mouth Daily., Disp: , Rfl:   •  insulin glargine (LANTUS) 100 UNIT/ML injection, Inject  under the skin Daily. 200unit, Disp: , Rfl:   •  linagliptin (TRADJENTA) 5 MG tablet tablet, Take  by mouth Daily., Disp: , Rfl:   •  lisinopril-hydrochlorothiazide (PRINZIDE,ZESTORETIC) 20-25 MG per tablet, Take 1 tablet by mouth Daily., Disp: , Rfl:   •  Meloxicam (MOBIC PO), Take 7.5 mg by mouth As Needed., Disp: , Rfl:   •  metoprolol tartrate (LOPRESSOR) 25 MG tablet, Take 25 mg by mouth Daily., Disp: , Rfl:   •  pioglitazone (ACTOS) 45 MG tablet, Take  by mouth Daily., Disp: , Rfl:   •  pregabalin (LYRICA) 75 MG capsule, Take 1 capsule by mouth 2 (Two) Times a Day., Disp: 180 capsule, Rfl: 1  •  rosuvastatin (CRESTOR) 5 MG tablet, Take  by mouth Daily., Disp: , Rfl:   •  sertraline (ZOLOFT) 100 MG tablet, Take  by mouth Daily., Disp: , Rfl:   •  spironolactone (ALDACTONE) 25 MG tablet, Take  by mouth Take As Directed., Disp: , Rfl:   Social History   Substance Use Topics   • Smoking status: Never Smoker   • Smokeless tobacco: Never Used   • Alcohol use No     Review of Systems   Constitutional: Negative for " "activity change, appetite change, chills, diaphoresis, fatigue, fever and unexpected weight change.   HENT: Negative for congestion, dental problem, drooling, ear discharge, ear pain, facial swelling, hearing loss, mouth sores, nosebleeds, postnasal drip, rhinorrhea, sinus pressure, sneezing, sore throat, tinnitus, trouble swallowing and voice change.    Eyes: Negative for photophobia, pain, discharge, redness, itching and visual disturbance.   Respiratory: Negative for apnea, cough, choking, chest tightness, shortness of breath, wheezing and stridor.    Cardiovascular: Negative for chest pain, palpitations and leg swelling.   Gastrointestinal: Negative for abdominal distention, abdominal pain, anal bleeding, blood in stool, constipation, diarrhea, nausea, rectal pain and vomiting.   Endocrine: Negative for cold intolerance, heat intolerance, polydipsia, polyphagia and polyuria.   Genitourinary: Positive for bladder incontinence. Negative for decreased urine volume, difficulty urinating, dysuria, enuresis, flank pain, frequency, genital sores, hematuria and urgency.   Musculoskeletal: Positive for back pain. Negative for arthralgias, gait problem, joint swelling, myalgias, neck pain and neck stiffness.   Skin: Negative for color change, pallor, rash and wound.   Allergic/Immunologic: Negative for environmental allergies, food allergies and immunocompromised state.   Neurological: Positive for weakness, numbness and paresthesias. Negative for dizziness, tremors, seizures, syncope, facial asymmetry, speech difficulty, light-headedness and headaches.   Hematological: Negative for adenopathy. Does not bruise/bleed easily.   Psychiatric/Behavioral: Negative for agitation, behavioral problems, confusion, decreased concentration, dysphoric mood, hallucinations, self-injury, sleep disturbance and suicidal ideas. The patient is not nervous/anxious and is not hyperactive.        Physical Exam  Ht 167.6 cm (65.98\") "     Neurologic Exam  Gait is slow but steady, no weakness in lower extremities.    Independent Review of Radiographic Studies:   She has multilevel DDD and DJD with stenosis.    Medical Decision Making:   Would continue with conservative treatments of PT, she doing her exercises 3 D/Wk at home now, I have encouraged her to do daily and progress to the gym. She reports that she does feel better with the PT. She had a flare-up of gout in the right ankle and that along with left hip pain she has slowed down. She is currently on steroids and making improvements.She has mentioned that she may need left knee replacement. I would still recommend ongoing PT to improve her overall conditioning. She will continue with Endo and nephrology.    We will see on an as needed basis.    Amy Orr PA-C

## 2024-06-16 ENCOUNTER — APPOINTMENT (OUTPATIENT)
Facility: HOSPITAL | Age: 74
DRG: 378 | End: 2024-06-16
Payer: MEDICARE

## 2024-06-16 ENCOUNTER — HOSPITAL ENCOUNTER (INPATIENT)
Facility: HOSPITAL | Age: 74
LOS: 4 days | Discharge: HOME | DRG: 378 | End: 2024-06-20
Attending: EMERGENCY MEDICINE | Admitting: INTERNAL MEDICINE
Payer: MEDICARE

## 2024-06-16 DIAGNOSIS — K92.2 GASTROINTESTINAL HEMORRHAGE, UNSPECIFIED GASTROINTESTINAL HEMORRHAGE TYPE: Primary | ICD-10-CM

## 2024-06-16 DIAGNOSIS — D62 ABLA (ACUTE BLOOD LOSS ANEMIA): ICD-10-CM

## 2024-06-16 DIAGNOSIS — K62.5 GASTROINTESTINAL HEMORRHAGE ASSOCIATED WITH ANORECTAL SOURCE: ICD-10-CM

## 2024-06-16 LAB
ABO GROUP BLD: NORMAL
ALBUMIN SERPL-MCNC: 3.8 G/DL (ref 3.5–5.2)
ALBUMIN/GLOB SERPL: 1.7 G/DL
ALP SERPL-CCNC: 79 U/L (ref 39–117)
ALT SERPL W P-5'-P-CCNC: 16 U/L (ref 1–33)
ANION GAP SERPL CALCULATED.3IONS-SCNC: 10.7 MMOL/L (ref 5–15)
AST SERPL-CCNC: 21 U/L (ref 1–32)
BACTERIA UR QL AUTO: NORMAL /HPF
BASOPHILS # BLD AUTO: 0.02 10*3/MM3 (ref 0–0.2)
BASOPHILS # BLD AUTO: 0.03 10*3/MM3 (ref 0–0.2)
BASOPHILS NFR BLD AUTO: 0.3 % (ref 0–1.5)
BASOPHILS NFR BLD AUTO: 0.4 % (ref 0–1.5)
BILIRUB SERPL-MCNC: 0.4 MG/DL (ref 0–1.2)
BILIRUB UR QL STRIP: NEGATIVE
BLD GP AB SCN SERPL QL: NEGATIVE
BUN SERPL-MCNC: 23 MG/DL (ref 8–23)
BUN/CREAT SERPL: 26.4 (ref 7–25)
CALCIUM SPEC-SCNC: 9.8 MG/DL (ref 8.6–10.5)
CHLORIDE SERPL-SCNC: 108 MMOL/L (ref 98–107)
CLARITY UR: CLEAR
CO2 SERPL-SCNC: 25.3 MMOL/L (ref 22–29)
COLOR UR: YELLOW
CREAT SERPL-MCNC: 0.87 MG/DL (ref 0.57–1)
DEPRECATED RDW RBC AUTO: 44.5 FL (ref 37–54)
DEPRECATED RDW RBC AUTO: 46 FL (ref 37–54)
DEVELOPER EXPIRATION DATE: ABNORMAL
DEVELOPER LOT NUMBER: ABNORMAL
EGFRCR SERPLBLD CKD-EPI 2021: 70.5 ML/MIN/1.73
EOSINOPHIL # BLD AUTO: 0.01 10*3/MM3 (ref 0–0.4)
EOSINOPHIL # BLD AUTO: 0.01 10*3/MM3 (ref 0–0.4)
EOSINOPHIL NFR BLD AUTO: 0.1 % (ref 0.3–6.2)
EOSINOPHIL NFR BLD AUTO: 0.1 % (ref 0.3–6.2)
ERYTHROCYTE [DISTWIDTH] IN BLOOD BY AUTOMATED COUNT: 12.7 % (ref 12.3–15.4)
ERYTHROCYTE [DISTWIDTH] IN BLOOD BY AUTOMATED COUNT: 12.9 % (ref 12.3–15.4)
EXPIRATION DATE: ABNORMAL
FECAL OCCULT BLOOD SCREEN, POC: POSITIVE
GLOBULIN UR ELPH-MCNC: 2.2 GM/DL
GLUCOSE BLDC GLUCOMTR-MCNC: 143 MG/DL (ref 70–130)
GLUCOSE BLDC GLUCOMTR-MCNC: 164 MG/DL (ref 70–130)
GLUCOSE BLDC GLUCOMTR-MCNC: 167 MG/DL (ref 70–130)
GLUCOSE SERPL-MCNC: 93 MG/DL (ref 65–99)
GLUCOSE UR STRIP-MCNC: NEGATIVE MG/DL
HCT VFR BLD AUTO: 31.6 % (ref 34–46.6)
HCT VFR BLD AUTO: 33.2 % (ref 34–46.6)
HCT VFR BLD AUTO: 34.6 % (ref 34–46.6)
HGB BLD-MCNC: 10.4 G/DL (ref 12–15.9)
HGB BLD-MCNC: 11.2 G/DL (ref 12–15.9)
HGB BLD-MCNC: 11.8 G/DL (ref 12–15.9)
HGB UR QL STRIP.AUTO: ABNORMAL
HOLD SPECIMEN: NORMAL
HYALINE CASTS UR QL AUTO: NORMAL /LPF
IMM GRANULOCYTES # BLD AUTO: 0.01 10*3/MM3 (ref 0–0.05)
IMM GRANULOCYTES # BLD AUTO: 0.02 10*3/MM3 (ref 0–0.05)
IMM GRANULOCYTES NFR BLD AUTO: 0.1 % (ref 0–0.5)
IMM GRANULOCYTES NFR BLD AUTO: 0.3 % (ref 0–0.5)
INR PPP: 1.18 (ref 0.89–1.12)
KETONES UR QL STRIP: NEGATIVE
LEUKOCYTE ESTERASE UR QL STRIP.AUTO: NEGATIVE
LYMPHOCYTES # BLD AUTO: 1.19 10*3/MM3 (ref 0.7–3.1)
LYMPHOCYTES # BLD AUTO: 1.21 10*3/MM3 (ref 0.7–3.1)
LYMPHOCYTES NFR BLD AUTO: 13.9 % (ref 19.6–45.3)
LYMPHOCYTES NFR BLD AUTO: 17.1 % (ref 19.6–45.3)
Lab: ABNORMAL
MCH RBC QN AUTO: 31.9 PG (ref 26.6–33)
MCH RBC QN AUTO: 32.5 PG (ref 26.6–33)
MCHC RBC AUTO-ENTMCNC: 33.7 G/DL (ref 31.5–35.7)
MCHC RBC AUTO-ENTMCNC: 34.1 G/DL (ref 31.5–35.7)
MCV RBC AUTO: 93.5 FL (ref 79–97)
MCV RBC AUTO: 96.2 FL (ref 79–97)
MONOCYTES # BLD AUTO: 0.48 10*3/MM3 (ref 0.1–0.9)
MONOCYTES # BLD AUTO: 0.55 10*3/MM3 (ref 0.1–0.9)
MONOCYTES NFR BLD AUTO: 6.4 % (ref 5–12)
MONOCYTES NFR BLD AUTO: 6.8 % (ref 5–12)
NEGATIVE CONTROL: NEGATIVE
NEUTROPHILS NFR BLD AUTO: 5.32 10*3/MM3 (ref 1.7–7)
NEUTROPHILS NFR BLD AUTO: 6.75 10*3/MM3 (ref 1.7–7)
NEUTROPHILS NFR BLD AUTO: 75.4 % (ref 42.7–76)
NEUTROPHILS NFR BLD AUTO: 79.1 % (ref 42.7–76)
NITRITE UR QL STRIP: NEGATIVE
PH UR STRIP.AUTO: <=5 [PH] (ref 5–8)
PLATELET # BLD AUTO: 214 10*3/MM3 (ref 140–450)
PLATELET # BLD AUTO: 226 10*3/MM3 (ref 140–450)
PMV BLD AUTO: 10.2 FL (ref 6–12)
PMV BLD AUTO: 10.4 FL (ref 6–12)
POSITIVE CONTROL: POSITIVE
POTASSIUM SERPL-SCNC: 3.6 MMOL/L (ref 3.5–5.2)
PROT SERPL-MCNC: 6 G/DL (ref 6–8.5)
PROT UR QL STRIP: NEGATIVE
PROTHROMBIN TIME: 15.6 SECONDS (ref 12.2–14.5)
RBC # BLD AUTO: 3.45 10*6/MM3 (ref 3.77–5.28)
RBC # BLD AUTO: 3.7 10*6/MM3 (ref 3.77–5.28)
RBC # UR STRIP: NORMAL /HPF
REF LAB TEST METHOD: NORMAL
RH BLD: POSITIVE
SODIUM SERPL-SCNC: 144 MMOL/L (ref 136–145)
SP GR UR STRIP: 1.05 (ref 1–1.03)
SQUAMOUS #/AREA URNS HPF: NORMAL /HPF
T&S EXPIRATION DATE: NORMAL
UROBILINOGEN UR QL STRIP: ABNORMAL
WBC # UR STRIP: NORMAL /HPF
WBC NRBC COR # BLD AUTO: 7.06 10*3/MM3 (ref 3.4–10.8)
WBC NRBC COR # BLD AUTO: 8.54 10*3/MM3 (ref 3.4–10.8)
WHOLE BLOOD HOLD COAG: NORMAL
WHOLE BLOOD HOLD SPECIMEN: NORMAL

## 2024-06-16 PROCEDURE — 86901 BLOOD TYPING SEROLOGIC RH(D): CPT | Performed by: STUDENT IN AN ORGANIZED HEALTH CARE EDUCATION/TRAINING PROGRAM

## 2024-06-16 PROCEDURE — 36415 COLL VENOUS BLD VENIPUNCTURE: CPT

## 2024-06-16 PROCEDURE — 99222 1ST HOSP IP/OBS MODERATE 55: CPT | Performed by: STUDENT IN AN ORGANIZED HEALTH CARE EDUCATION/TRAINING PROGRAM

## 2024-06-16 PROCEDURE — 82948 REAGENT STRIP/BLOOD GLUCOSE: CPT

## 2024-06-16 PROCEDURE — 25810000003 SODIUM CHLORIDE 0.9 % SOLUTION: Performed by: STUDENT IN AN ORGANIZED HEALTH CARE EDUCATION/TRAINING PROGRAM

## 2024-06-16 PROCEDURE — 25810000003 LACTATED RINGERS PER 1000 ML: Performed by: STUDENT IN AN ORGANIZED HEALTH CARE EDUCATION/TRAINING PROGRAM

## 2024-06-16 PROCEDURE — 99285 EMERGENCY DEPT VISIT HI MDM: CPT

## 2024-06-16 PROCEDURE — 82270 OCCULT BLOOD FECES: CPT | Performed by: EMERGENCY MEDICINE

## 2024-06-16 PROCEDURE — 85025 COMPLETE CBC W/AUTO DIFF WBC: CPT | Performed by: EMERGENCY MEDICINE

## 2024-06-16 PROCEDURE — 85610 PROTHROMBIN TIME: CPT | Performed by: EMERGENCY MEDICINE

## 2024-06-16 PROCEDURE — 85014 HEMATOCRIT: CPT | Performed by: STUDENT IN AN ORGANIZED HEALTH CARE EDUCATION/TRAINING PROGRAM

## 2024-06-16 PROCEDURE — G0378 HOSPITAL OBSERVATION PER HR: HCPCS

## 2024-06-16 PROCEDURE — 85018 HEMOGLOBIN: CPT | Performed by: STUDENT IN AN ORGANIZED HEALTH CARE EDUCATION/TRAINING PROGRAM

## 2024-06-16 PROCEDURE — 63710000001 INSULIN REGULAR HUMAN PER 5 UNITS: Performed by: STUDENT IN AN ORGANIZED HEALTH CARE EDUCATION/TRAINING PROGRAM

## 2024-06-16 PROCEDURE — 86923 COMPATIBILITY TEST ELECTRIC: CPT

## 2024-06-16 PROCEDURE — 86850 RBC ANTIBODY SCREEN: CPT | Performed by: STUDENT IN AN ORGANIZED HEALTH CARE EDUCATION/TRAINING PROGRAM

## 2024-06-16 PROCEDURE — 80053 COMPREHEN METABOLIC PANEL: CPT | Performed by: EMERGENCY MEDICINE

## 2024-06-16 PROCEDURE — 74174 CTA ABD&PLVS W/CONTRAST: CPT

## 2024-06-16 PROCEDURE — 81001 URINALYSIS AUTO W/SCOPE: CPT | Performed by: STUDENT IN AN ORGANIZED HEALTH CARE EDUCATION/TRAINING PROGRAM

## 2024-06-16 PROCEDURE — 25510000001 IOPAMIDOL PER 1 ML: Performed by: EMERGENCY MEDICINE

## 2024-06-16 PROCEDURE — 86900 BLOOD TYPING SEROLOGIC ABO: CPT | Performed by: STUDENT IN AN ORGANIZED HEALTH CARE EDUCATION/TRAINING PROGRAM

## 2024-06-16 RX ORDER — AMLODIPINE BESYLATE 10 MG/1
10 TABLET ORAL EVERY MORNING
Status: DISCONTINUED | OUTPATIENT
Start: 2024-06-17 | End: 2024-06-20 | Stop reason: HOSPADM

## 2024-06-16 RX ORDER — SODIUM CHLORIDE 9 MG/ML
40 INJECTION, SOLUTION INTRAVENOUS AS NEEDED
Status: DISCONTINUED | OUTPATIENT
Start: 2024-06-16 | End: 2024-06-20 | Stop reason: HOSPADM

## 2024-06-16 RX ORDER — NICOTINE POLACRILEX 4 MG
15 LOZENGE BUCCAL
Status: DISCONTINUED | OUTPATIENT
Start: 2024-06-16 | End: 2024-06-20 | Stop reason: HOSPADM

## 2024-06-16 RX ORDER — BUMETANIDE 2 MG/1
2 TABLET ORAL DAILY
Status: DISCONTINUED | OUTPATIENT
Start: 2024-06-16 | End: 2024-06-20 | Stop reason: HOSPADM

## 2024-06-16 RX ORDER — NITROGLYCERIN 0.4 MG/1
0.4 TABLET SUBLINGUAL
Status: DISCONTINUED | OUTPATIENT
Start: 2024-06-16 | End: 2024-06-20 | Stop reason: HOSPADM

## 2024-06-16 RX ORDER — SODIUM CHLORIDE 0.9 % (FLUSH) 0.9 %
10 SYRINGE (ML) INJECTION EVERY 12 HOURS SCHEDULED
Status: DISCONTINUED | OUTPATIENT
Start: 2024-06-16 | End: 2024-06-20 | Stop reason: HOSPADM

## 2024-06-16 RX ORDER — SODIUM CHLORIDE, SODIUM LACTATE, POTASSIUM CHLORIDE, CALCIUM CHLORIDE 600; 310; 30; 20 MG/100ML; MG/100ML; MG/100ML; MG/100ML
75 INJECTION, SOLUTION INTRAVENOUS CONTINUOUS
Status: ACTIVE | OUTPATIENT
Start: 2024-06-16 | End: 2024-06-17

## 2024-06-16 RX ORDER — SODIUM CHLORIDE 0.9 % (FLUSH) 0.9 %
10 SYRINGE (ML) INJECTION AS NEEDED
Status: DISCONTINUED | OUTPATIENT
Start: 2024-06-16 | End: 2024-06-20 | Stop reason: HOSPADM

## 2024-06-16 RX ORDER — MELOXICAM 7.5 MG/1
7.5 TABLET ORAL DAILY
COMMUNITY
End: 2024-06-20 | Stop reason: HOSPADM

## 2024-06-16 RX ORDER — NEBIVOLOL 2.5 MG/1
10 TABLET ORAL EVERY MORNING
Status: DISCONTINUED | OUTPATIENT
Start: 2024-06-17 | End: 2024-06-20 | Stop reason: HOSPADM

## 2024-06-16 RX ORDER — DEXTROSE MONOHYDRATE 25 G/50ML
25 INJECTION, SOLUTION INTRAVENOUS
Status: DISCONTINUED | OUTPATIENT
Start: 2024-06-16 | End: 2024-06-20 | Stop reason: HOSPADM

## 2024-06-16 RX ORDER — SERTRALINE HYDROCHLORIDE 100 MG/1
100 TABLET, FILM COATED ORAL EVERY MORNING
Status: DISCONTINUED | OUTPATIENT
Start: 2024-06-17 | End: 2024-06-20 | Stop reason: HOSPADM

## 2024-06-16 RX ORDER — PANTOPRAZOLE SODIUM 40 MG/10ML
40 INJECTION, POWDER, LYOPHILIZED, FOR SOLUTION INTRAVENOUS EVERY 12 HOURS SCHEDULED
Status: DISCONTINUED | OUTPATIENT
Start: 2024-06-16 | End: 2024-06-20 | Stop reason: HOSPADM

## 2024-06-16 RX ORDER — IBUPROFEN 600 MG/1
1 TABLET ORAL
Status: DISCONTINUED | OUTPATIENT
Start: 2024-06-16 | End: 2024-06-20 | Stop reason: HOSPADM

## 2024-06-16 RX ORDER — OXYBUTYNIN CHLORIDE 5 MG/1
5 TABLET, EXTENDED RELEASE ORAL 2 TIMES DAILY
Status: DISCONTINUED | OUTPATIENT
Start: 2024-06-16 | End: 2024-06-18

## 2024-06-16 RX ORDER — ONDANSETRON 2 MG/ML
4 INJECTION INTRAMUSCULAR; INTRAVENOUS EVERY 6 HOURS PRN
Status: DISCONTINUED | OUTPATIENT
Start: 2024-06-16 | End: 2024-06-20 | Stop reason: HOSPADM

## 2024-06-16 RX ADMIN — IOPAMIDOL 100 ML: 755 INJECTION, SOLUTION INTRAVENOUS at 13:15

## 2024-06-16 RX ADMIN — INSULIN HUMAN 2 UNITS: 100 INJECTION, SOLUTION PARENTERAL at 23:13

## 2024-06-16 RX ADMIN — Medication 10 ML: at 20:38

## 2024-06-16 RX ADMIN — SODIUM CHLORIDE, POTASSIUM CHLORIDE, SODIUM LACTATE AND CALCIUM CHLORIDE 75 ML/HR: 600; 310; 30; 20 INJECTION, SOLUTION INTRAVENOUS at 23:03

## 2024-06-16 RX ADMIN — PANTOPRAZOLE SODIUM 40 MG: 40 INJECTION, POWDER, FOR SOLUTION INTRAVENOUS at 20:37

## 2024-06-16 RX ADMIN — SODIUM CHLORIDE 1000 ML: 900 INJECTION, SOLUTION INTRAVENOUS at 18:45

## 2024-06-16 NOTE — H&P
[facility] Medicine Services  HISTORY AND PHYSICAL    Patient Name: Lissett Lowry  : 1950  MRN: [id number]  Primary Care Physician: Konstantin Gibson MD  Date of admission: 2024      Subjective   Subjective     Chief Complaint:  Bloody bowel movements     HPI:  Lissett Lowry is a 73 y.o. female with past medical history significant for type 2 diabetes mellitus, hemorrhoids, hypertension, pulmonary embolism on Xarelto, PHYLLIS, was admitted as a transfer from [facility] ER for bright red blood per rectum.  Patient reports she had been feeling fine over the preceding week without any abnormalities until this morning when she awoke and had a bowel movement, noting blood on the toilet paper when wiping.  She states that the stool was brown but had significant bright red blood on the toilet paper.  She states that this continued multiple times throughout the day, ultimately progressing to only bright red bloody output.  She does have a history of hemorrhoids but states that this is different, denies any rectal pain.  She denies any chest pain, lightheadedness.   states that she has been chronically dizzy and has been progressively short of breath over the past couple of weeks.  Patient states that she took Xarelto this morning which is for her history of pulmonary embolism approximately 3 years ago, provoked by surgery and immobility.  States her last colonoscopy was approximately 5 years ago and has never had any concerning pathology and only polyps removed.  Patient additionally notes that she has had some mild burning with urination and urinary frequency.      Personal History     Past Medical History:   Diagnosis Date    Anemia     Chronic kidney disease, stage 3     Chronic pain in left shoulder     Depression     Diabetes mellitus     Disease of thyroid gland     as a child- underactive     Elevated cholesterol     Gout     Heart disease     Heart valve  disease     sees Dr. Leahy at [facility]    History of Clostridium difficile infection     History of kidney stones     Hyperlipidemia     Hypertension     IBS (irritable bowel syndrome)     Incisional hernia with bowel obstruction 07/2023    Kidney stone     Obesity     PHYLLIS on CPAP     CPAP compliant, pressure setting 10    Osteoarthritis of right elbow     Osteoporosis     Primary osteoarthritis of left knee     Rotator cuff tear     Sjogren's syndrome     SLE (systemic lupus erythematosus)     in remission    SOB (shortness of breath)     Staphylococcal infection 07/2023    completed 1 month of abx infusions    Wears glasses            Past Surgical History:   Procedure Laterality Date    APPENDECTOMY      CARDIAC CATHETERIZATION      no stents    CATARACT EXTRACTION      bilateral    CHOLECYSTECTOMY      COLONOSCOPY  2017    ENDOSCOPY      HYSTERECTOMY      LUMBAR FUSION  2022    OTHER SURGICAL HISTORY      Vitrectomy    TOTAL KNEE ARTHROPLASTY Left 08/26/2019    Procedure: TOTAL KNEE ARTHROPLASTY LEFT;  Surgeon: Rony Rojas MD;  Location:  [facility];  Service: Orthopedics    VENTRAL/INCISIONAL HERNIA REPAIR N/A 8/29/2023    Procedure: REPAIR OF INCISIONAL AND UMBILICAL HERNIA REPAIR WITH MESH;  Surgeon: Royal Tineo MD;  Location:  [facility];  Service: General;  Laterality: N/A;       Family History: family history includes Cancer in an other family member; Deep vein thrombosis in her mother; Diabetes in an other family member; Heart attack in her father, mother, and another family member; Heart disease in her father, mother, and other family members; Hypertension in her father, mother, and other family members; Osteoarthritis in her mother; Stroke in her father and mother.     Social History:  reports that she has never smoked. She has never used smokeless tobacco. She reports that she does not drink alcohol and does not use drugs.  Social History     Social History Narrative    Not on file  "      Medications:  Available home medication information reviewed.  B Complex Vitamins, EPINEPHrine, Semaglutide (1 MG/DOSE), acetaminophen, amLODIPine, bumetanide, dexlansoprazole, ezetimibe, febuxostat, meloxicam, nebivolol, oxybutynin XL, and sertraline    Allergies   Allergen Reactions    Lamisil [Terbinafine] Other (See Comments)     \"IT CAUSED chemically induced LUPUS\"    Duract [Bromfenac] Hives    Duricef [Cefadroxil] Hives     Has tolerated ceftriaxone, cefazolin    Erythromycin Hives    Lasix [Furosemide] Other (See Comments)     PT STATES \"IM JUST VERY SENSITIVE TO IT.\"    Mevacor [Lovastatin] Other (See Comments)     PT REPORTS \"IT JUST MADE ME FEEL SICK, I COULDN'T TOLERATE IT NOTHING SPECIFIC.\"    Sulfa Antibiotics Hives    Zocor [Simvastatin] Other (See Comments)     \"IT JUST MADE ME FEEL SICK, I COULDN'T TOLERATE IT. NOTHING SPECIFIC.\"         Objective   Objective     Vital Signs:   Temp:  [98 °F (36.7 °C)-98.2 °F (36.8 °C)] 98 °F (36.7 °C)  Heart Rate:  [63-70] 67  Resp:  [16-20] 17  BP: ()/(55-81) 96/55       Physical Exam   General appearance: alert, awake, oriented, no acute distress   Head/Eyes: atraumatic, normocephalic, non-injected conjunctivae, EOMI  Cardiovascular: RRR, no murmurs or rubs, radial pulse full 2/4 BL   Respiratory: CTAB, no crackles or wheezes   Abdomen: soft, right lower quadrant tenderness to palpation, no organomegaly, bowel sounds normoactive   Musculoskeletal: moves all 4 extremities  Neuro/CNS: alert and oriented x3, normal speech, strength and sensation grossly intact  Skin: dry, normal color, normal temperature, no rash  Psychiatry: normal affect, normal judgment/insight, normal mood      Result Review:  I have personally reviewed the results from the time of this admission to 6/16/2024 20:46 EDT and agree with these findings:  [x]  Laboratory list / accordion  []  Microbiology  [x]  Radiology  []  EKG/Telemetry   []  Cardiology/Vascular   []  Pathology  [x]  " Old records  []  Other:  Most notable findings include: Anemia, RLQ TTP       LAB RESULTS:      Lab 06/16/24  1619 06/16/24  1331 06/16/24  1146   WBC  --  8.54 7.06   HEMOGLOBIN 10.4* 11.2* 11.8*   HEMATOCRIT 31.6* 33.2* 34.6   PLATELETS  --  214 226   NEUTROS ABS  --  6.75 5.32   IMMATURE GRANS (ABS)  --  0.01 0.02   LYMPHS ABS  --  1.19 1.21   MONOS ABS  --  0.55 0.48   EOS ABS  --  0.01 0.01   MCV  --  96.2 93.5   PROTIME  --   --  15.6*   INR  --   --  1.18*         Lab 06/16/24  1146   SODIUM 144   POTASSIUM 3.6   CHLORIDE 108*   CO2 25.3   ANION GAP 10.7   BUN 23   CREATININE 0.87   EGFR 70.5   GLUCOSE 93   CALCIUM 9.8         Lab 06/16/24  1146   TOTAL PROTEIN 6.0   ALBUMIN 3.8   GLOBULIN 2.2   ALT (SGPT) 16   AST (SGOT) 21   BILIRUBIN 0.4   ALK PHOS 79                     UA          7/20/2023    12:09   Urinalysis   Squamous Epithelial Cells, UA 21-30    Specific Gravity, UA 1.022    Ketones, UA Trace    Blood, UA Negative    Leukocytes, UA Moderate (2+)    Nitrite, UA Negative    RBC, UA 3-6    WBC, UA 13-20    Bacteria, UA 3+        Microbiology Results (last 10 days)       ** No results found for the last 240 hours. **            CT Angiogram Abdomen Pelvis    Result Date: 6/16/2024  CT ANGIOGRAM ABDOMEN PELVIS Date of Exam: 6/16/2024 12:59 PM EDT Indication: rectal bleeding. Comparison: 7/20/2023 Technique: CTA of the abdomen and pelvis was performed after the uneventful intravenous administration of 100 cc Isovue-370 IV contrast . Reconstructed coronal and sagittal images were also obtained. In addition, a 3-D volume rendered image was created for interpretation. Automated exposure control and iterative reconstruction methods were used. Findings: The descending thoracic aorta is normal. The origins of the celiac axis and superior mesenteric arteries are patent. Severe narrowing of the left renal artery origin. Mild atheromatous disease of the origin of the right renal artery. Inferior mesenteric  artery is patent. No aneurysm. The common iliac arteries are normal. Calcified granulomata at the lung bases. Calcified granulomata within the liver and spleen. The pancreas is normal. Previous cholecystectomy. 2.5 cm left adrenal benign adenoma. There is a filling 0.8 cm exophytic hyperdense left renal lesion consistent with a proteinaceous or hemorrhagic cyst. 1.6 cm exophytic left renal cyst. 2 cm right renal cyst. No hydroureteronephrosis. The urinary bladder is normal. The small bowel is nonobstructed. Colonic diverticulosis without evidence of diverticulitis. Appendectomy. No ascites or pneumoperitoneum. No adenopathy. Degenerative changes of the hips and lumbar spine. Prior lumbar spine fusion     Impression: Colonic diverticulosis without evidence of diverticulitis. No etiology for the patient's rectal bleeding is definitively identified. Electronically Signed: Max Sanchez MD  6/16/2024 1:27 PM EDT  Workstation ID: [id number]     Results for orders placed during the hospital encounter of 07/20/23    Adult Transesophageal Echo 3D (JENNIFER) W/ Cont If Necessary Per Protocol    Interpretation Summary    The aortic valve is abnormal in structure. There is calcification of the aortic valve. The aortic valve appears trileaflet. Trace aortic valve regurgitation is present. No aortic valve stenosis is present.    There is moderate calcification of the mitral valve posterior leaflet. No evidence of a mitral valve mass is present. Mild mitral valve regurgitation is present. No significant mitral valve stenosis is present.    The tricuspid valve is structurally normal with no significant stenosis present. Mild tricuspid valve regurgitation is present. No TV vegetation.    The pulmonic valve is structurally normal with no significant stenosis present. There is trace pulmonic valve regurgitation present.    There is no evidence of pericardial effusion. There is a well circumscribed 2.7 x 1.6cm mass in the L AV groove  consistent with epicardial fat      Assessment & Plan   Assessment & Plan       GI bleed    Controlled type 2 diabetes mellitus without complication, with long-term current use of insulin    DDD (degenerative disc disease), lumbar    PHYLLIS on CPAP    Sjogren syndrome, unspecified        Acute blood loss anemia  Acute lower GI bleed  Hematochezia   -Consult GI  -Trend Hbg, type and screen, transfuse for Hbg <7   -CL diet then NPO after midnight   -PPI   -IVF resuscitation   -CBC, BMP in AM     Type II DM  -Accuchecks ACHS w SSI   -On ozempic     Hx PE  -Provoked due to immobility after back surgery approximately 3 years ago  -Will discontinue     Dysuria  Hx frequent UTIs  -Obtain UA    Mild neurocognitive disorder  -Diagnosis made from Neurology 4/2024 based on chart review   - assists   -PT/OT when appropriate     HTN   -On bumex, norvasc, bystolic outpatient, will hold     Gout  -continue febuxostat once PO tolerated     PHYLLIS  -CPAP at night        VTE Prophylaxis:  Mechanical VTE prophylaxis orders are present.          CODE STATUS:    Code Status and Medical Interventions:   Ordered at: 06/16/24 1834     Level Of Support Discussed With:    Patient     Code Status (Patient has no pulse and is not breathing):    No CPR (Do Not Attempt to Resuscitate)     Medical Interventions (Patient has pulse or is breathing):    Full Support       Expected Discharge   Expected Discharge Date: 6/18/2024; Expected Discharge Time:      Tommie Moseley DO  06/16/24

## 2024-06-16 NOTE — ED NOTES
Lissett Lowry    Nursing Report ED to Floor:  Mental status: aox4  Ambulatory status: independent  Oxygen Therapy:  ra  Cardiac Rhythm: nsr  Admitted from: home  Safety Concerns: falls d/t weakness  Social Issues: unknown  ED Room #:  4    ED Nurse Phone Extension - [phone] or may call [phone].      HPI:   Chief Complaint   Patient presents with    Rectal Bleeding       Past Medical History:  Past Medical History:   Diagnosis Date    Anemia     Chronic kidney disease, stage 3     Chronic pain in left shoulder     Depression     Diabetes mellitus     Disease of thyroid gland     as a child- underactive     Elevated cholesterol     Gout     Heart disease     Heart valve disease     sees Dr. Leahy at [facility]    History of Clostridium difficile infection     History of kidney stones     Hyperlipidemia     Hypertension     IBS (irritable bowel syndrome)     Incisional hernia with bowel obstruction 07/2023    Kidney stone     Obesity     PHYLLIS on CPAP     CPAP compliant, pressure setting 10    Osteoarthritis of right elbow     Osteoporosis     Primary osteoarthritis of left knee     Rotator cuff tear     Sjogren's syndrome     SLE (systemic lupus erythematosus)     in remission    SOB (shortness of breath)     Staphylococcal infection 07/2023    completed 1 month of abx infusions    Wears glasses         Past Surgical History:  Past Surgical History:   Procedure Laterality Date    APPENDECTOMY      CARDIAC CATHETERIZATION      no stents    CATARACT EXTRACTION      bilateral    CHOLECYSTECTOMY      COLONOSCOPY  2017    ENDOSCOPY      HYSTERECTOMY      LUMBAR FUSION  2022    OTHER SURGICAL HISTORY      Vitrectomy    TOTAL KNEE ARTHROPLASTY Left 08/26/2019    Procedure: TOTAL KNEE ARTHROPLASTY LEFT;  Surgeon: Rony Rojas MD;  Location: [facility] OR;  Service: Orthopedics    VENTRAL/INCISIONAL HERNIA REPAIR N/A 8/29/2023    Procedure: REPAIR OF INCISIONAL AND UMBILICAL HERNIA REPAIR WITH MESH;  Surgeon:  "Royal Tineo MD;  Location: [facility];  Service: General;  Laterality: N/A;        Admitting Doctor:   No admitting provider for patient encounter.    Consulting Provider(s):  Consults       No orders found from 5/18/2024 to 6/17/2024.             Admitting Diagnosis:   The encounter diagnosis was Gastrointestinal hemorrhage, unspecified gastrointestinal hemorrhage type.    Most Recent Vitals:   Vitals:    06/16/24 1037 06/16/24 1116 06/16/24 1217 06/16/24 1259   BP: 146/74 138/72 121/78 109/80   BP Location: Left arm   Left arm   Patient Position: Sitting   Sitting   Pulse: 70 65 70 66   Resp: 16 20 18 18   Temp: 98.2 °F (36.8 °C)      TempSrc: Oral      SpO2: 95% 98%  97%   Weight: 74.8 kg (165 lb)      Height: 167.6 cm (66\")          Active LDAs/IV Access:   Lines, Drains & Airways       Active LDAs       Name Placement date Placement time Site Days    Peripheral IV 06/16/24 1159 Anterior;Left Forearm 06/16/24  1159  Forearm  less than 1                    Labs (abnormal labs have a star):   Labs Reviewed   COMPREHENSIVE METABOLIC PANEL - Abnormal; Notable for the following components:       Result Value    Chloride 108 (*)     BUN/Creatinine Ratio 26.4 (*)     All other components within normal limits    Narrative:     GFR Normal >60  Chronic Kidney Disease <60  Kidney Failure <15    The GFR formula is only valid for adults with stable renal function between ages 18 and 70.   PROTIME-INR - Abnormal; Notable for the following components:    Protime 15.6 (*)     INR 1.18 (*)     All other components within normal limits   CBC WITH AUTO DIFFERENTIAL - Abnormal; Notable for the following components:    RBC 3.70 (*)     Hemoglobin 11.8 (*)     Lymphocyte % 17.1 (*)     Eosinophil % 0.1 (*)     All other components within normal limits   POCT OCCULT BLOOD STOOL (ED ONLY) - Abnormal; Notable for the following components:    Fecal Occult Blood Positive (*)     All other components within normal limits   RAINBOW " DRAW    Narrative:     The following orders were created for panel order [place] Draw.  Procedure                               Abnormality         Status                     ---------                               -----------         ------                     Green Top (Gel)[[id number]]                                  Final result               Lavender Top[[id number]]                                     Final result               Gold Top - SST[[id number]]                                   Final result               Gray Top[[id number]]                                         Final result               Light Blue Top[[id number]]                                   Final result                 Please view results for these tests on the individual orders.   URINALYSIS W/ MICROSCOPIC IF INDICATED (NO CULTURE)   CBC WITH AUTO DIFFERENTIAL   CBC AND DIFFERENTIAL    Narrative:     The following orders were created for panel order CBC & Differential.  Procedure                               Abnormality         Status                     ---------                               -----------         ------                     CBC Auto Differential[[id number]]        Abnormal            Final result                 Please view results for these tests on the individual orders.   GREEN TOP   LAVENDER TOP   GOLD TOP - SST   GRAY TOP   LIGHT BLUE TOP       Meds Given in ED:   Medications   sodium chloride 0.9 % flush 10 mL (has no administration in time range)   iopamidol (ISOVUE-370) 76 % injection 100 mL (100 mL Intravenous Given 6/16/24 1315)           Last NIH score:                                                          Dysphagia screening results:        [place] Coma Scale:  No data recorded     CIWA:        Restraint Type:            Isolation Status:  No active isolations

## 2024-06-16 NOTE — PLAN OF CARE
Patient A&O X4. On room air. 2 occurrences of bight red blood from rectum. Patient reports mild abdominal cramping. Urinated twice.     Problem: Adult Inpatient Plan of Care  Goal: Plan of Care Review  Outcome: Ongoing, Progressing  Goal: Patient-Specific Goal (Individualized)  Outcome: Ongoing, Progressing  Goal: Absence of Hospital-Acquired Illness or Injury  Outcome: Ongoing, Progressing  Intervention: Identify and Manage Fall Risk  Recent Flowsheet Documentation  Taken 6/16/2024 1433 by Suzanne Mei RN  Safety Promotion/Fall Prevention:   activity supervised   assistive device/personal items within reach   clutter free environment maintained   room organization consistent   safety round/check completed  Intervention: Prevent Skin Injury  Recent Flowsheet Documentation  Taken 6/16/2024 1433 by Suzanne Mei RN  Body Position: position changed independently  Skin Protection:   adhesive use limited   incontinence pads utilized   transparent dressing maintained   tubing/devices free from skin contact  Intervention: Prevent and Manage VTE (Venous Thromboembolism) Risk  Recent Flowsheet Documentation  Taken 6/16/2024 1555 by Suzanne Mei RN  Activity Management: ambulated to bathroom  Taken 6/16/2024 1433 by Suzanne Mei RN  Range of Motion: active ROM (range of motion) encouraged  Intervention: Prevent Infection  Recent Flowsheet Documentation  Taken 6/16/2024 1433 by Suzanne Mei RN  Infection Prevention: environmental surveillance performed  Goal: Optimal Comfort and Wellbeing  Outcome: Ongoing, Progressing  Intervention: Provide Person-Centered Care  Recent Flowsheet Documentation  Taken 6/16/2024 1433 by Suzanne Mei RN  Trust Relationship/Rapport:   care explained   choices provided  Goal: Readiness for Transition of Care  Outcome: Ongoing, Progressing  Intervention: Mutually Develop Transition Plan  Recent Flowsheet Documentation  Taken 6/16/2024 1438 by Suzanne Mei  RN  Transportation Anticipated: family or friend will provide  Patient/Family Anticipated Services at Transition: none  Patient/Family Anticipates Transition to: home with family  Taken 6/16/2024 1436 by Suzanne Mei, RN  Equipment Currently Used at Home: walker, standard   Goal Outcome Evaluation:

## 2024-06-16 NOTE — FSED PROVIDER NOTE
Subjective  History of Present Illness:    Pt reports 3 BM's that were normal color.  Pt had blood in each BM. Pt reports mild right lower crampy abdominal pain      Nurses Notes reviewed and agree, including vitals, allergies, social history and prior medical history.     REVIEW OF SYSTEMS: All systems reviewed and not pertinent unless noted.  Review of Systems   Constitutional:  Positive for fatigue.   Gastrointestinal:  Positive for blood in stool.       Past Medical History:   Diagnosis Date    Anemia     Chronic kidney disease, stage 3     Chronic pain in left shoulder     Depression     Diabetes mellitus     Disease of thyroid gland     as a child- underactive     Elevated cholesterol     Gout     Heart disease     Heart valve disease     sees Dr. Leahy at [facility]    History of Clostridium difficile infection     History of kidney stones     Hyperlipidemia     Hypertension     IBS (irritable bowel syndrome)     Incisional hernia with bowel obstruction 07/2023    Kidney stone     Obesity     PHYLLIS on CPAP     CPAP compliant, pressure setting 10    Osteoarthritis of right elbow     Osteoporosis     Primary osteoarthritis of left knee     Rotator cuff tear     Sjogren's syndrome     SLE (systemic lupus erythematosus)     in remission    SOB (shortness of breath)     Staphylococcal infection 07/2023    completed 1 month of abx infusions    Wears glasses        Allergies:    Lamisil [terbinafine], Duract [bromfenac], Duricef [cefadroxil], Erythromycin, Lasix [furosemide], Mevacor [lovastatin], Sulfa antibiotics, and Zocor [simvastatin]      Past Surgical History:   Procedure Laterality Date    APPENDECTOMY      CARDIAC CATHETERIZATION      no stents    CATARACT EXTRACTION      bilateral    CHOLECYSTECTOMY      COLONOSCOPY  2017    ENDOSCOPY      HYSTERECTOMY      LUMBAR FUSION  2022    OTHER SURGICAL HISTORY      Vitrectomy    TOTAL KNEE ARTHROPLASTY Left 08/26/2019    Procedure: TOTAL KNEE ARTHROPLASTY LEFT;   "Surgeon: Rony Rojas MD;  Location:  [facility] OR;  Service: Orthopedics    VENTRAL/INCISIONAL HERNIA REPAIR N/A 8/29/2023    Procedure: REPAIR OF INCISIONAL AND UMBILICAL HERNIA REPAIR WITH MESH;  Surgeon: Royal Tineo MD;  Location:  [facility] OR;  Service: General;  Laterality: N/A;         Social History     Socioeconomic History    Marital status:    Tobacco Use    Smoking status: Never    Smokeless tobacco: Never   Vaping Use    Vaping status: Never Used   Substance and Sexual Activity    Alcohol use: No    Drug use: No    Sexual activity: Defer         Family History   Problem Relation Age of Onset    Stroke Mother     Heart disease Mother     Hypertension Mother     Heart attack Mother     Deep vein thrombosis Mother     Osteoarthritis Mother     Stroke Father     Heart disease Father     Hypertension Father     Heart attack Father     Cancer Other     Diabetes Other     Heart disease Other     Hypertension Other     Heart disease Other     Hypertension Other     Heart attack Other        Objective  Physical Exam:  /74 (BP Location: Left arm, Patient Position: Sitting)   Pulse 70   Temp 98.2 °F (36.8 °C) (Oral)   Resp 16   Ht 167.6 cm (66\")   Wt 74.8 kg (165 lb)   SpO2 95%   BMI 26.63 kg/m²      Physical Exam  Vitals and nursing note reviewed.   Constitutional:       Appearance: Normal appearance.   HENT:      Right Ear: External ear normal.      Left Ear: External ear normal.      Nose: Nose normal.      Mouth/Throat:      Mouth: Mucous membranes are moist.   Eyes:      Extraocular Movements: Extraocular movements intact.   Cardiovascular:      Rate and Rhythm: Normal rate and regular rhythm.   Pulmonary:      Effort: Pulmonary effort is normal.      Breath sounds: Normal breath sounds.   Musculoskeletal:         General: Normal range of motion.   Skin:     General: Skin is warm and dry.   Neurological:      General: No focal deficit present.      Mental Status: She is alert. "   Psychiatric:         Mood and Affect: Mood normal.         Behavior: Behavior normal.         Thought Content: Thought content normal.         Procedures    ED Course:         Lab Results (last 24 hours)       ** No results found for the last 24 hours. **             No radiology results from the last 24 hrs       MDM     Amount and/or Complexity of Data Reviewed  Clinical lab tests: reviewed  Tests in the radiology section of CPT®: reviewed        Initial impression of presenting illness: GI bleed    DDX: includes but is not limited to: Hemorrhoids, colitis, upper GI bleed    Patient arrives ambulatory with vitals interpreted by myself.     Pertinent features from physical exam: Minimal tenderness to palpation.    Initial diagnostic plan: Labs   Results from initial plan were reviewed and interpreted by me revealing minimal anemia    Diagnostic information from other sources: I reviewed the patient's outside studies including a prior discharge summary which demonstrated a small bowel obstruction    Interventions / Re-evaluation: Patient had 3 large bloody bowel movements in the emergency department.  Given DOAC therapy and active bowel movements with blood patient will be admitted  Medications   sodium chloride 0.9 % flush 10 mL (has no administration in time range)       Results/clinical rationale were discussed with patient and patient's spouse    Consultations/Discussion of results with other physicians: I spoke to Dr. Kirby who requests a CT angiogram    Data interpreted: Nursing notes reviewed, vital signs reviewed.  CBC with differential and CMP CT angiogram of the abdomen pelvis reviewed independently interpreted by me.  EKG independently interpreted by me.  O2 saturation:    Counseling: Discussed the results above with the patient regarding need for admitted to the floor.  Patient understands and agrees plan of care.      -----  ED Disposition       None          Final diagnoses:   None     Your Follow-Up  Providers    Follow-up information has not been specified.       Contact information for after-discharge care    Follow-up information has not been specified.          Your medication list        ASK your doctor about these medications        Instructions Last Dose Given Next Dose Due   acetaminophen 500 MG tablet  Commonly known as: TYLENOL      Take 1 tablet by mouth Daily.       amLODIPine 10 MG tablet  Commonly known as: NORVASC      Take 1 tablet by mouth Every Morning. Indications: HTN       bumetanide 2 MG tablet  Commonly known as: BUMEX           dexlansoprazole 60 MG capsule  Commonly known as: DEXILANT      Take 1 capsule by mouth Every Morning. Indications: Gastroesophageal Reflux Disease, Heartburn       EPINEPHrine 0.3 MG/0.3ML solution auto-injector injection  Commonly known as: EPIPEN      Inject 0.3 mL into the appropriate muscle as directed by prescriber 1 (One) Time.       ezetimibe 10 MG tablet  Commonly known as: ZETIA      Take 1 tablet by mouth Daily. Indications: High Amount of Fats in the Blood       febuxostat 40 MG tablet  Commonly known as: ULORIC      Take 1 tablet by mouth Every Morning. Indications: Gout       meloxicam 7.5 MG tablet  Commonly known as: MOBIC      Take 1 tablet by mouth Daily.       nebivolol 10 MG tablet  Commonly known as: BYSTOLIC      Take 1 tablet by mouth Every Morning. Indications: High Blood Pressure Disorder       oxybutynin XL 5 MG 24 hr tablet  Commonly known as: DITROPAN-XL      Take 1 tablet by mouth 2 (Two) Times a Day. Indications: Urinary Incontinence       Rivaroxaban 2.5 MG tablet  Commonly known as: XARELTO      Take 1 tablet by mouth Every Morning. Indications: h/o PE       Semaglutide (1 MG/DOSE) 2 MG/1.5ML solution pen-injector  Commonly known as: OZEMPIC      Inject 1 mg under the skin into the appropriate area as directed 1 (One) Time Per Week. Wednesdays  Indications: Type 2 Diabetes       sertraline 100 MG tablet  Commonly known as: ZOLOFT       Take 1 tablet by mouth Every Morning. Indications: Major Depressive Disorder       VITAMIN B COMPLEX PO      Take 1 tablet by mouth Daily.                 as: GISSELLE

## 2024-06-17 LAB
ANION GAP SERPL CALCULATED.3IONS-SCNC: 6 MMOL/L (ref 5–15)
BASOPHILS # BLD AUTO: 0.02 10*3/MM3 (ref 0–0.2)
BASOPHILS NFR BLD AUTO: 0.3 % (ref 0–1.5)
BUN SERPL-MCNC: 30 MG/DL (ref 8–23)
BUN/CREAT SERPL: 29.1 (ref 7–25)
CALCIUM SPEC-SCNC: 9.4 MG/DL (ref 8.6–10.5)
CHLORIDE SERPL-SCNC: 110 MMOL/L (ref 98–107)
CO2 SERPL-SCNC: 28 MMOL/L (ref 22–29)
CREAT SERPL-MCNC: 1.03 MG/DL (ref 0.57–1)
DEPRECATED RDW RBC AUTO: 47.7 FL (ref 37–54)
EGFRCR SERPLBLD CKD-EPI 2021: 57.5 ML/MIN/1.73
EOSINOPHIL # BLD AUTO: 0 10*3/MM3 (ref 0–0.4)
EOSINOPHIL NFR BLD AUTO: 0 % (ref 0.3–6.2)
ERYTHROCYTE [DISTWIDTH] IN BLOOD BY AUTOMATED COUNT: 13.4 % (ref 12.3–15.4)
GLUCOSE BLDC GLUCOMTR-MCNC: 115 MG/DL (ref 70–130)
GLUCOSE BLDC GLUCOMTR-MCNC: 91 MG/DL (ref 70–130)
GLUCOSE BLDC GLUCOMTR-MCNC: 93 MG/DL (ref 70–130)
GLUCOSE BLDC GLUCOMTR-MCNC: 93 MG/DL (ref 70–130)
GLUCOSE SERPL-MCNC: 88 MG/DL (ref 65–99)
HCT VFR BLD AUTO: 23.9 % (ref 34–46.6)
HCT VFR BLD AUTO: 24.2 % (ref 34–46.6)
HCT VFR BLD AUTO: 25.7 % (ref 34–46.6)
HCT VFR BLD AUTO: 26 % (ref 34–46.6)
HGB BLD-MCNC: 7.7 G/DL (ref 12–15.9)
HGB BLD-MCNC: 8.1 G/DL (ref 12–15.9)
HGB BLD-MCNC: 8.4 G/DL (ref 12–15.9)
HGB BLD-MCNC: 8.6 G/DL (ref 12–15.9)
IMM GRANULOCYTES # BLD AUTO: 0.06 10*3/MM3 (ref 0–0.05)
IMM GRANULOCYTES NFR BLD AUTO: 0.8 % (ref 0–0.5)
LYMPHOCYTES # BLD AUTO: 1.57 10*3/MM3 (ref 0.7–3.1)
LYMPHOCYTES NFR BLD AUTO: 20.6 % (ref 19.6–45.3)
MCH RBC QN AUTO: 31.7 PG (ref 26.6–33)
MCHC RBC AUTO-ENTMCNC: 32.7 G/DL (ref 31.5–35.7)
MCV RBC AUTO: 97 FL (ref 79–97)
MONOCYTES # BLD AUTO: 0.49 10*3/MM3 (ref 0.1–0.9)
MONOCYTES NFR BLD AUTO: 6.4 % (ref 5–12)
NEUTROPHILS NFR BLD AUTO: 5.49 10*3/MM3 (ref 1.7–7)
NEUTROPHILS NFR BLD AUTO: 71.9 % (ref 42.7–76)
NRBC BLD AUTO-RTO: 0 /100 WBC (ref 0–0.2)
PLAT MORPH BLD: NORMAL
PLATELET # BLD AUTO: 206 10*3/MM3 (ref 140–450)
PMV BLD AUTO: 10.8 FL (ref 6–12)
POTASSIUM SERPL-SCNC: 3.7 MMOL/L (ref 3.5–5.2)
RBC # BLD AUTO: 2.65 10*6/MM3 (ref 3.77–5.28)
RBC MORPH BLD: NORMAL
SODIUM SERPL-SCNC: 144 MMOL/L (ref 136–145)
WBC MORPH BLD: NORMAL
WBC NRBC COR # BLD AUTO: 7.63 10*3/MM3 (ref 3.4–10.8)

## 2024-06-17 PROCEDURE — 85018 HEMOGLOBIN: CPT | Performed by: STUDENT IN AN ORGANIZED HEALTH CARE EDUCATION/TRAINING PROGRAM

## 2024-06-17 PROCEDURE — 85014 HEMATOCRIT: CPT | Performed by: STUDENT IN AN ORGANIZED HEALTH CARE EDUCATION/TRAINING PROGRAM

## 2024-06-17 PROCEDURE — 99232 SBSQ HOSP IP/OBS MODERATE 35: CPT | Performed by: FAMILY MEDICINE

## 2024-06-17 PROCEDURE — 80048 BASIC METABOLIC PNL TOTAL CA: CPT | Performed by: STUDENT IN AN ORGANIZED HEALTH CARE EDUCATION/TRAINING PROGRAM

## 2024-06-17 PROCEDURE — 82948 REAGENT STRIP/BLOOD GLUCOSE: CPT

## 2024-06-17 PROCEDURE — 99223 1ST HOSP IP/OBS HIGH 75: CPT | Performed by: NURSE PRACTITIONER

## 2024-06-17 PROCEDURE — 85025 COMPLETE CBC W/AUTO DIFF WBC: CPT | Performed by: STUDENT IN AN ORGANIZED HEALTH CARE EDUCATION/TRAINING PROGRAM

## 2024-06-17 PROCEDURE — 25010000002 METOCLOPRAMIDE PER 10 MG: Performed by: NURSE PRACTITIONER

## 2024-06-17 PROCEDURE — 85007 BL SMEAR W/DIFF WBC COUNT: CPT | Performed by: STUDENT IN AN ORGANIZED HEALTH CARE EDUCATION/TRAINING PROGRAM

## 2024-06-17 RX ORDER — METOCLOPRAMIDE HYDROCHLORIDE 5 MG/ML
5 INJECTION INTRAMUSCULAR; INTRAVENOUS EVERY 6 HOURS
Status: DISCONTINUED | OUTPATIENT
Start: 2024-06-17 | End: 2024-06-20 | Stop reason: HOSPADM

## 2024-06-17 RX ORDER — PEG-3350, SODIUM SULFATE, SODIUM CHLORIDE, POTASSIUM CHLORIDE, SODIUM ASCORBATE AND ASCORBIC ACID 7.5-2.691G
1000 KIT ORAL
Status: COMPLETED | OUTPATIENT
Start: 2024-06-18 | End: 2024-06-18

## 2024-06-17 RX ORDER — PEG-3350, SODIUM SULFATE, SODIUM CHLORIDE, POTASSIUM CHLORIDE, SODIUM ASCORBATE AND ASCORBIC ACID 7.5-2.691G
1000 KIT ORAL
Status: COMPLETED | OUTPATIENT
Start: 2024-06-17 | End: 2024-06-17

## 2024-06-17 RX ADMIN — METOCLOPRAMIDE 5 MG: 5 INJECTION, SOLUTION INTRAMUSCULAR; INTRAVENOUS at 22:42

## 2024-06-17 RX ADMIN — OXYBUTYNIN CHLORIDE 5 MG: 5 TABLET, EXTENDED RELEASE ORAL at 22:42

## 2024-06-17 RX ADMIN — PEG-3350, SODIUM SULFATE, SODIUM CHLORIDE, POTASSIUM CHLORIDE, SODIUM ASCORBATE AND ASCORBIC ACID 1000 ML: KIT at 18:10

## 2024-06-17 RX ADMIN — PANTOPRAZOLE SODIUM 40 MG: 40 INJECTION, POWDER, FOR SOLUTION INTRAVENOUS at 22:42

## 2024-06-17 RX ADMIN — Medication 10 ML: at 22:43

## 2024-06-17 RX ADMIN — METOCLOPRAMIDE 5 MG: 5 INJECTION, SOLUTION INTRAMUSCULAR; INTRAVENOUS at 14:07

## 2024-06-17 RX ADMIN — OXYBUTYNIN CHLORIDE 5 MG: 5 TABLET, EXTENDED RELEASE ORAL at 09:27

## 2024-06-17 RX ADMIN — Medication 10 ML: at 09:27

## 2024-06-17 RX ADMIN — PANTOPRAZOLE SODIUM 40 MG: 40 INJECTION, POWDER, FOR SOLUTION INTRAVENOUS at 09:21

## 2024-06-17 NOTE — CASE MANAGEMENT/SOCIAL WORK
Discharge Planning Assessment  [facility]     Patient Name: Lissett Lowry  MRN: [id number]  Today's Date: 6/17/2024    Admit Date: 6/16/2024    Plan: Home with spouse   Discharge Needs Assessment       Row Name 06/17/24 1009       Living Environment    People in Home spouse    Current Living Arrangements home    Potentially Unsafe Housing Conditions none    Primary Care Provided by self    Provides Primary Care For no one    Family Caregiver if Needed spouse    Quality of Family Relationships helpful;involved;supportive    Able to Return to Prior Arrangements yes       Resource/Environmental Concerns    Resource/Environmental Concerns none    Transportation Concerns none       Transition Planning    Patient/Family Anticipates Transition to home with family    Patient/Family Anticipated Services at Transition ;none    Transportation Anticipated family or friend will provide       Discharge Needs Assessment    Readmission Within the Last 30 Days no previous admission in last 30 days    Equipment Currently Used at Home other (see comments)  Has a rolling walker at home. Do not use (from previous surgery)    Concerns to be Addressed denies needs/concerns at this time    Anticipated Changes Related to Illness none    Equipment Needed After Discharge none                   Discharge Plan       Row Name 06/17/24 1010       Plan    Plan Home with spouse    Patient/Family in Agreement with Plan yes    Plan Comments Spoke to patient and spouse at bedside to initiate discharge planning. Patient lives at home with spouse in [facility]. Prior to admission, she was independent with ADL's. Spouse assists with meds. She has a rolling walker from a previous surgery that she is not using. No home oxygen. She is not current with home health or outpatient therapy. Her PCP is Konstantin Gibson. She has [employer] Medicare with drug coverage. Her plan is home with spouse to transport. CM will continue to follow.    Final  Discharge Disposition Code 01 - home or self-care                  Continued Care and Services - Admitted Since 6/16/2024    No active coordination exists for this encounter.       Expected Discharge Date and Time       Expected Discharge Date Expected Discharge Time    Jun 18, 2024            Demographic Summary       Row Name 06/17/24 1009       General Information    Admission Type observation    Arrived From emergency department    Referral Source admission list    Reason for Consult discharge planning    Preferred Language English                   Functional Status       Row Name 06/17/24 1009       Functional Status    Usual Activity Tolerance moderate    Current Activity Tolerance moderate       Functional Status, IADL    Medications assistive person    Meal Preparation independent    Housekeeping independent    Laundry independent    Shopping independent                   Psychosocial    No documentation.                  Abuse/Neglect    No documentation.                  Legal    No documentation.                  Substance Abuse    No documentation.                  Patient Forms    No documentation.                     Sandy Orr RN

## 2024-06-17 NOTE — PLAN OF CARE
Goal Outcome Evaluation:  Plan of Care Reviewed With: patient        Progress: improving     Problem: Adult Inpatient Plan of Care  Goal: Plan of Care Review  Outcome: Ongoing, Progressing  Flowsheets (Taken 6/17/2024 0140)  Progress: improving  Plan of Care Reviewed With: patient  Goal: Patient-Specific Goal (Individualized)  Outcome: Ongoing, Progressing  Goal: Absence of Hospital-Acquired Illness or Injury  Outcome: Ongoing, Progressing  Intervention: Identify and Manage Fall Risk  Recent Flowsheet Documentation  Taken 6/16/2024 2000 by Margareth Zuniga RN  Safety Promotion/Fall Prevention: activity supervised  Intervention: Prevent Skin Injury  Recent Flowsheet Documentation  Taken 6/16/2024 2000 by Margareth Zuniga RN  Body Position: weight shifting  Skin Protection:   tubing/devices free from skin contact   transparent dressing maintained   skin-to-skin areas padded   skin-to-device areas padded  Intervention: Prevent and Manage VTE (Venous Thromboembolism) Risk  Recent Flowsheet Documentation  Taken 6/16/2024 2000 by Margareth Zuniga RN  Activity Management: activity encouraged  VTE Prevention/Management:   bilateral   sequential compression devices on  Goal: Optimal Comfort and Wellbeing  Outcome: Ongoing, Progressing  Intervention: Provide Person-Centered Care  Recent Flowsheet Documentation  Taken 6/16/2024 2000 by Margareth Zuniga RN  Trust Relationship/Rapport:   care explained   emotional support provided   choices provided   empathic listening provided   questions answered   questions encouraged   reassurance provided   thoughts/feelings acknowledged  Goal: Readiness for Transition of Care  Outcome: Ongoing, Progressing     Problem: Skin Injury Risk Increased  Goal: Skin Health and Integrity  Outcome: Ongoing, Progressing  Intervention: Optimize Skin Protection  Recent Flowsheet Documentation  Taken 6/16/2024 2000 by Margareth Zuniga RN  Pressure Reduction Techniques: frequent weight shift  encouraged  Pressure Reduction Devices: positioning supports utilized  Skin Protection:   tubing/devices free from skin contact   transparent dressing maintained   skin-to-skin areas padded   skin-to-device areas padded

## 2024-06-17 NOTE — CONSULTS
[facility]  Inpatient Gastroenterology Consult    Inpatient Gastroenterology Consult  Consult performed by: Cam Ascencio APRN  Consult ordered by: Tommie Moseley DO  Reason for consult: BRBPR, anemia      Referring Provider: No ref. provider found    PCP: Konstantin Gibson MD    Chief Complaint: BRBPR    History of present illness:    Lissett Lowry is a 73 y.o. female who is admitted with acute onset of BRBPR.  Patient notes she was in her usual state of health until yesterday morning when she awoke, felt the urge to have a BM, and upon having a bowel movement noticed significant blood in toilet bowl.  Also noticed blood on toilet tissue while wiping.  Notes that her lower GI bleeding persisted throughout the day prompting her presentation to ER for further evaluation.  Does not report any associated N/V/D, abdominal pain, SOB, CP, or F/C.  Does note that she has had some lower abdominal discomfort after physical exam.  Patient denies any prior history of gastrointestinal bleeding.  Reports having a colonoscopy 5 or 6 years ago with Dr. Gasca significant for diverticulosis and colon polyps.  Patient recently started taking meloxicam for arthritis pains.  Is chronically anticoagulated on Xarelto for provoked PE.  At time of admission, CT was obtained with imaging evidence of diverticulosis;  no active extravasation of contrast. Hgb 11.8 at time of admission- decreased to 8.4 overnight. Past medical, surgical, social, and family histories are reviewed for accuracy.  No documented alleviating or exacerbating factors.  Does not endorse pain at time of exam.    Allergies:  Lamisil [terbinafine], Duract [bromfenac], Duricef [cefadroxil], Erythromycin, Lasix [furosemide], Mevacor [lovastatin], Sulfa antibiotics, and Zocor [simvastatin]    Scheduled Meds:  [Held by provider] amLODIPine, 10 mg, Oral, QAM  [Held by provider] bumetanide, 2 mg, Oral, Daily  insulin regular, 2-9 Units,  Subcutaneous, Q6H  [Held by provider] nebivolol, 10 mg, Oral, QAM  oxybutynin XL, 5 mg, Oral, BID  pantoprazole, 40 mg, Intravenous, Q12H  sertraline, 100 mg, Oral, QAM  sodium chloride, 10 mL, Intravenous, Q12H    Infusions:  lactated ringers, 75 mL/hr, Last Rate: 75 mL/hr (06/17/24 0215)    PRN Meds:    dextrose    dextrose    glucagon (human recombinant)    nitroglycerin    ondansetron    sodium chloride    sodium chloride    sodium chloride    Home Meds:  Medications Prior to Admission   Medication Sig Dispense Refill Last Dose    acetaminophen (TYLENOL) 500 MG tablet Take 1 tablet by mouth Daily.   6/16/2024    amLODIPine (NORVASC) 10 MG tablet Take 1 tablet by mouth Every Morning. Indications: HTN   6/16/2024    B Complex Vitamins (VITAMIN B COMPLEX PO) Take 1 tablet by mouth Daily.   6/15/2024    bumetanide (BUMEX) 2 MG tablet    6/16/2024    dexlansoprazole (DEXILANT) 60 MG capsule Take 1 capsule by mouth Every Morning. Indications: Gastroesophageal Reflux Disease, Heartburn   6/16/2024    ezetimibe (ZETIA) 10 MG tablet Take 1 tablet by mouth Daily. Indications: High Amount of Fats in the Blood   6/16/2024    febuxostat (ULORIC) 40 MG tablet Take 1 tablet by mouth Every Morning. Indications: Gout   6/16/2024    meloxicam (MOBIC) 7.5 MG tablet Take 1 tablet by mouth Daily.   6/16/2024    nebivolol (BYSTOLIC) 10 MG tablet Take 1 tablet by mouth Every Morning. Indications: High Blood Pressure Disorder   6/16/2024    oxybutynin XL (DITROPAN-XL) 5 MG 24 hr tablet Take 1 tablet by mouth Daily. Indications: Urinary Incontinence   6/16/2024    Semaglutide, 1 MG/DOSE, (OZEMPIC) 2 MG/1.5ML solution pen-injector Inject 1 mg under the skin into the appropriate area as directed 1 (One) Time Per Week. Wednesdays  Indications: Type 2 Diabetes   Past Week    sertraline (ZOLOFT) 100 MG tablet Take 1 tablet by mouth Every Morning. Indications: Major Depressive Disorder   6/16/2024    EPINEPHrine (EPIPEN) 0.3 MG/0.3ML solution  auto-injector injection Inject 0.3 mL into the appropriate muscle as directed by prescriber 1 (One) Time.          ROS: Review of Systems   Constitutional:  Positive for activity change, appetite change and unexpected weight change. Negative for chills, diaphoresis, fatigue and fever.   HENT:  Negative for sore throat, trouble swallowing and voice change.    Eyes: Negative.    Respiratory:  Negative for apnea, cough, choking, chest tightness, shortness of breath, wheezing and stridor.    Cardiovascular:  Negative for chest pain, palpitations and leg swelling.   Gastrointestinal:  Positive for abdominal pain and blood in stool. Negative for abdominal distention, anal bleeding, constipation, diarrhea, nausea, rectal pain and vomiting.   Endocrine: Negative.    Genitourinary: Negative.    Musculoskeletal: Negative.    Skin:  Positive for pallor.   Allergic/Immunologic: Negative.    Neurological: Negative.    Hematological: Negative.    Psychiatric/Behavioral: Negative.     All other systems reviewed and are negative.      PAST MED HX:  Past Medical History:   Diagnosis Date    Anemia     Chronic kidney disease, stage 3     Chronic pain in left shoulder     Depression     Diabetes mellitus     Disease of thyroid gland     as a child- underactive     Elevated cholesterol     Gout     Heart disease     Heart valve disease     sees Dr. Leahy at [facility]    History of Clostridium difficile infection     History of kidney stones     Hyperlipidemia     Hypertension     IBS (irritable bowel syndrome)     Incisional hernia with bowel obstruction 07/2023    Kidney stone     Obesity     PHYLLIS on CPAP     CPAP compliant, pressure setting 10    Osteoarthritis of right elbow     Osteoporosis     Primary osteoarthritis of left knee     Rotator cuff tear     Sjogren's syndrome     SLE (systemic lupus erythematosus)     in remission    SOB (shortness of breath)     Staphylococcal infection 07/2023    completed 1 month of abx  "infusions    Wears glasses        PAST SURG HX:  Past Surgical History:   Procedure Laterality Date    APPENDECTOMY      CARDIAC CATHETERIZATION      no stents    CATARACT EXTRACTION      bilateral    CHOLECYSTECTOMY      COLONOSCOPY  2017    ENDOSCOPY      HYSTERECTOMY      LUMBAR FUSION  2022    OTHER SURGICAL HISTORY      Vitrectomy    TOTAL KNEE ARTHROPLASTY Left 08/26/2019    Procedure: TOTAL KNEE ARTHROPLASTY LEFT;  Surgeon: Rony Rojas MD;  Location:  [facility];  Service: Orthopedics    VENTRAL/INCISIONAL HERNIA REPAIR N/A 8/29/2023    Procedure: REPAIR OF INCISIONAL AND UMBILICAL HERNIA REPAIR WITH MESH;  Surgeon: Royal Tineo MD;  Location:  [facility];  Service: General;  Laterality: N/A;       FAM HX:  Family History   Problem Relation Age of Onset    Stroke Mother     Heart disease Mother     Hypertension Mother     Heart attack Mother     Deep vein thrombosis Mother     Osteoarthritis Mother     Stroke Father     Heart disease Father     Hypertension Father     Heart attack Father     Cancer Other     Diabetes Other     Heart disease Other     Hypertension Other     Heart disease Other     Hypertension Other     Heart attack Other        SOC HX:  Social History     Socioeconomic History    Marital status:    Tobacco Use    Smoking status: Never    Smokeless tobacco: Never   Vaping Use    Vaping status: Never Used   Substance and Sexual Activity    Alcohol use: No    Drug use: No    Sexual activity: Defer       PHYSICAL EXAM  /60   Pulse 70   Temp 97.9 °F (36.6 °C) (Axillary)   Resp 19   Ht 167.6 cm (66\")   Wt 74.8 kg (165 lb)   SpO2 96%   BMI 26.63 kg/m²   Wt Readings from Last 3 Encounters:   06/16/24 74.8 kg (165 lb)   10/24/23 84.8 kg (187 lb)   08/25/23 83.6 kg (184 lb 4.9 oz)   ,body mass index is 26.63 kg/m².  Physical Exam  Vitals and nursing note reviewed.   Constitutional:       General: She is not in acute distress.     Appearance: Normal appearance. She is normal " weight. She is not ill-appearing or toxic-appearing.   HENT:      Head: Normocephalic and atraumatic.   Eyes:      General: No scleral icterus.     Extraocular Movements: Extraocular movements intact.      Conjunctiva/sclera: Conjunctivae normal.      Pupils: Pupils are equal, round, and reactive to light.   Cardiovascular:      Rate and Rhythm: Normal rate and regular rhythm.      Pulses: Normal pulses.      Heart sounds: Normal heart sounds.   Pulmonary:      Effort: Pulmonary effort is normal. No respiratory distress.      Breath sounds: Normal breath sounds.   Abdominal:      General: Abdomen is flat. Bowel sounds are normal. There is no distension.      Palpations: Abdomen is soft. There is no mass.      Tenderness: There is abdominal tenderness. There is no guarding or rebound.      Hernia: No hernia is present.   Genitourinary:     Rectum: Guaiac result positive.   Skin:     General: Skin is warm and dry.      Capillary Refill: Capillary refill takes less than 2 seconds.      Coloration: Skin is pale. Skin is not jaundiced.   Neurological:      General: No focal deficit present.      Mental Status: She is alert and oriented to person, place, and time.   Psychiatric:         Mood and Affect: Mood normal.         Behavior: Behavior normal.         Thought Content: Thought content normal.         Judgment: Judgment normal.     Results Review:  I reviewed the patient's new clinical results.  I reviewed the patient's new imaging results and agree with the interpretation.  I reviewed the patient's other test results and agree with the interpretation  I personally viewed and interpreted the patient's EKG/Telemetry data    Lab Results   Component Value Date    WBC 7.63 06/17/2024    HGB 8.4 (L) 06/17/2024    HGB 8.6 (L) 06/17/2024    HGB 10.4 (L) 06/16/2024    HCT 25.7 (L) 06/17/2024    MCV 97.0 06/17/2024     06/17/2024       Lab Results   Component Value Date    INR 1.18 (H) 06/16/2024    INR 1.06 08/25/2023        Lab Results   Component Value Date    GLUCOSE 88 06/17/2024    BUN 30 (H) 06/17/2024    CREATININE 1.03 (H) 06/17/2024    EGFRIFNONA 48 (L) 08/27/2019    BCR 29.1 (H) 06/17/2024     06/17/2024    K 3.7 06/17/2024    CO2 28.0 06/17/2024    CALCIUM 9.4 06/17/2024    ALBUMIN 3.8 06/16/2024    ALKPHOS 79 06/16/2024    BILITOT 0.4 06/16/2024    ALT 16 06/16/2024    AST 21 06/16/2024       CT Angiogram Abdomen Pelvis  Result Date: 6/16/2024  CT ANGIOGRAM ABDOMEN PELVIS Date of Exam: 6/16/2024 12:59 PM EDT Indication: rectal bleeding. Comparison: 7/20/2023 Technique: CTA of the abdomen and pelvis was performed after the uneventful intravenous administration of 100 cc Isovue-370 IV contrast . Reconstructed coronal and sagittal images were also obtained. In addition, a 3-D volume rendered image was created for interpretation. Automated exposure control and iterative reconstruction methods were used. Findings: The descending thoracic aorta is normal. The origins of the celiac axis and superior mesenteric arteries are patent. Severe narrowing of the left renal artery origin. Mild atheromatous disease of the origin of the right renal artery. Inferior mesenteric artery is patent. No aneurysm. The common iliac arteries are normal. Calcified granulomata at the lung bases. Calcified granulomata within the liver and spleen. The pancreas is normal. Previous cholecystectomy. 2.5 cm left adrenal benign adenoma. There is a filling 0.8 cm exophytic hyperdense left renal lesion consistent with a proteinaceous or hemorrhagic cyst. 1.6 cm exophytic left renal cyst. 2 cm right renal cyst. No hydroureteronephrosis. The urinary bladder is normal. The small bowel is nonobstructed. Colonic diverticulosis without evidence of diverticulitis. Appendectomy. No ascites or pneumoperitoneum. No adenopathy. Degenerative changes of the hips and lumbar spine. Prior lumbar spine fusion     Colonic diverticulosis without evidence of  diverticulitis. No etiology for the patient's rectal bleeding is definitively identified. Electronically Signed: Max Sanchez MD  6/16/2024 1:27 PM EDT  Workstation ID: [id number]     ASSESSMENTS/PLANS  1. Acute lower gastrointestinal bleeding, hematochezia, suspect diverticular bleed  2. Acute Blood Loss Anemia  3. T2DM, on semaglutide  4. Hx of provoked PE  5.  NSAID use, meloxicam  6.  Chronic anticoagulation, Xarelto  7.  Suspect gastroparesis    Lissett Lowry is a 73 y.o. female who presents to hospital with acute onset of lower gastrointestinal bleeding. CT imaging reviewed with evidence of diverticulosis without active contrast extravasation. Suspect diverticular bleed though given high risk NSAID use, cannot rule out upper GI bleeding source. Recommend colonoscopy and esophagogastroduodenoscopy in AM for further evaluation of GI tract for sources of bleeding.     >>> NPO at midnight; clear liquid diet today.   >>> obtain informed consent for colonoscopy and esophagogastroduodenoscopy  >>> MoviPrep: Give 8 ounces every 15 minutes ×2 L starting at 4 PM.  Repeat dose at 5 AM tomorrow.  Must finish both doses in 2 hours.  Stool should look like lemonade when finished.  Please call ENDO at [phone] at 7:30am if not clear.  >>> Hold anticoagulation in setting of acute bleed  >>> Abstain from any further use of meloxicam given concerns of anemia  >>> IV PPI BID  >>> Trend H&H and transfuse for hemoglobins less than 7 or symptomatic anemia per protocol.  >>> Will schedule Reglan given concerns of gastroparesis overnight to aid in facilitation of bowel prep.  >>> Order placed to obtain medical records from patient's most recent outpatient colonoscopy    I discussed the patient's findings and my recommendations with patient, family, nursing staff, and consulting provider    DEBORAH Childers  06/17/24  08:23 EDT

## 2024-06-17 NOTE — PROGRESS NOTES
[facility] Medicine Services  PROGRESS NOTE    Patient Name: Lissett Lowry  : 1950  MRN: [id number]    Date of Admission: 2024  Primary Care Physician: Konstantin Gibson MD    Subjective   Subjective     CC:  Bright red blood per rectum.    HPI:  Patient denies any further bleeding from rectum.  Has some generalized lower abdominal discomfort.  Denies nausea vomiting fevers chills      Objective   Objective     Vital Signs:   Temp:  [97 °F (36.1 °C)-98 °F (36.7 °C)] 97.9 °F (36.6 °C)  Heart Rate:  [57-78] 70  Resp:  [17-19] 19  BP: ()/(55-66) 117/60     Physical Exam:  Constitutional:  female no acute distress, awake, alert  HENT: NCAT, mucous membranes moist  Respiratory: Clear to auscultation bilaterally, respiratory effort normal   Cardiovascular: RRR, no murmurs, rubs, or gallops  Gastrointestinal: Positive bowel sounds, soft, tender in right lower quadrant and left lower quadrant no guarding or rebound  Musculoskeletal: No bilateral ankle edema  Psychiatric: Appropriate affect, cooperative  Neurologic: Oriented x 3, speech clear  Skin: No rashes    Results Reviewed:  LAB RESULTS:      Lab 24  1232 24  0631 24  0005 24  1619 24  1331 24  1146   WBC  --  7.63  --   --  8.54 7.06   HEMOGLOBIN 8.1* 8.4* 8.6* 10.4* 11.2* 11.8*   HEMATOCRIT 24.2* 25.7* 26.0* 31.6* 33.2* 34.6   PLATELETS  --  206  --   --  214 226   NEUTROS ABS  --  5.49  --   --  6.75 5.32   IMMATURE GRANS (ABS)  --  0.06*  --   --  0.01 0.02   LYMPHS ABS  --  1.57  --   --  1.19 1.21   MONOS ABS  --  0.49  --   --  0.55 0.48   EOS ABS  --  0.00  --   --  0.01 0.01   MCV  --  97.0  --   --  96.2 93.5   PROTIME  --   --   --   --   --  15.6*         Lab 24  0631 24  1146   SODIUM 144 144   POTASSIUM 3.7 3.6   CHLORIDE 110* 108*   CO2 28.0 25.3   ANION GAP 6.0 10.7   BUN 30* 23   CREATININE 1.03* 0.87   EGFR 57.5* 70.5   GLUCOSE 88 93   CALCIUM  "9.4 9.8         Lab 06/16/24  1146   TOTAL PROTEIN 6.0   ALBUMIN 3.8   GLOBULIN 2.2   ALT (SGPT) 16   AST (SGOT) 21   BILIRUBIN 0.4   ALK PHOS 79         Lab 06/16/24  1146   PROTIME 15.6*   INR 1.18*             Lab 06/16/24 1958   ABO TYPING A   RH TYPING Positive   ANTIBODY SCREEN Negative         Brief Urine Lab Results  (Last result in the past 365 days)        Color   Clarity   Blood   Leuk Est   Nitrite   Protein   CREAT   Urine HCG        06/16/24 2117 Yellow   Clear   Trace   Negative   Negative   Negative                   Cultures:  No results found for: \"BLOODCX\", \"URINECX\", \"WOUNDCX\", \"MRSACX\", \"RESPCX\", \"STOOLCX\"    Microbiology Results Abnormal       None            CT Angiogram Abdomen Pelvis    Result Date: 6/16/2024  CT ANGIOGRAM ABDOMEN PELVIS Date of Exam: 6/16/2024 12:59 PM EDT Indication: rectal bleeding. Comparison: 7/20/2023 Technique: CTA of the abdomen and pelvis was performed after the uneventful intravenous administration of 100 cc Isovue-370 IV contrast . Reconstructed coronal and sagittal images were also obtained. In addition, a 3-D volume rendered image was created for interpretation. Automated exposure control and iterative reconstruction methods were used. Findings: The descending thoracic aorta is normal. The origins of the celiac axis and superior mesenteric arteries are patent. Severe narrowing of the left renal artery origin. Mild atheromatous disease of the origin of the right renal artery. Inferior mesenteric artery is patent. No aneurysm. The common iliac arteries are normal. Calcified granulomata at the lung bases. Calcified granulomata within the liver and spleen. The pancreas is normal. Previous cholecystectomy. 2.5 cm left adrenal benign adenoma. There is a filling 0.8 cm exophytic hyperdense left renal lesion consistent with a proteinaceous or hemorrhagic cyst. 1.6 cm exophytic left renal cyst. 2 cm right renal cyst. No hydroureteronephrosis. The urinary bladder is " normal. The small bowel is nonobstructed. Colonic diverticulosis without evidence of diverticulitis. Appendectomy. No ascites or pneumoperitoneum. No adenopathy. Degenerative changes of the hips and lumbar spine. Prior lumbar spine fusion     Impression: Colonic diverticulosis without evidence of diverticulitis. No etiology for the patient's rectal bleeding is definitively identified. Electronically Signed: Max Sanchez MD  6/16/2024 1:27 PM EDT  Workstation ID: [id number]     Results for orders placed during the hospital encounter of 07/20/23    Adult Transesophageal Echo 3D (JENNIFER) W/ Cont If Necessary Per Protocol    Interpretation Summary    The aortic valve is abnormal in structure. There is calcification of the aortic valve. The aortic valve appears trileaflet. Trace aortic valve regurgitation is present. No aortic valve stenosis is present.    There is moderate calcification of the mitral valve posterior leaflet. No evidence of a mitral valve mass is present. Mild mitral valve regurgitation is present. No significant mitral valve stenosis is present.    The tricuspid valve is structurally normal with no significant stenosis present. Mild tricuspid valve regurgitation is present. No TV vegetation.    The pulmonic valve is structurally normal with no significant stenosis present. There is trace pulmonic valve regurgitation present.    There is no evidence of pericardial effusion. There is a well circumscribed 2.7 x 1.6cm mass in the L AV groove consistent with epicardial fat      Current medications:  Scheduled Meds:[Held by provider] amLODIPine, 10 mg, Oral, QAM  [Held by provider] bumetanide, 2 mg, Oral, Daily  insulin regular, 2-9 Units, Subcutaneous, Q6H  metoclopramide, 5 mg, Intravenous, Q6H  [Held by provider] nebivolol, 10 mg, Oral, QAM  oxybutynin XL, 5 mg, Oral, BID  pantoprazole, 40 mg, Intravenous, Q12H  PEG-KCl-NaCl-NaSulf-Na Asc-C, 1,000 mL, Oral, Once When Specified   Followed by  [START ON  6/18/2024] PEG-KCl-NaCl-NaSulf-Na Asc-C, 1,000 mL, Oral, Once When Specified  sertraline, 100 mg, Oral, QAM  sodium chloride, 10 mL, Intravenous, Q12H      Continuous Infusions:     PRN Meds:.  dextrose    dextrose    glucagon (human recombinant)    nitroglycerin    ondansetron    sodium chloride    sodium chloride    sodium chloride    Assessment & Plan   Assessment & Plan     Active Hospital Problems    Diagnosis  POA    **GI bleed [K92.2]  Yes    Sjogren syndrome, unspecified [M35.00]  Yes    PHYLLIS on CPAP [G47.33]  Yes    DDD (degenerative disc disease), lumbar [M51.36]  Yes    Controlled type 2 diabetes mellitus without complication, with long-term current use of insulin [E11.9, Z79.4]  Not Applicable      Resolved Hospital Problems   No resolved problems to display.        Brief Hospital Course to date:  Acute blood loss anemia  Acute lower GI bleed  Hematochezia   -Consult GI  -Hemoglobin initially 11 on admission now it is roughly 8 I suspect component of hemoconcentration  -Trend Hbg q6hr, type and screen, transfuse for Hbg <7   -CL diet then NPO after midnight   -PPI   -IVF resuscitation      Type II DM  -Accuchecks ACHS w SSI   -On ozempic      Hx PE  -Provoked due to immobility after back surgery approximately 3 years ago  -Will discontinue      Dysuria  Hx frequent UTIs  -UA reviewed, not concerning for infection      Mild neurocognitive disorder  -Diagnosis made from Neurology 4/2024 based on chart review   - assists   -PT/OT when appropriate      HTN   -On bumex, norvasc, bystolic outpatient, will hold   -ECHO 07/2023: EF:61-65%     Gout  -continue febuxostat once PO tolerated      PHYLLIS  -CPAP at night    Expected Discharge Location and Transportation: Home with home health  Expected Discharge 06/19/2024  Expected Discharge Date: 6/18/2024; Expected Discharge Time:      VTE Prophylaxis:  Mechanical VTE prophylaxis orders are present.         AM-PAC 6 Clicks Score (PT): 19 (06/17/24 0800)    CODE  STATUS:   Code Status and Medical Interventions:   Ordered at: 06/16/24 3704     Level Of Support Discussed With:    Patient     Code Status (Patient has no pulse and is not breathing):    No CPR (Do Not Attempt to Resuscitate)     Medical Interventions (Patient has pulse or is breathing):    Full Support       Nimisha Iyer MD  06/17/24

## 2024-06-18 ENCOUNTER — ANESTHESIA EVENT (OUTPATIENT)
Dept: GASTROENTEROLOGY | Facility: HOSPITAL | Age: 74
End: 2024-06-18
Payer: MEDICARE

## 2024-06-18 ENCOUNTER — ANESTHESIA (OUTPATIENT)
Dept: GASTROENTEROLOGY | Facility: HOSPITAL | Age: 74
End: 2024-06-18
Payer: MEDICARE

## 2024-06-18 PROBLEM — D62 ABLA (ACUTE BLOOD LOSS ANEMIA): Status: ACTIVE | Noted: 2024-06-16

## 2024-06-18 LAB
ANION GAP SERPL CALCULATED.3IONS-SCNC: 10 MMOL/L (ref 5–15)
BUN SERPL-MCNC: 23 MG/DL (ref 8–23)
BUN/CREAT SERPL: 24.7 (ref 7–25)
CALCIUM SPEC-SCNC: 8.9 MG/DL (ref 8.6–10.5)
CHLORIDE SERPL-SCNC: 113 MMOL/L (ref 98–107)
CO2 SERPL-SCNC: 23 MMOL/L (ref 22–29)
CREAT SERPL-MCNC: 0.93 MG/DL (ref 0.57–1)
EGFRCR SERPLBLD CKD-EPI 2021: 65 ML/MIN/1.73
GLUCOSE BLDC GLUCOMTR-MCNC: 117 MG/DL (ref 70–130)
GLUCOSE BLDC GLUCOMTR-MCNC: 123 MG/DL (ref 70–130)
GLUCOSE BLDC GLUCOMTR-MCNC: 125 MG/DL (ref 70–130)
GLUCOSE BLDC GLUCOMTR-MCNC: 143 MG/DL (ref 70–130)
GLUCOSE BLDC GLUCOMTR-MCNC: 178 MG/DL (ref 70–130)
GLUCOSE SERPL-MCNC: 131 MG/DL (ref 65–99)
HCT VFR BLD AUTO: 20.9 % (ref 34–46.6)
HCT VFR BLD AUTO: 21.2 % (ref 34–46.6)
HCT VFR BLD AUTO: 21.8 % (ref 34–46.6)
HCT VFR BLD AUTO: 24 % (ref 34–46.6)
HGB BLD-MCNC: 6.8 G/DL (ref 12–15.9)
HGB BLD-MCNC: 7 G/DL (ref 12–15.9)
HGB BLD-MCNC: 7.3 G/DL (ref 12–15.9)
HGB BLD-MCNC: 7.9 G/DL (ref 12–15.9)
POTASSIUM SERPL-SCNC: 3.3 MMOL/L (ref 3.5–5.2)
QT INTERVAL: 446 MS
QTC INTERVAL: 463 MS
SODIUM SERPL-SCNC: 146 MMOL/L (ref 136–145)

## 2024-06-18 PROCEDURE — 45378 DIAGNOSTIC COLONOSCOPY: CPT | Performed by: INTERNAL MEDICINE

## 2024-06-18 PROCEDURE — 85018 HEMOGLOBIN: CPT | Performed by: FAMILY MEDICINE

## 2024-06-18 PROCEDURE — 93005 ELECTROCARDIOGRAM TRACING: CPT | Performed by: ANESTHESIOLOGY

## 2024-06-18 PROCEDURE — 85014 HEMATOCRIT: CPT | Performed by: STUDENT IN AN ORGANIZED HEALTH CARE EDUCATION/TRAINING PROGRAM

## 2024-06-18 PROCEDURE — 0DJD8ZZ INSPECTION OF LOWER INTESTINAL TRACT, VIA NATURAL OR ARTIFICIAL OPENING ENDOSCOPIC: ICD-10-PCS | Performed by: INTERNAL MEDICINE

## 2024-06-18 PROCEDURE — 88305 TISSUE EXAM BY PATHOLOGIST: CPT | Performed by: INTERNAL MEDICINE

## 2024-06-18 PROCEDURE — 86900 BLOOD TYPING SEROLOGIC ABO: CPT

## 2024-06-18 PROCEDURE — 25010000002 PROPOFOL 10 MG/ML EMULSION: Performed by: NURSE ANESTHETIST, CERTIFIED REGISTERED

## 2024-06-18 PROCEDURE — 0DB68ZX EXCISION OF STOMACH, VIA NATURAL OR ARTIFICIAL OPENING ENDOSCOPIC, DIAGNOSTIC: ICD-10-PCS | Performed by: INTERNAL MEDICINE

## 2024-06-18 PROCEDURE — 85014 HEMATOCRIT: CPT | Performed by: INTERNAL MEDICINE

## 2024-06-18 PROCEDURE — 36430 TRANSFUSION BLD/BLD COMPNT: CPT

## 2024-06-18 PROCEDURE — 25810000003 SODIUM CHLORIDE 0.9 % SOLUTION: Performed by: NURSE ANESTHETIST, CERTIFIED REGISTERED

## 2024-06-18 PROCEDURE — P9016 RBC LEUKOCYTES REDUCED: HCPCS

## 2024-06-18 PROCEDURE — 85018 HEMOGLOBIN: CPT | Performed by: STUDENT IN AN ORGANIZED HEALTH CARE EDUCATION/TRAINING PROGRAM

## 2024-06-18 PROCEDURE — 63710000001 INSULIN REGULAR HUMAN PER 5 UNITS: Performed by: INTERNAL MEDICINE

## 2024-06-18 PROCEDURE — 25010000002 METOCLOPRAMIDE PER 10 MG: Performed by: NURSE PRACTITIONER

## 2024-06-18 PROCEDURE — 25010000002 METOCLOPRAMIDE PER 10 MG: Performed by: INTERNAL MEDICINE

## 2024-06-18 PROCEDURE — 80048 BASIC METABOLIC PNL TOTAL CA: CPT | Performed by: INTERNAL MEDICINE

## 2024-06-18 PROCEDURE — 85014 HEMATOCRIT: CPT | Performed by: FAMILY MEDICINE

## 2024-06-18 PROCEDURE — 43239 EGD BIOPSY SINGLE/MULTIPLE: CPT | Performed by: INTERNAL MEDICINE

## 2024-06-18 PROCEDURE — 82948 REAGENT STRIP/BLOOD GLUCOSE: CPT

## 2024-06-18 PROCEDURE — 99233 SBSQ HOSP IP/OBS HIGH 50: CPT | Performed by: FAMILY MEDICINE

## 2024-06-18 PROCEDURE — 85018 HEMOGLOBIN: CPT | Performed by: INTERNAL MEDICINE

## 2024-06-18 RX ORDER — DROPERIDOL 2.5 MG/ML
0.62 INJECTION, SOLUTION INTRAMUSCULAR; INTRAVENOUS ONCE AS NEEDED
Status: DISCONTINUED | OUTPATIENT
Start: 2024-06-18 | End: 2024-06-18

## 2024-06-18 RX ORDER — POTASSIUM CHLORIDE 20 MEQ/1
40 TABLET, EXTENDED RELEASE ORAL EVERY 4 HOURS
Status: COMPLETED | OUTPATIENT
Start: 2024-06-18 | End: 2024-06-18

## 2024-06-18 RX ORDER — SODIUM CHLORIDE 9 MG/ML
INJECTION, SOLUTION INTRAVENOUS CONTINUOUS PRN
Status: DISCONTINUED | OUTPATIENT
Start: 2024-06-18 | End: 2024-06-18 | Stop reason: SURG

## 2024-06-18 RX ORDER — SODIUM CHLORIDE, SODIUM LACTATE, POTASSIUM CHLORIDE, CALCIUM CHLORIDE 600; 310; 30; 20 MG/100ML; MG/100ML; MG/100ML; MG/100ML
30 INJECTION, SOLUTION INTRAVENOUS CONTINUOUS PRN
Status: DISCONTINUED | OUTPATIENT
Start: 2024-06-18 | End: 2024-06-20 | Stop reason: HOSPADM

## 2024-06-18 RX ORDER — ROCURONIUM BROMIDE 10 MG/ML
INJECTION, SOLUTION INTRAVENOUS AS NEEDED
Status: DISCONTINUED | OUTPATIENT
Start: 2024-06-18 | End: 2024-06-18 | Stop reason: SURG

## 2024-06-18 RX ORDER — SUCCINYLCHOLINE/SOD CL,ISO/PF 200MG/10ML
SYRINGE (ML) INTRAVENOUS AS NEEDED
Status: DISCONTINUED | OUTPATIENT
Start: 2024-06-18 | End: 2024-06-18 | Stop reason: SURG

## 2024-06-18 RX ORDER — HYDROMORPHONE HYDROCHLORIDE 1 MG/ML
0.5 INJECTION, SOLUTION INTRAMUSCULAR; INTRAVENOUS; SUBCUTANEOUS
Status: DISCONTINUED | OUTPATIENT
Start: 2024-06-18 | End: 2024-06-18

## 2024-06-18 RX ORDER — FENTANYL CITRATE 50 UG/ML
50 INJECTION, SOLUTION INTRAMUSCULAR; INTRAVENOUS
Status: DISCONTINUED | OUTPATIENT
Start: 2024-06-18 | End: 2024-06-18

## 2024-06-18 RX ORDER — LIDOCAINE HYDROCHLORIDE 10 MG/ML
INJECTION, SOLUTION EPIDURAL; INFILTRATION; INTRACAUDAL; PERINEURAL AS NEEDED
Status: DISCONTINUED | OUTPATIENT
Start: 2024-06-18 | End: 2024-06-18 | Stop reason: SURG

## 2024-06-18 RX ORDER — OXYBUTYNIN CHLORIDE 5 MG/1
5 TABLET, EXTENDED RELEASE ORAL DAILY
Status: DISCONTINUED | OUTPATIENT
Start: 2024-06-19 | End: 2024-06-20 | Stop reason: HOSPADM

## 2024-06-18 RX ORDER — EPHEDRINE SULFATE 50 MG/ML
INJECTION INTRAVENOUS AS NEEDED
Status: DISCONTINUED | OUTPATIENT
Start: 2024-06-18 | End: 2024-06-18 | Stop reason: SURG

## 2024-06-18 RX ORDER — PROPOFOL 10 MG/ML
VIAL (ML) INTRAVENOUS AS NEEDED
Status: DISCONTINUED | OUTPATIENT
Start: 2024-06-18 | End: 2024-06-18 | Stop reason: SURG

## 2024-06-18 RX ADMIN — SERTRALINE 100 MG: 100 TABLET, FILM COATED ORAL at 08:32

## 2024-06-18 RX ADMIN — METOCLOPRAMIDE 5 MG: 5 INJECTION, SOLUTION INTRAMUSCULAR; INTRAVENOUS at 20:19

## 2024-06-18 RX ADMIN — OXYBUTYNIN CHLORIDE 5 MG: 5 TABLET, EXTENDED RELEASE ORAL at 08:31

## 2024-06-18 RX ADMIN — METOCLOPRAMIDE 5 MG: 5 INJECTION, SOLUTION INTRAMUSCULAR; INTRAVENOUS at 08:31

## 2024-06-18 RX ADMIN — PANTOPRAZOLE SODIUM 40 MG: 40 INJECTION, POWDER, FOR SOLUTION INTRAVENOUS at 20:19

## 2024-06-18 RX ADMIN — PROPOFOL 150 MG: 10 INJECTION, EMULSION INTRAVENOUS at 09:58

## 2024-06-18 RX ADMIN — PANTOPRAZOLE SODIUM 40 MG: 40 INJECTION, POWDER, FOR SOLUTION INTRAVENOUS at 08:31

## 2024-06-18 RX ADMIN — PEG-3350, SODIUM SULFATE, SODIUM CHLORIDE, POTASSIUM CHLORIDE, SODIUM ASCORBATE AND ASCORBIC ACID 1000 ML: KIT at 03:59

## 2024-06-18 RX ADMIN — Medication 10 ML: at 20:20

## 2024-06-18 RX ADMIN — SODIUM CHLORIDE: 9 INJECTION, SOLUTION INTRAVENOUS at 09:53

## 2024-06-18 RX ADMIN — METOCLOPRAMIDE 5 MG: 5 INJECTION, SOLUTION INTRAMUSCULAR; INTRAVENOUS at 13:20

## 2024-06-18 RX ADMIN — POTASSIUM CHLORIDE 40 MEQ: 1500 TABLET, EXTENDED RELEASE ORAL at 13:21

## 2024-06-18 RX ADMIN — Medication 10 ML: at 08:33

## 2024-06-18 RX ADMIN — INSULIN HUMAN 2 UNITS: 100 INJECTION, SOLUTION PARENTERAL at 17:12

## 2024-06-18 RX ADMIN — LIDOCAINE HYDROCHLORIDE 50 MG: 10 INJECTION, SOLUTION EPIDURAL; INFILTRATION; INTRACAUDAL; PERINEURAL at 09:58

## 2024-06-18 RX ADMIN — POTASSIUM CHLORIDE 40 MEQ: 1500 TABLET, EXTENDED RELEASE ORAL at 17:12

## 2024-06-18 RX ADMIN — Medication 140 MG: at 09:58

## 2024-06-18 RX ADMIN — EPHEDRINE SULFATE 15 MG: 50 INJECTION, SOLUTION INTRAVENOUS at 10:35

## 2024-06-18 RX ADMIN — ROCURONIUM BROMIDE 10 MG: 10 INJECTION INTRAVENOUS at 09:58

## 2024-06-18 NOTE — ANESTHESIA PROCEDURE NOTES
Airway  Urgency: elective    Date/Time: 6/18/2024 10:08 AM  Airway not difficult    General Information and Staff    Patient location during procedure: OR  CRNA/CAA: Junior OSMEL Hamilton, CRNA    Indications and Patient Condition  Indications for airway management: airway protection    Preoxygenated: yes  MILS not maintained throughout  Mask difficulty assessment: 1 - vent by mask    Final Airway Details  Final airway type: endotracheal airway      Successful airway: ETT  Cuffed: yes   Successful intubation technique: direct laryngoscopy and RSI  Facilitating devices/methods: cricoid pressure  Endotracheal tube insertion site: oral  Blade: Stefania  Blade size: 3  ETT size (mm): 7.0  Cormack-Lehane Classification: grade I - full view of glottis  Placement verified by: chest auscultation and capnometry   Measured from: lips  ETT/EBT  to lips (cm): 20  Number of attempts at approach: 1  Assessment: lips, teeth, and gum same as pre-op and atraumatic intubation    Additional Comments  Negative epigastric sounds, Breath sound equal bilaterally with symmetric chest rise and fall

## 2024-06-18 NOTE — BRIEF OP NOTE
COLONOSCOPY, ESOPHAGOGASTRODUODENOSCOPY  Progress Note    Lissett Lowry  6/18/2024    Colonoscopy reveals old blood in the colon, mainly on the left side.  Clot and old blood were removed by suction and water jet.  50 minutes spent inspecting the colon.  No fresh blood or source of bleeding identified.  Pandiverticulosis is noted, the presumed source of recent GI bleeding.  Terminal ileum is normal.  No blood seen in the terminal ileum.    EGD shows normal esophagus.  Mild gastritis is noted.  Biopsy taken for H. pylori.  Duodenum normal.    >> Anticipate home tomorrow if no further bleeding.    Mark I. Brunner, MD     Date: 6/18/2024  Time: 11:08 EDT

## 2024-06-18 NOTE — ANESTHESIA PREPROCEDURE EVALUATION
Anesthesia Evaluation     Patient summary reviewed and Nursing notes reviewed   NPO Solid Status: > 8 hours  NPO Liquid Status: > 2 hours           Airway   Mallampati: III  TM distance: >3 FB  Neck ROM: full  Possible difficult intubation  Dental      Pulmonary    (+) pulmonary embolism (7/23),shortness of breath, sleep apnea on CPAP  (-) asthma, not a smoker, no home oxygen    ROS comment: Sats 100% RA  Cardiovascular     ECG reviewed  PT is on anticoagulation therapy    (+) hypertension, hyperlipidemia  (-) valvular problems/murmurs (mild MR), past MI, dysrhythmias, angina, cardiac stents    ROS comment: ECG SR bi fascicular block   ECHO EF >60% calcified valves MR mild     Neuro/Psych  (-) seizures, CVA  GI/Hepatic/Renal/Endo    (+) GI bleeding , renal disease- CRI, diabetes mellitus type 2, thyroid problem hypothyroidism  (-)  obesity, morbid obesity    Musculoskeletal     Abdominal    Substance History      OB/GYN          Other   arthritis, blood dyscrasia anemia,     ROS/Med Hx Other: Xarelto for PE last year   Semaglutide 1 week ago  ABLA hct 21----> 24 creat >1                     Anesthesia Plan    ASA 3     general   Rapid sequence  (RSI CP May need John )  intravenous induction     Anesthetic plan, risks, benefits, and alternatives have been provided, discussed and informed consent has been obtained with: patient.    Plan discussed with CRNA.        CODE STATUS:    Level Of Support Discussed With: Patient  Code Status (Patient has no pulse and is not breathing): No CPR (Do Not Attempt to Resuscitate)  Medical Interventions (Patient has pulse or is breathing): Full Support

## 2024-06-18 NOTE — PROGRESS NOTES
[facility] Medicine Services  PROGRESS NOTE    Patient Name: Lissett Lowry  : 1950  MRN: [id number]    Date of Admission: 2024  Primary Care Physician: Konstantin Gibson MD    Subjective   Subjective     CC:  Bright red blood per rectum.    HPI:  Patient did start to bleed from her rectum due to bowel prep.  I discussed the case with Dr. Brunner who believes that this is diverticular bleed based on colonoscopy findings.  Potassium replaced      Objective   Objective     Vital Signs:   Temp:  [98.2 °F (36.8 °C)-98.3 °F (36.8 °C)] 98.2 °F (36.8 °C)  Heart Rate:  [62-67] 63  Resp:  [18] 18  BP: (107-122)/(47-60) 109/53     Physical Exam:  Constitutional:  female no acute distress, awake, alert  HENT: NCAT, mucous membranes moist  Respiratory: Clear to auscultation bilaterally, respiratory effort normal   Cardiovascular: RRR, no murmurs, rubs, or gallops  Gastrointestinal: Positive bowel sounds, soft, tender in right lower quadrant and left lower quadrant no guarding or rebound  Musculoskeletal: No bilateral ankle edema  Psychiatric: Appropriate affect, cooperative  Neurologic: Oriented x 3, speech clear  Skin: No rashes    Results Reviewed:  LAB RESULTS:      Lab 24  0057 24  1801 24  1232 24  0631 24  0005 24  1619 24  1331 24  1146   WBC  --   --   --  7.63  --   --  8.54 7.06   HEMOGLOBIN 7.0* 7.7* 8.1* 8.4* 8.6*   < > 11.2* 11.8*   HEMATOCRIT 21.2* 23.9* 24.2* 25.7* 26.0*   < > 33.2* 34.6   PLATELETS  --   --   --  206  --   --  214 226   NEUTROS ABS  --   --   --  5.49  --   --  6.75 5.32   IMMATURE GRANS (ABS)  --   --   --  0.06*  --   --  0.01 0.02   LYMPHS ABS  --   --   --  1.57  --   --  1.19 1.21   MONOS ABS  --   --   --  0.49  --   --  0.55 0.48   EOS ABS  --   --   --  0.00  --   --  0.01 0.01   MCV  --   --   --  97.0  --   --  96.2 93.5   PROTIME  --   --   --   --   --   --   --  15.6*    < > = values  "in this interval not displayed.         Lab 06/17/24  0631 06/16/24  1146   SODIUM 144 144   POTASSIUM 3.7 3.6   CHLORIDE 110* 108*   CO2 28.0 25.3   ANION GAP 6.0 10.7   BUN 30* 23   CREATININE 1.03* 0.87   EGFR 57.5* 70.5   GLUCOSE 88 93   CALCIUM 9.4 9.8         Lab 06/16/24  1146   TOTAL PROTEIN 6.0   ALBUMIN 3.8   GLOBULIN 2.2   ALT (SGPT) 16   AST (SGOT) 21   BILIRUBIN 0.4   ALK PHOS 79         Lab 06/16/24  1146   PROTIME 15.6*   INR 1.18*             Lab 06/16/24 1958   ABO TYPING A   RH TYPING Positive   ANTIBODY SCREEN Negative         Brief Urine Lab Results  (Last result in the past 365 days)        Color   Clarity   Blood   Leuk Est   Nitrite   Protein   CREAT   Urine HCG        06/16/24 2117 Yellow   Clear   Trace   Negative   Negative   Negative                   Cultures:  No results found for: \"BLOODCX\", \"URINECX\", \"WOUNDCX\", \"MRSACX\", \"RESPCX\", \"STOOLCX\"    Microbiology Results Abnormal       None            CT Angiogram Abdomen Pelvis    Result Date: 6/16/2024  CT ANGIOGRAM ABDOMEN PELVIS Date of Exam: 6/16/2024 12:59 PM EDT Indication: rectal bleeding. Comparison: 7/20/2023 Technique: CTA of the abdomen and pelvis was performed after the uneventful intravenous administration of 100 cc Isovue-370 IV contrast . Reconstructed coronal and sagittal images were also obtained. In addition, a 3-D volume rendered image was created for interpretation. Automated exposure control and iterative reconstruction methods were used. Findings: The descending thoracic aorta is normal. The origins of the celiac axis and superior mesenteric arteries are patent. Severe narrowing of the left renal artery origin. Mild atheromatous disease of the origin of the right renal artery. Inferior mesenteric artery is patent. No aneurysm. The common iliac arteries are normal. Calcified granulomata at the lung bases. Calcified granulomata within the liver and spleen. The pancreas is normal. Previous cholecystectomy. 2.5 cm left " adrenal benign adenoma. There is a filling 0.8 cm exophytic hyperdense left renal lesion consistent with a proteinaceous or hemorrhagic cyst. 1.6 cm exophytic left renal cyst. 2 cm right renal cyst. No hydroureteronephrosis. The urinary bladder is normal. The small bowel is nonobstructed. Colonic diverticulosis without evidence of diverticulitis. Appendectomy. No ascites or pneumoperitoneum. No adenopathy. Degenerative changes of the hips and lumbar spine. Prior lumbar spine fusion     Impression: Colonic diverticulosis without evidence of diverticulitis. No etiology for the patient's rectal bleeding is definitively identified. Electronically Signed: Max Sanchez MD  6/16/2024 1:27 PM EDT  Workstation ID: [id number]     Results for orders placed during the hospital encounter of 07/20/23    Adult Transesophageal Echo 3D ([facility]) W/ Cont If Necessary Per Protocol    Interpretation Summary    The aortic valve is abnormal in structure. There is calcification of the aortic valve. The aortic valve appears trileaflet. Trace aortic valve regurgitation is present. No aortic valve stenosis is present.    There is moderate calcification of the mitral valve posterior leaflet. No evidence of a mitral valve mass is present. Mild mitral valve regurgitation is present. No significant mitral valve stenosis is present.    The tricuspid valve is structurally normal with no significant stenosis present. Mild tricuspid valve regurgitation is present. No TV vegetation.    The pulmonic valve is structurally normal with no significant stenosis present. There is trace pulmonic valve regurgitation present.    There is no evidence of pericardial effusion. There is a well circumscribed 2.7 x 1.6cm mass in the L AV groove consistent with epicardial fat      Current medications:  Scheduled Meds:[Held by provider] amLODIPine, 10 mg, Oral, QAM  [Held by provider] bumetanide, 2 mg, Oral, Daily  insulin regular, 2-9 Units, Subcutaneous,  Q6H  metoclopramide, 5 mg, Intravenous, Q6H  [Held by provider] nebivolol, 10 mg, Oral, QAM  oxybutynin XL, 5 mg, Oral, BID  pantoprazole, 40 mg, Intravenous, Q12H  sertraline, 100 mg, Oral, QAM  sodium chloride, 10 mL, Intravenous, Q12H      Continuous Infusions:     PRN Meds:.  dextrose    dextrose    glucagon (human recombinant)    nitroglycerin    ondansetron    sodium chloride    sodium chloride    sodium chloride    Assessment & Plan   Assessment & Plan     Active Hospital Problems    Diagnosis  POA    **GI bleed [K92.2]  Yes    ABLA (acute blood loss anemia) [D62]  Unknown    Sjogren syndrome, unspecified [M35.00]  Yes    PHYLLIS on CPAP [G47.33]  Yes    DDD (degenerative disc disease), lumbar [M51.36]  Yes    Controlled type 2 diabetes mellitus without complication, with long-term current use of insulin [E11.9, Z79.4]  Not Applicable      Resolved Hospital Problems   No resolved problems to display.        Brief Hospital Course to date:  Acute blood loss anemia  Acute lower GI bleed  Hematochezia   -Consult GI  -Hemoglobin initially 11 on admission now it is roughly 8 I suspect component of hemoconcentration  -Trend Hbg q6hr, type and screen, transfuse for Hbg <7   -CL diet then NPO after midnight   -PPI   -06/18: 1 unit PRBC ordered   -06/18: Colonoscopy performed.  Most likely diverticular bleed per my discussion with Dr. Brunner      Type II DM  -Accuchecks ACHS w SSI   -On ozempic      Hx PE  -Provoked due to immobility after back surgery approximately 3 years ago  -Will discontinue      Dysuria  Hx frequent UTIs  -UA reviewed, not concerning for infection      Mild neurocognitive disorder  -Diagnosis made from Neurology 4/2024 based on chart review   - assists   -PT/OT when appropriate      HTN   -On bumex, norvasc, bystolic outpatient, will hold   -ECHO 07/2023: EF:61-65%     Gout  -continue febuxostat once PO tolerated      PHYLLIS  -CPAP at night    Expected Discharge Location and Transportation: Home  with home health  Expected Discharge 06/19/2024  Expected Discharge Date: 6/18/2024; Expected Discharge Time:      VTE Prophylaxis:  Mechanical VTE prophylaxis orders are present.         AM-PAC 6 Clicks Score (PT): 18 (06/17/24 2000)    CODE STATUS:   Code Status and Medical Interventions:   Ordered at: 06/16/24 1834     Level Of Support Discussed With:    Patient     Code Status (Patient has no pulse and is not breathing):    No CPR (Do Not Attempt to Resuscitate)     Medical Interventions (Patient has pulse or is breathing):    Full Support       Nimisha Iyer MD  06/18/24

## 2024-06-18 NOTE — ANESTHESIA POSTPROCEDURE EVALUATION
Patient: Lissett Lowry    Procedure Summary       Date: 06/18/24 Room / Location:  [facility]    Anesthesia Start: 0953 Anesthesia Stop: 1106    Procedures:       COLONOSCOPY      ESOPHAGOGASTRODUODENOSCOPY Diagnosis:       Gastrointestinal hemorrhage associated with anorectal source      ABLA (acute blood loss anemia)      (Gastrointestinal hemorrhage associated with anorectal source [K62.5])      (ABLA (acute blood loss anemia) [D62])    Surgeons: Brunner, Mark I, MD Provider: Amando Galdamez MD    Anesthesia Type: general ASA Status: 3            Anesthesia Type: general    Vitals  No vitals data found for the desired time range.          Post Anesthesia Care and Evaluation    Patient location during evaluation: PACU  Patient participation: complete - patient participated  Level of consciousness: awake and alert  Pain management: adequate    Airway patency: patent  Anesthetic complications: No anesthetic complications  PONV Status: none  Cardiovascular status: hemodynamically stable and acceptable  Respiratory status: nonlabored ventilation, acceptable and nasal cannula  Hydration status: acceptable    Comments: 97.1 rr14 136/52 hr 75 96%

## 2024-06-19 ENCOUNTER — APPOINTMENT (OUTPATIENT)
Dept: NUCLEAR MEDICINE | Facility: HOSPITAL | Age: 74
DRG: 378 | End: 2024-06-19
Payer: MEDICARE

## 2024-06-19 LAB
ANION GAP SERPL CALCULATED.3IONS-SCNC: 6 MMOL/L (ref 5–15)
BH BB BLOOD EXPIRATION DATE: NORMAL
BH BB BLOOD TYPE BARCODE: 6200
BH BB DISPENSE STATUS: NORMAL
BH BB PRODUCT CODE: NORMAL
BH BB UNIT NUMBER: NORMAL
BUN SERPL-MCNC: 17 MG/DL (ref 8–23)
BUN/CREAT SERPL: 25.4 (ref 7–25)
CALCIUM SPEC-SCNC: 8.9 MG/DL (ref 8.6–10.5)
CHLORIDE SERPL-SCNC: 116 MMOL/L (ref 98–107)
CO2 SERPL-SCNC: 23 MMOL/L (ref 22–29)
CREAT SERPL-MCNC: 0.67 MG/DL (ref 0.57–1)
CROSSMATCH INTERPRETATION: NORMAL
CYTO UR: NORMAL
EGFRCR SERPLBLD CKD-EPI 2021: 92.4 ML/MIN/1.73
GLUCOSE BLDC GLUCOMTR-MCNC: 110 MG/DL (ref 70–130)
GLUCOSE BLDC GLUCOMTR-MCNC: 122 MG/DL (ref 70–130)
GLUCOSE BLDC GLUCOMTR-MCNC: 125 MG/DL (ref 70–130)
GLUCOSE BLDC GLUCOMTR-MCNC: 158 MG/DL (ref 70–130)
GLUCOSE SERPL-MCNC: 102 MG/DL (ref 65–99)
HCT VFR BLD AUTO: 20.5 % (ref 34–46.6)
HCT VFR BLD AUTO: 21.2 % (ref 34–46.6)
HCT VFR BLD AUTO: 21.6 % (ref 34–46.6)
HCT VFR BLD AUTO: 27 % (ref 34–46.6)
HGB BLD-MCNC: 6.8 G/DL (ref 12–15.9)
HGB BLD-MCNC: 7 G/DL (ref 12–15.9)
HGB BLD-MCNC: 7.1 G/DL (ref 12–15.9)
HGB BLD-MCNC: 9.1 G/DL (ref 12–15.9)
LAB AP CASE REPORT: NORMAL
LAB AP CLINICAL INFORMATION: NORMAL
MAGNESIUM SERPL-MCNC: 1.3 MG/DL (ref 1.6–2.4)
PATH REPORT.FINAL DX SPEC: NORMAL
PATH REPORT.GROSS SPEC: NORMAL
PHOSPHATE SERPL-MCNC: 2.4 MG/DL (ref 2.5–4.5)
POTASSIUM SERPL-SCNC: 3.9 MMOL/L (ref 3.5–5.2)
POTASSIUM SERPL-SCNC: 4.2 MMOL/L (ref 3.5–5.2)
SODIUM SERPL-SCNC: 145 MMOL/L (ref 136–145)
UNIT  ABO: NORMAL
UNIT  RH: NORMAL

## 2024-06-19 PROCEDURE — 84100 ASSAY OF PHOSPHORUS: CPT | Performed by: FAMILY MEDICINE

## 2024-06-19 PROCEDURE — 80048 BASIC METABOLIC PNL TOTAL CA: CPT | Performed by: FAMILY MEDICINE

## 2024-06-19 PROCEDURE — 85014 HEMATOCRIT: CPT | Performed by: INTERNAL MEDICINE

## 2024-06-19 PROCEDURE — C1894 INTRO/SHEATH, NON-LASER: HCPCS

## 2024-06-19 PROCEDURE — 36430 TRANSFUSION BLD/BLD COMPNT: CPT

## 2024-06-19 PROCEDURE — 85018 HEMOGLOBIN: CPT | Performed by: INTERNAL MEDICINE

## 2024-06-19 PROCEDURE — 86900 BLOOD TYPING SEROLOGIC ABO: CPT

## 2024-06-19 PROCEDURE — 86923 COMPATIBILITY TEST ELECTRIC: CPT

## 2024-06-19 PROCEDURE — 97161 PT EVAL LOW COMPLEX 20 MIN: CPT

## 2024-06-19 PROCEDURE — A9560 TC99M LABELED RBC: HCPCS | Performed by: FAMILY MEDICINE

## 2024-06-19 PROCEDURE — 84132 ASSAY OF SERUM POTASSIUM: CPT | Performed by: FAMILY MEDICINE

## 2024-06-19 PROCEDURE — 78278 ACUTE GI BLOOD LOSS IMAGING: CPT

## 2024-06-19 PROCEDURE — 05HB33Z INSERTION OF INFUSION DEVICE INTO RIGHT BASILIC VEIN, PERCUTANEOUS APPROACH: ICD-10-PCS | Performed by: FAMILY MEDICINE

## 2024-06-19 PROCEDURE — 83735 ASSAY OF MAGNESIUM: CPT | Performed by: FAMILY MEDICINE

## 2024-06-19 PROCEDURE — 99232 SBSQ HOSP IP/OBS MODERATE 35: CPT | Performed by: PHYSICIAN ASSISTANT

## 2024-06-19 PROCEDURE — 25010000002 MAGNESIUM SULFATE 2 GM/50ML SOLUTION: Performed by: FAMILY MEDICINE

## 2024-06-19 PROCEDURE — 99233 SBSQ HOSP IP/OBS HIGH 50: CPT | Performed by: FAMILY MEDICINE

## 2024-06-19 PROCEDURE — C1751 CATH, INF, PER/CENT/MIDLINE: HCPCS

## 2024-06-19 PROCEDURE — 25010000002 METOCLOPRAMIDE PER 10 MG: Performed by: INTERNAL MEDICINE

## 2024-06-19 PROCEDURE — P9016 RBC LEUKOCYTES REDUCED: HCPCS

## 2024-06-19 PROCEDURE — 0 TECHNETIUM LABELED RED BLOOD CELLS: Performed by: FAMILY MEDICINE

## 2024-06-19 PROCEDURE — 82948 REAGENT STRIP/BLOOD GLUCOSE: CPT

## 2024-06-19 RX ORDER — ACETAMINOPHEN 160 MG/5ML
650 SOLUTION ORAL ONCE
Status: COMPLETED | OUTPATIENT
Start: 2024-06-19 | End: 2024-06-19

## 2024-06-19 RX ORDER — MAGNESIUM SULFATE HEPTAHYDRATE 40 MG/ML
2 INJECTION, SOLUTION INTRAVENOUS
Qty: 150 ML | Refills: 0 | Status: COMPLETED | OUTPATIENT
Start: 2024-06-19 | End: 2024-06-19

## 2024-06-19 RX ORDER — SODIUM CHLORIDE 0.9 % (FLUSH) 0.9 %
10 SYRINGE (ML) INJECTION EVERY 12 HOURS SCHEDULED
Status: DISCONTINUED | OUTPATIENT
Start: 2024-06-19 | End: 2024-06-20 | Stop reason: HOSPADM

## 2024-06-19 RX ORDER — SODIUM CHLORIDE 0.9 % (FLUSH) 0.9 %
20 SYRINGE (ML) INJECTION AS NEEDED
Status: DISCONTINUED | OUTPATIENT
Start: 2024-06-19 | End: 2024-06-20 | Stop reason: HOSPADM

## 2024-06-19 RX ORDER — ACETAMINOPHEN 650 MG/1
650 SUPPOSITORY RECTAL ONCE
Status: COMPLETED | OUTPATIENT
Start: 2024-06-19 | End: 2024-06-19

## 2024-06-19 RX ORDER — ACETAMINOPHEN 325 MG/1
650 TABLET ORAL ONCE
Status: COMPLETED | OUTPATIENT
Start: 2024-06-19 | End: 2024-06-19

## 2024-06-19 RX ORDER — SODIUM CHLORIDE 0.9 % (FLUSH) 0.9 %
10 SYRINGE (ML) INJECTION AS NEEDED
Status: DISCONTINUED | OUTPATIENT
Start: 2024-06-19 | End: 2024-06-20 | Stop reason: HOSPADM

## 2024-06-19 RX ADMIN — MAGNESIUM SULFATE HEPTAHYDRATE 2 G: 2 INJECTION, SOLUTION INTRAVENOUS at 11:20

## 2024-06-19 RX ADMIN — Medication 10 ML: at 21:31

## 2024-06-19 RX ADMIN — ACETAMINOPHEN 650 MG: 325 TABLET ORAL at 08:48

## 2024-06-19 RX ADMIN — MAGNESIUM SULFATE HEPTAHYDRATE 2 G: 2 INJECTION, SOLUTION INTRAVENOUS at 08:44

## 2024-06-19 RX ADMIN — MAGNESIUM SULFATE HEPTAHYDRATE 2 G: 2 INJECTION, SOLUTION INTRAVENOUS at 15:33

## 2024-06-19 RX ADMIN — SERTRALINE 100 MG: 100 TABLET, FILM COATED ORAL at 05:22

## 2024-06-19 RX ADMIN — Medication 10 ML: at 08:42

## 2024-06-19 RX ADMIN — TECHNETIUM TC 99M-LABELED RED BLOOD CELLS 1 DOSE: KIT at 12:30

## 2024-06-19 RX ADMIN — METOCLOPRAMIDE 5 MG: 5 INJECTION, SOLUTION INTRAMUSCULAR; INTRAVENOUS at 19:54

## 2024-06-19 RX ADMIN — PANTOPRAZOLE SODIUM 40 MG: 40 INJECTION, POWDER, FOR SOLUTION INTRAVENOUS at 21:30

## 2024-06-19 RX ADMIN — OXYBUTYNIN CHLORIDE 5 MG: 5 TABLET, EXTENDED RELEASE ORAL at 08:43

## 2024-06-19 NOTE — PROGRESS NOTES
[facility] Medicine Services  PROGRESS NOTE    Patient Name: Lissett Lowry  : 1950  MRN: [id number]    Date of Admission: 2024  Primary Care Physician: Konstantin Gibsno MD    Subjective   Subjective     CC:  Bright red blood per rectum.    HPI:  No further bleeding overnight. Hemodynamically stable.  However did have some dark mucus stool with bright red blood after rounds.  GI notified.  Will order another 2 units PRBC.      Objective   Objective     Vital Signs:   Temp:  [97.1 °F (36.2 °C)-98.5 °F (36.9 °C)] 98.2 °F (36.8 °C)  Heart Rate:  [71-78] 73  Resp:  [18] 18  BP: (113-136)/(48-69) 122/55     Physical Exam:  Constitutional:  female no acute distress, awake, alert  HENT: NCAT, mucous membranes moist  Respiratory: Clear to auscultation bilaterally, respiratory effort normal   Cardiovascular: RRR, no murmurs, rubs, or gallops  Gastrointestinal: Positive bowel sounds, soft, tender in right lower quadrant and left lower quadrant no guarding or rebound  Musculoskeletal: No bilateral ankle edema  Psychiatric: Appropriate affect, cooperative  Neurologic: Oriented x 3, speech clear  Skin: No rashes    Results Reviewed:  LAB RESULTS:      Lab 24  0014 24  1847 24  1215 24  0730 24  0057 24  1232 24  0631 24  1619 24  1331 24  1146   WBC  --   --   --   --   --   --  7.63  --  8.54 7.06   HEMOGLOBIN 7.1* 7.3* 6.8* 7.9* 7.0*   < > 8.4*   < > 11.2* 11.8*   HEMATOCRIT 21.6* 21.8* 20.9* 24.0* 21.2*   < > 25.7*   < > 33.2* 34.6   PLATELETS  --   --   --   --   --   --  206  --  214 226   NEUTROS ABS  --   --   --   --   --   --  5.49  --  6.75 5.32   IMMATURE GRANS (ABS)  --   --   --   --   --   --  0.06*  --  0.01 0.02   LYMPHS ABS  --   --   --   --   --   --  1.57  --  1.19 1.21   MONOS ABS  --   --   --   --   --   --  0.49  --  0.55 0.48   EOS ABS  --   --   --   --   --   --  0.00  --  0.01 0.01   MCV   "--   --   --   --   --   --  97.0  --  96.2 93.5   PROTIME  --   --   --   --   --   --   --   --   --  15.6*    < > = values in this interval not displayed.         Lab 06/19/24  0650 06/19/24  0014 06/18/24  1215 06/17/24  0631 06/16/24  1146   SODIUM 145  --  146* 144 144   POTASSIUM 3.9 4.2 3.3* 3.7 3.6   CHLORIDE 116*  --  113* 110* 108*   CO2 23.0  --  23.0 28.0 25.3   ANION GAP 6.0  --  10.0 6.0 10.7   BUN 17  --  23 30* 23   CREATININE 0.67  --  0.93 1.03* 0.87   EGFR 92.4  --  65.0 57.5* 70.5   GLUCOSE 102*  --  131* 88 93   CALCIUM 8.9  --  8.9 9.4 9.8   MAGNESIUM 1.3*  --   --   --   --    PHOSPHORUS 2.4*  --   --   --   --          Lab 06/16/24  1146   TOTAL PROTEIN 6.0   ALBUMIN 3.8   GLOBULIN 2.2   ALT (SGPT) 16   AST (SGOT) 21   BILIRUBIN 0.4   ALK PHOS 79         Lab 06/16/24  1146   PROTIME 15.6*   INR 1.18*             Lab 06/16/24 1958   ABO TYPING A   RH TYPING Positive   ANTIBODY SCREEN Negative         Brief Urine Lab Results  (Last result in the past 365 days)        Color   Clarity   Blood   Leuk Est   Nitrite   Protein   CREAT   Urine HCG        06/16/24 2117 Yellow   Clear   Trace   Negative   Negative   Negative                   Cultures:  No results found for: \"BLOODCX\", \"URINECX\", \"WOUNDCX\", \"MRSACX\", \"RESPCX\", \"STOOLCX\"    Microbiology Results Abnormal       None            No radiology results from the last 24 hrs    Results for orders placed during the hospital encounter of 07/20/23    Adult Transesophageal Echo 3D ([facility]) W/ Cont If Necessary Per Protocol    Interpretation Summary    The aortic valve is abnormal in structure. There is calcification of the aortic valve. The aortic valve appears trileaflet. Trace aortic valve regurgitation is present. No aortic valve stenosis is present.    There is moderate calcification of the mitral valve posterior leaflet. No evidence of a mitral valve mass is present. Mild mitral valve regurgitation is present. No significant mitral valve stenosis " is present.    The tricuspid valve is structurally normal with no significant stenosis present. Mild tricuspid valve regurgitation is present. No TV vegetation.    The pulmonic valve is structurally normal with no significant stenosis present. There is trace pulmonic valve regurgitation present.    There is no evidence of pericardial effusion. There is a well circumscribed 2.7 x 1.6cm mass in the L AV groove consistent with epicardial fat      Current medications:  Scheduled Meds:[Held by provider] amLODIPine, 10 mg, Oral, QAM  [Held by provider] bumetanide, 2 mg, Oral, Daily  insulin regular, 2-9 Units, Subcutaneous, Q6H  magnesium sulfate, 2 g, Intravenous, Q2H  metoclopramide, 5 mg, Intravenous, Q6H  [Held by provider] nebivolol, 10 mg, Oral, QAM  oxybutynin XL, 5 mg, Oral, Daily  pantoprazole, 40 mg, Intravenous, Q12H  sertraline, 100 mg, Oral, QAM  sodium chloride, 10 mL, Intravenous, Q12H      Continuous Infusions:lactated ringers, 30 mL/hr        PRN Meds:.  Calcium Replacement - Follow Nurse / BPA Driven Protocol    dextrose    dextrose    glucagon (human recombinant)    lactated ringers    Magnesium Standard Dose Replacement - Follow Nurse / BPA Driven Protocol    nitroglycerin    ondansetron    Phosphorus Replacement - Follow Nurse / BPA Driven Protocol    Potassium Replacement - Follow Nurse / BPA Driven Protocol    sodium chloride    sodium chloride    sodium chloride    Assessment & Plan   Assessment & Plan     Active Hospital Problems    Diagnosis  POA    **GI bleed [K92.2]  Yes    ABLA (acute blood loss anemia) [D62]  Unknown    Sjogren syndrome, unspecified [M35.00]  Yes    PHYLLIS on CPAP [G47.33]  Yes    DDD (degenerative disc disease), lumbar [M51.36]  Yes    Controlled type 2 diabetes mellitus without complication, with long-term current use of insulin [E11.9, Z79.4]  Not Applicable      Resolved Hospital Problems   No resolved problems to display.        Brief Hospital Course to date:  Acute blood  loss anemia  Acute lower GI bleed  Hematochezia   -Consult GI  -Hemoglobin initially 11 on admission now it is roughly 8 I suspect component of hemoconcentration  -Trend Hbg q6hr, type and screen, transfuse for Hbg <7   -CL diet then NPO after midnight   -PPI   -06/18: 1 unit PRBC ordered   -06/18: Colonoscopy performed.  Most likely diverticular bleed per my discussion with Dr. Brunner   -06/19: 2 units PRBCs ordered this AM     Type II DM  -Accuchecks ACHS w SSI   -On ozempic      Hx PE  -Provoked due to immobility after back surgery approximately 3 years ago  -Will discontinue      Dysuria  Hx frequent UTIs  -UA reviewed, not concerning for infection      Mild neurocognitive disorder  -Diagnosis made from Neurology 4/2024 based on chart review   - assists   -PT/OT when appropriate      HTN   -On bumex, norvasc, bystolic outpatient, will hold   -ECHO 07/2023: EF:61-65%     Gout  -continue febuxostat once PO tolerated      PHYLLIS  -CPAP at night    Expected Discharge Location and Transportation: Home with home health  Expected Discharge 06/19/2024  Expected Discharge Date: 6/19/2024; Expected Discharge Time:      VTE Prophylaxis:  Mechanical VTE prophylaxis orders are present.         AM-PAC 6 Clicks Score (PT): 18 (06/18/24 2000)    CODE STATUS:   Code Status and Medical Interventions:   Ordered at: 06/16/24 1761     Level Of Support Discussed With:    Patient     Code Status (Patient has no pulse and is not breathing):    No CPR (Do Not Attempt to Resuscitate)     Medical Interventions (Patient has pulse or is breathing):    Full Support       Nimisha Iyer MD  06/19/24

## 2024-06-19 NOTE — PLAN OF CARE
Goal Outcome Evaluation:  Plan of Care Reviewed With: patient        Progress: no change  Outcome Evaluation: Pt presents with decreased functional mobility and decreased balance. Pt ambulated 325ft with CGA and RW for support. Recommend continued skilled IP PT interventions. Recommend D/C home with assist when medically appropriate.      Anticipated Discharge Disposition (PT): home with assist

## 2024-06-19 NOTE — CASE MANAGEMENT/SOCIAL WORK
Continued Stay Note   [facility]     Patient Name: Lissett Lowry  MRN: [id number]  Today's Date: 6/19/2024    Admit Date: 6/16/2024    Plan: Home with spouse   Discharge Plan       Row Name 06/19/24 1115       Plan    Plan Home with spouse    Patient/Family in Agreement with Plan yes    Plan Comments Discussed patient in MDR. Patient is not medically ready. PICC consult today. GI following. Spoke to patient and spouse at bedside. Her plan is home with spouse to transport. Will await PT's recommendations. CM will continue to follow.    Final Discharge Disposition Code 01 - home or self-care                   Discharge Codes    No documentation.                 Expected Discharge Date and Time       Expected Discharge Date Expected Discharge Time    Jun 21, 2024               Sandy Orr RN

## 2024-06-19 NOTE — THERAPY EVALUATION
Patient Name: Lissett Lowry  : 1950    MRN: [id number]                              Today's Date: 2024       Admit Date: 2024    Visit Dx:     ICD-10-CM ICD-9-CM   1. Gastrointestinal hemorrhage, unspecified gastrointestinal hemorrhage type  K92.2 578.9   2. Gastrointestinal hemorrhage associated with anorectal source  K62.5 569.3   3. ABLA (acute blood loss anemia)  D62 285.1     Patient Active Problem List   Diagnosis    Obesity    Controlled type 2 diabetes mellitus without complication, with long-term current use of insulin    Osteoporosis    Gout    Right leg pain    Right foot pain    Chronic right SI joint pain    Chronic bilateral low back pain without sciatica    BMI 45.0-49.9, adult    Primary osteoarthritis of left knee    Diabetes mellitus    DDD (degenerative disc disease), lumbar    Retrolisthesis of vertebrae    Physical deconditioning    Arthritis of left knee    Status post total left knee replacement    HTN (hypertension)    HLD (hyperlipidemia)    PHYLLIS on CPAP    CKD (chronic kidney disease)    Acute blood loss anemia, mild, asymptomatic    SBO (small bowel obstruction)    Sjogren syndrome, unspecified    Severe malnutrition    Incisional hernia without obstruction or gangrene    GI bleed    ABLA (acute blood loss anemia)     Past Medical History:   Diagnosis Date    Anemia     Chronic kidney disease, stage 3     Chronic pain in left shoulder     Depression     Diabetes mellitus     Disease of thyroid gland     as a child- underactive     Elevated cholesterol     Gout     Heart disease     Heart valve disease     [facility]    History of Clostridium difficile infection     History of kidney stones     Hyperlipidemia     Hypertension     IBS (irritable bowel syndrome)     Incisional hernia with bowel obstruction 2023    Kidney stone     Obesity     PHYLLSI on CPAP     CPAP compliant, pressure setting 10    Osteoarthritis of right elbow     Osteoporosis      Primary osteoarthritis of left knee     Rotator cuff tear     Sjogren's syndrome     SLE (systemic lupus erythematosus)     in remission    SOB (shortness of breath)     Staphylococcal infection 07/2023    completed 1 month of abx infusions    Wears glasses      Past Surgical History:   Procedure Laterality Date    APPENDECTOMY      CARDIAC CATHETERIZATION      no stents    CATARACT EXTRACTION      bilateral    CHOLECYSTECTOMY      COLONOSCOPY  2017    COLONOSCOPY N/A 6/18/2024    Procedure: COLONOSCOPY;  Surgeon: Brunner, Mark I, MD;  Location:  [facility];  Service: Gastroenterology;  Laterality: N/A;    ENDOSCOPY      ENDOSCOPY N/A 6/18/2024    Procedure: ESOPHAGOGASTRODUODENOSCOPY;  Surgeon: Brunner, Mark I, MD;  Location:  [facility];  Service: Gastroenterology;  Laterality: N/A;    HYSTERECTOMY      LUMBAR FUSION  2022    OTHER SURGICAL HISTORY      Vitrectomy    TOTAL KNEE ARTHROPLASTY Left 08/26/2019    Procedure: TOTAL KNEE ARTHROPLASTY LEFT;  Surgeon: Rony Rojas MD;  Location:  [facility];  Service: Orthopedics    VENTRAL/INCISIONAL HERNIA REPAIR N/A 8/29/2023    Procedure: REPAIR OF INCISIONAL AND UMBILICAL HERNIA REPAIR WITH MESH;  Surgeon: Royal Tineo MD;  Location:  [facility];  Service: General;  Laterality: N/A;      General Information       Row Name 06/19/24 1150          Physical Therapy Time and Intention    Document Type evaluation  -AE     Mode of Treatment physical therapy  -AE       Row Name 06/19/24 1150          General Information    Patient Profile Reviewed yes  -AE     Prior Level of Function independent:;all household mobility;community mobility;gait;transfer;bed mobility;ADL's;dressing;bathing  -AE     Existing Precautions/Restrictions fall  -AE     Barriers to Rehab medically complex  -AE       Row Name 06/19/24 1150          Living Environment    People in Home spouse  -AE       Row Name 06/19/24 1150          Home Main Entrance    Number of Stairs, Main Entrance  none  -AE     Stair Railings, Main Entrance none  -AE       Row Name 06/19/24 1150          Stairs Within Home, Primary    Number of Stairs, Within Home, Primary none  -AE     Stair Railings, Within Home, Primary none  -AE       Row Name 06/19/24 1150          Cognition    Orientation Status (Cognition) oriented x 4  -AE       Row Name 06/19/24 1150          Safety Issues, Functional Mobility    Safety Issues Affecting Function (Mobility) safety precaution awareness;sequencing abilities  -AE     Impairments Affecting Function (Mobility) balance;endurance/activity tolerance;strength  -AE               User Key  (r) = Recorded By, (t) = Taken By, (c) = Cosigned By      Initials Name Provider Type    AE Veto Mata, PT Physical Therapist                   Mobility       Row Name 06/19/24 1151          Bed Mobility    Bed Mobility supine-sit;sit-supine  -AE     Supine-Sit Thompson (Bed Mobility) standby assist  -AE     Sit-Supine Thompson (Bed Mobility) standby assist  -AE     Assistive Device (Bed Mobility) head of bed elevated  -AE     Comment, (Bed Mobility) No cues needed for bed mobility.  -AE       Row Name 06/19/24 1151          Transfers    Comment, (Transfers) VCs for hand placement and sequencing. Pt initially standing without AD, felt unsteady while pushing against wall for support. Given RW for improved safety.  -AE       Row Name 06/19/24 1151          Sit-Stand Transfer    Sit-Stand Thompson (Transfers) contact guard  -AE       Row Name 06/19/24 1151          Gait/Stairs (Locomotion)    Thompson Level (Gait) contact guard;1 person assist;1 person to manage equipment  -AE     Assistive Device (Gait) walker, front-wheeled  -AE     Distance in Feet (Gait) 325  -AE     Deviations/Abnormal Patterns (Gait) bilateral deviations;berkley decreased;gait speed decreased;stride length decreased  -AE     Bilateral Gait Deviations forward flexed posture  -AE     Comment, (Gait/Stairs) Pt demo step  through gait pattern with slowed berkley and decreased gait speed. Pt initially demo unsteadiness while ambulating but able to improve with increased distance. Pt reports feeling better once ambulating. Further distance limited by fatigue.  -AE               User Key  (r) = Recorded By, (t) = Taken By, (c) = Cosigned By      Initials Name Provider Type    AE Veto Mata, PT Physical Therapist                   Obj/Interventions       Row Name 06/19/24 1155          Range of Motion Comprehensive    General Range of Motion bilateral lower extremity ROM WFL  -AE       Row Name 06/19/24 1155          Strength Comprehensive (MMT)    General Manual Muscle Testing (MMT) Assessment lower extremity strength deficits identified  -AE     Comment, General Manual Muscle Testing (MMT) Assessment BLE grossly 4/5; generalized weakness  -AE       Row Name 06/19/24 1155          Balance    Balance Assessment sitting static balance;sitting dynamic balance;sit to stand dynamic balance;standing static balance;standing dynamic balance  -AE     Static Sitting Balance standby assist  -AE     Dynamic Sitting Balance standby assist  -AE     Position, Sitting Balance unsupported;sitting edge of bed  -AE     Sit to Stand Dynamic Balance contact guard;verbal cues  -AE     Static Standing Balance contact guard  -AE     Dynamic Standing Balance contact guard  -AE     Position/Device Used, Standing Balance supported;walker, front-wheeled  -AE       Row Name 06/19/24 1155          Sensory Assessment (Somatosensory)    Sensory Assessment (Somatosensory) LE sensation intact  -AE               User Key  (r) = Recorded By, (t) = Taken By, (c) = Cosigned By      Initials Name Provider Type    AE Veto Mata, PT Physical Therapist                   Goals/Plan       Row Name 06/19/24 1157          Bed Mobility Goal 1 (PT)    Activity/Assistive Device (Bed Mobility Goal 1, PT) sit to supine/supine to sit  -AE     [place] Level/Cues Needed  (Bed Mobility Goal 1, PT) independent  -AE     Time Frame (Bed Mobility Goal 1, PT) short term goal (STG);5 days  -AE     Progress/Outcomes (Bed Mobility Goal 1, PT) new goal  -AE       Row Name 06/19/24 1157          Transfer Goal 1 (PT)    Activity/Assistive Device (Transfer Goal 1, PT) sit-to-stand/stand-to-sit;bed-to-chair/chair-to-bed  -AE     [place] Level/Cues Needed (Transfer Goal 1, PT) independent  -AE     Time Frame (Transfer Goal 1, PT) long term goal (LTG);10 days  -AE     Progress/Outcome (Transfer Goal 1, PT) new goal  -AE       Row Name 06/19/24 1157          Gait Training Goal 1 (PT)    Activity/Assistive Device (Gait Training Goal 1, PT) gait (walking locomotion)  -AE     [place] Level (Gait Training Goal 1, PT) independent  -AE     Distance (Gait Training Goal 1, PT) 500ft  -AE     Time Frame (Gait Training Goal 1, PT) long term goal (LTG);10 days  -AE     Progress/Outcome (Gait Training Goal 1, PT) new goal  -AE       [facility] Name 06/19/24 1156          Therapy Assessment/Plan (PT)    Planned Therapy Interventions (PT) balance training;bed mobility training;gait training;home exercise program;patient/family education;postural re-education;ROM (range of motion);strengthening;transfer training  -AE               User Key  (r) = Recorded By, (t) = Taken By, (c) = Cosigned By      Initials Name Provider Type    AE Veto Mata, PT Physical Therapist                   Clinical Impression       Row Name 06/19/24 1593          Pain    Additional Documentation Pain Scale: FACES Pre/Post-Treatment (Group)  -AE       Row Name 06/19/24 115          Pain Scale: FACES Pre/Post-Treatment    Pain: FACES Scale, Pretreatment 0-->no hurt  -AE     Posttreatment Pain Rating 0-->no hurt  -AE       Row Name 06/19/24 1153          Plan of Care Review    Plan of Care Reviewed With patient  -AE     Progress no change  -AE     Outcome Evaluation Pt presents with decreased functional mobility and decreased  balance. Pt ambulated 325ft with CGA and RW for support. Recommend continued skilled IP PT interventions. Recommend D/C home with assist when medically appropriate.  -AE       Row Name 06/19/24 1155          Therapy Assessment/Plan (PT)    Patient/Family Therapy Goals Statement (PT) Home  -AE     Rehab Potential (PT) good, to achieve stated therapy goals  -AE     Criteria for Skilled Interventions Met (PT) yes  -AE     Therapy Frequency (PT) daily  -AE     Predicted Duration of Therapy Intervention (PT) 1 week  -AE       Row Name 06/19/24 1155          Vital Signs    Pre Systolic BP Rehab 119  -AE     Pre Treatment Diastolic BP 45  -AE     Pretreatment Heart Rate (beats/min) 76  -AE     Posttreatment Heart Rate (beats/min) 69  -AE     Pre SpO2 (%) 97  -AE     O2 Delivery Pre Treatment room air  -AE     O2 Delivery Intra Treatment room air  -AE     Post SpO2 (%) 98  -AE     O2 Delivery Post Treatment room air  -AE     Pre Patient Position Supine  -AE     Intra Patient Position Standing  -AE     Post Patient Position Supine  -AE       Row Name 06/19/24 1155          Positioning and Restraints    Pre-Treatment Position in bed  -AE     Post Treatment Position bed  -AE     In Bed notified nsg;supine;call light within reach;encouraged to call for assist;with family/caregiver;with other staff  -AE               User Key  (r) = Recorded By, (t) = Taken By, (c) = Cosigned By      Initials Name Provider Type    AE Veto Mata, PT Physical Therapist                   Outcome Measures       Row Name 06/19/24 1158 06/19/24 0800       How much help from another person do you currently need...    Turning from your back to your side while in flat bed without using bedrails? 3  -AE 3  -CJ    Moving from lying on back to sitting on the side of a flat bed without bedrails? 3  -AE 3  -CJ    Moving to and from a bed to a chair (including a wheelchair)? 3  -AE 3  -CJ    Standing up from a chair using your arms (e.g., wheelchair,  bedside chair)? 3  -AE 3  -CJ    Climbing 3-5 steps with a railing? 3  -AE 3  -CJ    To walk in hospital room? 3  -AE 3  -CJ    AM-PAC 6 Clicks Score (PT) 18  -AE 18  -CJ    Highest Level of Mobility Goal 6 --> Walk 10 steps or more  -AE 6 --> Walk 10 steps or more  -CJ      Row Name 06/19/24 1158          Functional Assessment    Outcome Measure Options AM-PAC 6 Clicks Basic Mobility (PT)  -AE               User Key  (r) = Recorded By, (t) = Taken By, (c) = Cosigned By      Initials Name Provider Type    AE Veto Mata, PT Physical Therapist    Yancy Macias, RN Registered Nurse                                 Physical Therapy Education       Title: PT OT SLP Therapies (In Progress)       Topic: Physical Therapy (In Progress)       Point: Mobility training (Done)       Learning Progress Summary             Patient Acceptance, E, VU by AE at 6/19/2024 1050                         Point: Home exercise program (Not Started)       Learner Progress:  Not documented in this visit.              Point: Body mechanics (Done)       Learning Progress Summary             Patient Acceptance, E, VU by AE at 6/19/2024 1050                         Point: Precautions (Done)       Learning Progress Summary             Patient Acceptance, E, VU by AE at 6/19/2024 1050                                         User Key       Initials Effective Dates Name Provider Type Discipline    AE 09/21/21 -  Veto Mata, PT Physical Therapist PT                  PT Recommendation and Plan  Planned Therapy Interventions (PT): balance training, bed mobility training, gait training, home exercise program, patient/family education, postural re-education, ROM (range of motion), strengthening, transfer training  Plan of Care Reviewed With: patient  Progress: no change  Outcome Evaluation: Pt presents with decreased functional mobility and decreased balance. Pt ambulated 325ft with CGA and RW for support. Recommend continued skilled IP PT  interventions. Recommend D/C home with assist when medically appropriate.     Time Calculation:   PT Evaluation Complexity  History, PT Evaluation Complexity: 1-2 personal factors and/or comorbidities  Examination of Body Systems (PT Eval Complexity): 1-2 elements  Clinical Presentation (PT Evaluation Complexity): stable  Clinical Decision Making (PT Evaluation Complexity): low complexity  Overall Complexity (PT Evaluation Complexity): low complexity     PT Charges       Row Name 06/19/24 1159             Time Calculation    Start Time 1050  -AE      PT Received On 06/19/24  -AE      PT Goal Re-Cert Due Date 06/29/24  -AE         Untimed Charges    PT Eval/Re-eval Minutes 33  -AE         Total Minutes    Untimed Charges Total Minutes 33  -AE       Total Minutes 33  -AE                User Key  (r) = Recorded By, (t) = Taken By, (c) = Cosigned By      Initials Name Provider Type    AE Veto Mata, PT Physical Therapist                  Therapy Charges for Today       Code Description Service Date Service Provider Modifiers Qty    32062443395 HC PT EVAL LOW COMPLEXITY 3 6/19/2024 Veto Mata, PT GP 1            PT G-Codes  Outcome Measure Options: AM-PAC 6 Clicks Basic Mobility (PT)  AM-PAC 6 Clicks Score (PT): 18  PT Discharge Summary  Anticipated Discharge Disposition (PT): home with assist    Veto Mata PT  6/19/2024

## 2024-06-19 NOTE — PROGRESS NOTES
"GI Daily Progress Note  Subjective:    Chief Complaint:  Bright red blood per rectum    Patient did well overnight but had a large red bloody bowel movement this morning.   She denies abdominal pain, dizziness nor lightheadedness.   Recently ambulated with PT.        at the bedside.       Objective:    /45 (BP Location: Left arm, Patient Position: Lying)   Pulse 73   Temp 97.8 °F (36.6 °C) (Oral)   Resp 18   Ht 167.6 cm (66\")   Wt 74.8 kg (165 lb)   SpO2 98%   BMI 26.63 kg/m²     Physical Exam  Constitutional:       General: She is not in acute distress.     Appearance: She is obese.   Cardiovascular:      Rate and Rhythm: Normal rate and regular rhythm.   Pulmonary:      Effort: Pulmonary effort is normal. No respiratory distress.   Abdominal:      General: Bowel sounds are normal. There is no distension.      Palpations: Abdomen is soft.      Tenderness: There is no abdominal tenderness. There is no guarding.   Neurological:      Mental Status: She is alert and oriented to person, place, and time.         Lab  Lab Results   Component Value Date    WBC 7.63 06/17/2024    HGB 6.8 (C) 06/19/2024    HGB 7.1 (L) 06/19/2024    HGB 7.3 (L) 06/18/2024    MCV 97.0 06/17/2024     06/17/2024    INR 1.18 (H) 06/16/2024    INR 1.06 08/25/2023       Lab Results   Component Value Date    GLUCOSE 102 (H) 06/19/2024    BUN 17 06/19/2024    CREATININE 0.67 06/19/2024    EGFRIFNONA 48 (L) 08/27/2019    BCR 25.4 (H) 06/19/2024     06/19/2024    K 3.9 06/19/2024    CO2 23.0 06/19/2024    CALCIUM 8.9 06/19/2024    ALBUMIN 3.8 06/16/2024    ALKPHOS 79 06/16/2024    BILITOT 0.4 06/16/2024    ALT 16 06/16/2024    AST 21 06/16/2024       Assessment:    Lower gastrointestinal bleeding with hematochezia, suspect diverticular source   CTA Abdomen on 6/16 without active extravasation  S/p EGD and Colonoscopy yesterday.   EGD showed mild gastritis.   Colonoscopy showed old blood in the colon, mainly the left " side.  Clot and old blood removed by suction.  Pandiverticulosis.     Acute blood loss anemia, secondary to above   Anticoagulation use, Xarelto.  Previous DVT/PE, described as provoked.       Plan:    >> Transfuse 2 units of PRBCs  >> Obtain stat nuclear med GI bleeding scan    SHENA Valdovinos  06/19/24  11:25 EDT

## 2024-06-20 ENCOUNTER — READMISSION MANAGEMENT (OUTPATIENT)
Dept: CALL CENTER | Facility: HOSPITAL | Age: 74
End: 2024-06-20
Payer: MEDICARE

## 2024-06-20 VITALS
DIASTOLIC BLOOD PRESSURE: 61 MMHG | HEART RATE: 70 BPM | WEIGHT: 165 LBS | SYSTOLIC BLOOD PRESSURE: 138 MMHG | HEIGHT: 66 IN | TEMPERATURE: 98 F | BODY MASS INDEX: 26.52 KG/M2 | RESPIRATION RATE: 18 BRPM | OXYGEN SATURATION: 95 %

## 2024-06-20 LAB
ANION GAP SERPL CALCULATED.3IONS-SCNC: 7 MMOL/L (ref 5–15)
BH BB BLOOD EXPIRATION DATE: NORMAL
BH BB BLOOD EXPIRATION DATE: NORMAL
BH BB BLOOD TYPE BARCODE: 6200
BH BB BLOOD TYPE BARCODE: 6200
BH BB DISPENSE STATUS: NORMAL
BH BB DISPENSE STATUS: NORMAL
BH BB PRODUCT CODE: NORMAL
BH BB PRODUCT CODE: NORMAL
BH BB UNIT NUMBER: NORMAL
BH BB UNIT NUMBER: NORMAL
BUN SERPL-MCNC: 12 MG/DL (ref 8–23)
BUN/CREAT SERPL: 16.9 (ref 7–25)
CALCIUM SPEC-SCNC: 9.1 MG/DL (ref 8.6–10.5)
CHLORIDE SERPL-SCNC: 112 MMOL/L (ref 98–107)
CO2 SERPL-SCNC: 25 MMOL/L (ref 22–29)
CREAT SERPL-MCNC: 0.71 MG/DL (ref 0.57–1)
CROSSMATCH INTERPRETATION: NORMAL
CROSSMATCH INTERPRETATION: NORMAL
EGFRCR SERPLBLD CKD-EPI 2021: 89.9 ML/MIN/1.73
GLUCOSE BLDC GLUCOMTR-MCNC: 108 MG/DL (ref 70–130)
GLUCOSE BLDC GLUCOMTR-MCNC: 109 MG/DL (ref 70–130)
GLUCOSE SERPL-MCNC: 111 MG/DL (ref 65–99)
HCT VFR BLD AUTO: 29.4 % (ref 34–46.6)
HGB BLD-MCNC: 9.7 G/DL (ref 12–15.9)
MAGNESIUM SERPL-MCNC: 1.8 MG/DL (ref 1.6–2.4)
PHOSPHATE SERPL-MCNC: 2.9 MG/DL (ref 2.5–4.5)
POTASSIUM SERPL-SCNC: 3.7 MMOL/L (ref 3.5–5.2)
SODIUM SERPL-SCNC: 144 MMOL/L (ref 136–145)
UNIT  ABO: NORMAL
UNIT  ABO: NORMAL
UNIT  RH: NORMAL
UNIT  RH: NORMAL

## 2024-06-20 PROCEDURE — 84100 ASSAY OF PHOSPHORUS: CPT | Performed by: FAMILY MEDICINE

## 2024-06-20 PROCEDURE — 99231 SBSQ HOSP IP/OBS SF/LOW 25: CPT | Performed by: PHYSICIAN ASSISTANT

## 2024-06-20 PROCEDURE — 85018 HEMOGLOBIN: CPT | Performed by: INTERNAL MEDICINE

## 2024-06-20 PROCEDURE — 82948 REAGENT STRIP/BLOOD GLUCOSE: CPT

## 2024-06-20 PROCEDURE — 99239 HOSP IP/OBS DSCHRG MGMT >30: CPT | Performed by: INTERNAL MEDICINE

## 2024-06-20 PROCEDURE — 83735 ASSAY OF MAGNESIUM: CPT | Performed by: FAMILY MEDICINE

## 2024-06-20 PROCEDURE — 25010000002 METOCLOPRAMIDE PER 10 MG: Performed by: INTERNAL MEDICINE

## 2024-06-20 PROCEDURE — 80048 BASIC METABOLIC PNL TOTAL CA: CPT | Performed by: FAMILY MEDICINE

## 2024-06-20 PROCEDURE — 85014 HEMATOCRIT: CPT | Performed by: INTERNAL MEDICINE

## 2024-06-20 RX ADMIN — METOCLOPRAMIDE 5 MG: 5 INJECTION, SOLUTION INTRAMUSCULAR; INTRAVENOUS at 02:35

## 2024-06-20 RX ADMIN — SERTRALINE 100 MG: 100 TABLET, FILM COATED ORAL at 08:25

## 2024-06-20 RX ADMIN — Medication 10 ML: at 08:26

## 2024-06-20 RX ADMIN — OXYBUTYNIN CHLORIDE 5 MG: 5 TABLET, EXTENDED RELEASE ORAL at 08:25

## 2024-06-20 NOTE — CASE MANAGEMENT/SOCIAL WORK
Case Management Discharge Note      Final Note: Patient is discharging today. CM spoke to patient at bedside. Her plan is home with her spouse to transport. No needs noted.         Selected Continued Care - Admitted Since 6/16/2024       Destination    No services have been selected for the patient.                Durable Medical Equipment    No services have been selected for the patient.                Dialysis/Infusion    No services have been selected for the patient.                Home Medical Care    No services have been selected for the patient.                Therapy    No services have been selected for the patient.                Community Resources    No services have been selected for the patient.                Community & DME    No services have been selected for the patient.                         Final Discharge Disposition Code: 01 - home or self-care

## 2024-06-20 NOTE — PROGRESS NOTES
"GI Daily Progress Note  Subjective:    Chief Complaint:  Follow up lower gastrointestinal bleeding     Feels well today.  Denies any recurrence in bleeding.  Had a brown, nonbloody bowel movement this morning.       Objective:    /55 (BP Location: Left arm, Patient Position: Lying)   Pulse 62   Temp 98.2 °F (36.8 °C) (Oral)   Resp 18   Ht 167.6 cm (66\")   Wt 74.8 kg (165 lb)   SpO2 93%   BMI 26.63 kg/m²     Physical Exam  Constitutional:       General: She is not in acute distress.  Cardiovascular:      Rate and Rhythm: Normal rate and regular rhythm.   Pulmonary:      Effort: Pulmonary effort is normal. No respiratory distress.   Abdominal:      General: Bowel sounds are normal. There is no distension.      Palpations: Abdomen is soft.      Tenderness: There is no abdominal tenderness.   Neurological:      Mental Status: She is alert and oriented to person, place, and time.         Lab  Lab Results   Component Value Date    WBC 7.63 06/17/2024    HGB 9.1 (L) 06/19/2024    HGB 7.0 (L) 06/19/2024    HGB 6.8 (C) 06/19/2024    MCV 97.0 06/17/2024     06/17/2024    INR 1.18 (H) 06/16/2024    INR 1.06 08/25/2023       Lab Results   Component Value Date    GLUCOSE 111 (H) 06/20/2024    BUN 12 06/20/2024    CREATININE 0.71 06/20/2024    EGFRIFNONA 48 (L) 08/27/2019    BCR 16.9 06/20/2024     06/20/2024    K 3.7 06/20/2024    CO2 25.0 06/20/2024    CALCIUM 9.1 06/20/2024    ALBUMIN 3.8 06/16/2024    ALKPHOS 79 06/16/2024    BILITOT 0.4 06/16/2024    ALT 16 06/16/2024    AST 21 06/16/2024       Assessment:    Lower gastrointestinal bleeding with hematochezia, suspect diverticular source   CTA Abdomen on 6/16 without active extravasation  S/p EGD and Colonoscopy 6/18.   EGD showed mild gastritis.   Colonoscopy showed old blood in the colon, mainly the left side.  Clot and old blood removed by suction.  Pandiverticulosis.   Nuclear medicine bleeding scan 6/19 negative for active bleeding     Acute " blood loss anemia, secondary to above   Anticoagulation use, Xarelto.  Previous DVT/PE, described as provoked.     Plan:    Appropriate rise in H&H following transfusion yesterday.  No recurrence in bleeding overnight.  Nuclear medicine tagged RBC scan was negative.       >> Begin low residue diet for ~ 2 weeks.   Would then change to high fiber diet indefinitely     Possibly home this afternoon.   Discuss risk/benefit of resuming anticoagulation for described remote provoked DVT/PE    SHENA Valdovinos  06/20/24  09:13 EDT

## 2024-06-20 NOTE — NURSING NOTE
Prior to central line removal, order for the removal of catheter was verified, patient was assessed, necessary materials were gathered and patient was educated regarding procedure .    Patient was positioned flat to ensure that the insertion site was at or below the level of the heart.    Hands were washed, clean gloves were applied and central line dressing was gently removed. Catheter exit site was not cultured.     A new pair of clean gloves were then applied. Insertion site was cleansed with 2% Chlorhexidine swab using a circular motion beginning at the insertion site and moving outward for 30 seconds and allowed to dry.     Clamp on line was not present.     Patient was instructed to perform Valsalva maneuver as catheter was withdrawn.     The central line was grasped at the insertion site and slowly pulled outward parallel to the skin. Resistance was not met.    After central line was completely removed, a sterile 4x4 gauze pad was used to apply light pressure until bleeding stopped. At that time, petroleum-based gauze and a sterile occlusive dressing was applied to exit site.     Patient was instructed to keep dressing in place for at least 24 hours and to remain in a flat or reclining position for 30 minutes post-removal.     Catheter was inspected after removal and was intact. Tip of central line was not sent for culture. Patient tolerated procedure.

## 2024-06-20 NOTE — PLAN OF CARE
Goal Outcome Evaluation:    A/O x4, RA, NSR, VSS, No c/o pain or nausea. SBA to BSC. IS at bedside- encouraged use.    Plan of Care Reviewed With: patient        Progress: no change       Problem: Adult Inpatient Plan of Care  Goal: Plan of Care Review  Outcome: Ongoing, Progressing  Flowsheets (Taken 6/20/2024 0405)  Progress: no change  Plan of Care Reviewed With: patient  Goal: Patient-Specific Goal (Individualized)  Outcome: Ongoing, Progressing  Goal: Absence of Hospital-Acquired Illness or Injury  Outcome: Ongoing, Progressing  Intervention: Identify and Manage Fall Risk  Recent Flowsheet Documentation  Taken 6/20/2024 0403 by Evelyne Stone, RN  Safety Promotion/Fall Prevention:   assistive device/personal items within reach   activity supervised   clutter free environment maintained   nonskid shoes/slippers when out of bed   room organization consistent   safety round/check completed  Taken 6/20/2024 0217 by Evelyne Stone, RN  Safety Promotion/Fall Prevention:   assistive device/personal items within reach   clutter free environment maintained   activity supervised   nonskid shoes/slippers when out of bed   room organization consistent   safety round/check completed  Taken 6/20/2024 0003 by Evelyne Stone, RN  Safety Promotion/Fall Prevention:   assistive device/personal items within reach   activity supervised   clutter free environment maintained   nonskid shoes/slippers when out of bed   safety round/check completed   room organization consistent  Taken 6/19/2024 2200 by Evelyne Stone, RN  Safety Promotion/Fall Prevention:   assistive device/personal items within reach   clutter free environment maintained   nonskid shoes/slippers when out of bed   room organization consistent   safety round/check completed  Taken 6/19/2024 2000 by Evelyne Stone, RN  Safety Promotion/Fall Prevention:   activity supervised   assistive device/personal items within reach   clutter free environment  maintained   nonskid shoes/slippers when out of bed   room organization consistent   safety round/check completed  Intervention: Prevent Skin Injury  Recent Flowsheet Documentation  Taken 6/20/2024 0403 by Evelyne Stone RN  Body Position:   position changed independently   weight shifting  Skin Protection:   adhesive use limited   incontinence pads utilized   skin-to-skin areas padded   transparent dressing maintained   tubing/devices free from skin contact  Taken 6/20/2024 0217 by Evelyne Stone RN  Body Position:   position changed independently   weight shifting  Skin Protection:   adhesive use limited   incontinence pads utilized   skin-to-skin areas padded   transparent dressing maintained   tubing/devices free from skin contact  Taken 6/20/2024 0003 by Evelyne Stone RN  Body Position: position changed independently  Skin Protection:   adhesive use limited   incontinence pads utilized   skin-to-skin areas padded   transparent dressing maintained  Taken 6/19/2024 2200 by Evelyne Stone RN  Body Position:   position changed independently   weight shifting  Skin Protection:   adhesive use limited   incontinence pads utilized   skin-to-skin areas padded   transparent dressing maintained   tubing/devices free from skin contact  Taken 6/19/2024 2000 by Evelyne Stone RN  Body Position:   position changed independently   neutral body alignment  Skin Protection:   adhesive use limited   incontinence pads utilized   skin-to-skin areas padded   transparent dressing maintained   tubing/devices free from skin contact  Intervention: Prevent and Manage VTE (Venous Thromboembolism) Risk  Recent Flowsheet Documentation  Taken 6/20/2024 0403 by Evelyne Stone RN  Activity Management: activity encouraged  Taken 6/20/2024 0217 by Evelyne Stone RN  Activity Management: activity encouraged  Taken 6/20/2024 0003 by Evelyne Stone RN  Activity Management: activity encouraged  Taken 6/19/2024  2200 by Evelyne Stone RN  Activity Management: activity encouraged  VTE Prevention/Management:   bilateral   sequential compression devices on  Taken 6/19/2024 2000 by Evelyne Stone RN  Activity Management: activity encouraged  VTE Prevention/Management:   bilateral   sequential compression devices off  Range of Motion: active ROM (range of motion) encouraged  Intervention: Prevent Infection  Recent Flowsheet Documentation  Taken 6/20/2024 0403 by Evelyne Stone RN  Infection Prevention:   environmental surveillance performed   rest/sleep promoted  Taken 6/20/2024 0217 by Evelyne Stone RN  Infection Prevention:   environmental surveillance performed   rest/sleep promoted  Taken 6/20/2024 0003 by Evelyne Stone RN  Infection Prevention:   environmental surveillance performed   rest/sleep promoted   cohorting utilized  Taken 6/19/2024 2200 by Evelyne Stone RN  Infection Prevention:   environmental surveillance performed   rest/sleep promoted  Taken 6/19/2024 2000 by Evelyne Stone RN  Infection Prevention:   cohorting utilized   environmental surveillance performed   equipment surfaces disinfected   hand hygiene promoted   rest/sleep promoted  Goal: Optimal Comfort and Wellbeing  Outcome: Ongoing, Progressing  Intervention: Provide Person-Centered Care  Recent Flowsheet Documentation  Taken 6/19/2024 2000 by Evelyne Stone RN  Trust Relationship/Rapport:   care explained   choices provided   questions answered   thoughts/feelings acknowledged  Goal: Readiness for Transition of Care  Outcome: Ongoing, Progressing     Problem: Skin Injury Risk Increased  Goal: Skin Health and Integrity  Outcome: Ongoing, Progressing  Intervention: Optimize Skin Protection  Recent Flowsheet Documentation  Taken 6/20/2024 0403 by Evelyne Stone RN  Pressure Reduction Techniques: frequent weight shift encouraged  Head of Bed (HOB) Positioning: HOB elevated  Pressure Reduction Devices:    positioning supports utilized   pressure-redistributing mattress utilized  Skin Protection:   adhesive use limited   incontinence pads utilized   skin-to-skin areas padded   transparent dressing maintained   tubing/devices free from skin contact  Taken 6/20/2024 0217 by Evelyne Stone RN  Pressure Reduction Techniques: frequent weight shift encouraged  Head of Bed (HOB) Positioning: [facility] at 20-30 degrees  Pressure Reduction Devices:   positioning supports utilized   pressure-redistributing mattress utilized  Skin Protection:   adhesive use limited   incontinence pads utilized   skin-to-skin areas padded   transparent dressing maintained   tubing/devices free from skin contact  Taken 6/20/2024 0003 by Evelyne Stone RN  Pressure Reduction Techniques: frequent weight shift encouraged  Head of Bed ([facility]) Positioning: [facility] elevated  Pressure Reduction Devices:   positioning supports utilized   pressure-redistributing mattress utilized  Skin Protection:   adhesive use limited   incontinence pads utilized   skin-to-skin areas padded   transparent dressing maintained  Taken 6/19/2024 2200 by Evelyne Stone RN  Pressure Reduction Techniques: frequent weight shift encouraged  Head of Bed ([facility]) Positioning: [facility] elevated  Pressure Reduction Devices:   positioning supports utilized   pressure-redistributing mattress utilized  Skin Protection:   adhesive use limited   incontinence pads utilized   skin-to-skin areas padded   transparent dressing maintained   tubing/devices free from skin contact  Taken 6/19/2024 2000 by Evelyne Stone RN  Pressure Reduction Techniques: frequent weight shift encouraged  Head of Bed ([facility]) Positioning: [facility] elevated  Pressure Reduction Devices:   positioning supports utilized   pressure-redistributing mattress utilized  Skin Protection:   adhesive use limited   incontinence pads utilized   skin-to-skin areas padded   transparent dressing maintained   tubing/devices free from skin  contact     Problem: Fall Injury Risk  Goal: Absence of Fall and Fall-Related Injury  Outcome: Ongoing, Progressing  Intervention: Identify and Manage Contributors  Recent Flowsheet Documentation  Taken 6/20/2024 0403 by Evelyne Stone RN  Medication Review/Management: medications reviewed  Self-Care Promotion:   BADL personal objects within reach   independence encouraged   BADL personal routines maintained   safe use of adaptive equipment encouraged  Taken 6/20/2024 0217 by Evelyne Stone RN  Medication Review/Management: medications reviewed  Self-Care Promotion:   independence encouraged   BADL personal objects within reach   BADL personal routines maintained   safe use of adaptive equipment encouraged  Taken 6/20/2024 0003 by Evelyne Stone RN  Medication Review/Management: medications reviewed  Self-Care Promotion:   BADL personal objects within reach   independence encouraged   BADL personal routines maintained   safe use of adaptive equipment encouraged  Taken 6/19/2024 2200 by Evelyne Stone RN  Medication Review/Management: medications reviewed  Self-Care Promotion:   independence encouraged   BADL personal objects within reach   BADL personal routines maintained   safe use of adaptive equipment encouraged  Taken 6/19/2024 2000 by Evelyne Stone RN  Medication Review/Management: medications reviewed  Self-Care Promotion:   independence encouraged   BADL personal objects within reach   BADL personal routines maintained   safe use of adaptive equipment encouraged  Intervention: Promote Injury-Free Environment  Recent Flowsheet Documentation  Taken 6/20/2024 0403 by Evelyne Stone RN  Safety Promotion/Fall Prevention:   assistive device/personal items within reach   activity supervised   clutter free environment maintained   nonskid shoes/slippers when out of bed   room organization consistent   safety round/check completed  Taken 6/20/2024 0217 by Evelyne Stone, RN  Safety  Promotion/Fall Prevention:   assistive device/personal items within reach   clutter free environment maintained   activity supervised   nonskid shoes/slippers when out of bed   room organization consistent   safety round/check completed  Taken 6/20/2024 0003 by Evelyne Stone RN  Safety Promotion/Fall Prevention:   assistive device/personal items within reach   activity supervised   clutter free environment maintained   nonskid shoes/slippers when out of bed   safety round/check completed   room organization consistent  Taken 6/19/2024 2200 by Evelyne Stone, RN  Safety Promotion/Fall Prevention:   assistive device/personal items within reach   clutter free environment maintained   nonskid shoes/slippers when out of bed   room organization consistent   safety round/check completed  Taken 6/19/2024 2000 by Evelyne Stone, RN  Safety Promotion/Fall Prevention:   activity supervised   assistive device/personal items within reach   clutter free environment maintained   nonskid shoes/slippers when out of bed   room organization consistent   safety round/check completed

## 2024-06-21 NOTE — OUTREACH NOTE
Prep Survey      Flowsheet Row Responses   [facility] facility patient discharged from? [place]   Is LACE score < 7 ? No   Eligibility [facility]   Date of Admission 06/16/24   Date of Discharge 06/20/24   Discharge Disposition Home or Self Care   Discharge diagnosis GI bleed colonoscopy this visit   Does the patient have one of the following disease processes/diagnoses(primary or secondary)? Other   Does the patient have Home health ordered? No   Is there a DME ordered? No   Prep survey completed? Yes            ESSIE CERRATO - Registered Nurse

## 2024-06-25 NOTE — DISCHARGE SUMMARY
[facility]  DISCHARGE SUMMARY    Patient Name: Lissett Lowry  : 1950  MRN: [id number]    Date of Admission: 2024 10:40 AM  Date of Discharge:  24  Primary Care Physician: Konstantin Gibson MD    Consults       Date and Time Order Name Status Description    2024  3:19 PM Inpatient Gastroenterology Consult Completed             Hospital Course     Presenting Problem: Hematochezia    Active Hospital Problems    Diagnosis  POA    **GI bleed [K92.2]  Yes    ABLA (acute blood loss anemia) [D62]  Unknown    Sjogren syndrome, unspecified [M35.00]  Yes    PHYLLIS on CPAP [G47.33]  Yes    DDD (degenerative disc disease), lumbar [M51.36]  Yes    Controlled type 2 diabetes mellitus without complication, with long-term current use of insulin [E11.9, Z79.4]  Not Applicable      Resolved Hospital Problems   No resolved problems to display.          Hospital Course:  Lissett Lowry is a 73 y.o. female with past medical history significant for hypertension, diabetes mellitus type 2, hemorrhoids, history of pulmonary embolism and patient is on Xarelto, obstructive sleep apnea, patient was admitted for bright red blood per rectum.    Acute blood loss anemia  Acute lower GI bleed  Hematochezia   -Hemoglobin initially 11 on admission but came down to 6.8 on 2024 and patient received packed red blood cell transfusion.    -Patient's hemoglobin and hematocrit was monitored closely  -GI saw and evaluated the patient and followed the patient while she was in the hospital.  Patient had colonoscopy and EGD done by GI on 2024.  Colonoscopy showed clots and old blood which were removed by suction watery jets.  No fresh bleeding source was identified.  Patient had pan diverticulosis and diverticular bleeding is supposed to be the cause of GI bleed.  -EGD also was done which showed normal esophagus and mild gastritis.  Duodenum was normal.  -Postendoscopy patient's  hemoglobin hematocrit remained normal.  Patient did not have any hematochezia and was very eager to go home.     Type II DM  -Accuchecks ACHS w SSI while in the hospital  -On ozempic   -Continued home medication for discharge     Hx PE  -seems to be Provoked due to immobility after back surgery approximately 3 years ago       Dysuria  Hx frequent UTIs  -UA reviewed, not concerning for infection      Mild neurocognitive disorder  -Diagnosis made from Neurology 4/2024 based on chart review       HTN   -On bumex, norvasc, bystolic outpatient, will hold   -ECHO 07/2023: EF:61-65%     Gout  -continue febuxostat once PO tolerated      PHYLLIS  -CPAP at night      Discharge Follow Up Recommendations for outpatient labs/diagnostics:       Day of Discharge     HPI:   Resting in bed in no acute distress and quite comfortable.  Does not have any specific complaint.  No fever or chills.  No chest pain or palpitation or shortness of breath.  No nausea vomiting or abdominal pain or hematochezia.  Patient is very very eager to go home.    Review of Systems  As above    Vital Signs:          Physical Exam:  Constitutional: No acute distress, awake, alert  HENT: NCAT, mucous membranes moist  Respiratory: Clear to auscultation bilaterally, respiratory effort normal   Cardiovascular: RRR, no murmurs, rubs, or gallops  Gastrointestinal: Positive bowel sounds, soft, nontender, nondistended  Musculoskeletal: No bilateral ankle edema  Psychiatric: Appropriate affect, cooperative  Neurologic: Oriented x 3, strength symmetric in all extremities, Cranial Nerves grossly intact to confrontation, speech clear  Skin: No rashes     Pertinent  and/or Most Recent Results     LAB RESULTS:      Lab 06/20/24  0813 06/19/24  2241 06/19/24  1153 06/19/24  0650 06/19/24  0014   HEMOGLOBIN 9.7* 9.1* 7.0* 6.8* 7.1*   HEMATOCRIT 29.4* 27.0* 21.2* 20.5* 21.6*         Lab 06/20/24  0813 06/19/24  0650 06/19/24  0014   SODIUM 144 145  --    POTASSIUM 3.7 3.9 4.2    CHLORIDE 112* 116*  --    CO2 25.0 23.0  --    ANION GAP 7.0 6.0  --    BUN 12 17  --    CREATININE 0.71 0.67  --    EGFR 89.9 92.4  --    GLUCOSE 111* 102*  --    CALCIUM 9.1 8.9  --    MAGNESIUM 1.8 1.3*  --    PHOSPHORUS 2.9 2.4*  --                          Brief Urine Lab Results  (Last result in the past 365 days)        Color   Clarity   Blood   Leuk Est   Nitrite   Protein   CREAT   Urine HCG        06/16/24 2117 Yellow   Clear   Trace   Negative   Negative   Negative                 Microbiology Results (last 10 days)       ** No results found for the last 240 hours. **            NM GI Blood Loss    Result Date: 6/19/2024  DATE OF EXAM: 6/19/2024 12:31 PM EDT PROCEDURE: NM GI BLOOD LOSS INDICATIONS: Recurrent bleeding, suspected sigmoid diverticular COMPARISON: CTA of the abdomen pelvis 6/16/2024 TECHNIQUE: Scintigraphic imaging of the abdomen occurred following injection of 24.0 mCi of technetium 99m labeled pertechnetate tagged to pyrophosphate labeled red blood cells. Exam is performed per GI bleeding exam protocol. FINDINGS: CT scan report indicates sigmoid diverticulosis without evidence of diverticulitis. Recent GI bleed, new large bloody bowel movement today. Recent colonoscopy, with blood in the colon, mostly left-sided. Pandiverticulosis. Anterior blood flow images show expected activity in the aortoiliac system, both kidneys, and diffuse activity in the liver. No evidence of an arterial phase blush is identified. Static images show no evidence of an increasing, or mobile focus of activity to confirm an active GI bleeding source.     Negative GI bleeding scan. Electronically Signed: José Miguel Workman MD  6/19/2024 2:09 PM EDT  Workstation ID: [id number]    CT Angiogram Abdomen Pelvis    Result Date: 6/16/2024  CT ANGIOGRAM ABDOMEN PELVIS Date of Exam: 6/16/2024 12:59 PM EDT Indication: rectal bleeding. Comparison: 7/20/2023 Technique: CTA of the abdomen and pelvis was performed after the uneventful  intravenous administration of 100 cc Isovue-370 IV contrast . Reconstructed coronal and sagittal images were also obtained. In addition, a 3-D volume rendered image was created for interpretation. Automated exposure control and iterative reconstruction methods were used. Findings: The descending thoracic aorta is normal. The origins of the celiac axis and superior mesenteric arteries are patent. Severe narrowing of the left renal artery origin. Mild atheromatous disease of the origin of the right renal artery. Inferior mesenteric artery is patent. No aneurysm. The common iliac arteries are normal. Calcified granulomata at the lung bases. Calcified granulomata within the liver and spleen. The pancreas is normal. Previous cholecystectomy. 2.5 cm left adrenal benign adenoma. There is a filling 0.8 cm exophytic hyperdense left renal lesion consistent with a proteinaceous or hemorrhagic cyst. 1.6 cm exophytic left renal cyst. 2 cm right renal cyst. No hydroureteronephrosis. The urinary bladder is normal. The small bowel is nonobstructed. Colonic diverticulosis without evidence of diverticulitis. Appendectomy. No ascites or pneumoperitoneum. No adenopathy. Degenerative changes of the hips and lumbar spine. Prior lumbar spine fusion     Colonic diverticulosis without evidence of diverticulitis. No etiology for the patient's rectal bleeding is definitively identified. Electronically Signed: Max Sanchez MD  6/16/2024 1:27 PM EDT  Workstation ID: [id number]             Results for orders placed during the hospital encounter of 07/20/23    Adult Transesophageal Echo 3D (JENNIFER) W/ Cont If Necessary Per Protocol    Interpretation Summary    The aortic valve is abnormal in structure. There is calcification of the aortic valve. The aortic valve appears trileaflet. Trace aortic valve regurgitation is present. No aortic valve stenosis is present.    There is moderate calcification of the mitral valve posterior leaflet. No evidence of a  mitral valve mass is present. Mild mitral valve regurgitation is present. No significant mitral valve stenosis is present.    The tricuspid valve is structurally normal with no significant stenosis present. Mild tricuspid valve regurgitation is present. No TV vegetation.    The pulmonic valve is structurally normal with no significant stenosis present. There is trace pulmonic valve regurgitation present.    There is no evidence of pericardial effusion. There is a well circumscribed 2.7 x 1.6cm mass in the L AV groove consistent with epicardial fat      Plan for Follow-up of Pending Labs/Results:     Discharge Details        Discharge Medications        Continue These Medications        Instructions Start Date   acetaminophen 500 MG tablet  Commonly known as: TYLENOL   500 mg, Oral, Daily      amLODIPine 10 MG tablet  Commonly known as: NORVASC   10 mg, Oral, Every Morning      bumetanide 2 MG tablet  Commonly known as: BUMEX       dexlansoprazole 60 MG capsule  Commonly known as: DEXILANT   60 mg, Oral, Every Morning      EPINEPHrine 0.3 MG/0.3ML solution auto-injector injection  Commonly known as: EPIPEN   0.3 mg, Intramuscular, Once      ezetimibe 10 MG tablet  Commonly known as: ZETIA   10 mg, Oral, Daily      febuxostat 40 MG tablet  Commonly known as: ULORIC   40 mg, Oral, Every Morning      nebivolol 10 MG tablet  Commonly known as: BYSTOLIC   10 mg, Oral, Every Morning      oxybutynin XL 5 MG 24 hr tablet  Commonly known as: DITROPAN-XL   1 tablet, Oral, Daily      Semaglutide (1 MG/DOSE) 2 MG/1.5ML solution pen-injector  Commonly known as: OZEMPIC   1 mg, Subcutaneous, Weekly, Wednesdays      sertraline 100 MG tablet  Commonly known as: ZOLOFT   100 mg, Oral, Every Morning      VITAMIN B COMPLEX PO   1 tablet, Oral, Daily             Stop These Medications      meloxicam 7.5 MG tablet  Commonly known as: MOBIC     Rivaroxaban 2.5 MG tablet  Commonly known as: XARELTO              Allergies   Allergen  "Reactions    Lamisil [Terbinafine] Other (See Comments)     \"IT CAUSED chemically induced LUPUS\"    Duract [Bromfenac] Hives    Duricef [Cefadroxil] Hives     Has tolerated ceftriaxone, cefazolin    Erythromycin Hives    Lasix [Furosemide] Other (See Comments)     PT STATES \"IM JUST VERY SENSITIVE TO IT.\"    Mevacor [Lovastatin] Other (See Comments)     PT REPORTS \"IT JUST MADE ME FEEL SICK, I COULDN'T TOLERATE IT NOTHING SPECIFIC.\"    Sulfa Antibiotics Hives    Zocor [Simvastatin] Other (See Comments)     \"IT JUST MADE ME FEEL SICK, I COULDN'T TOLERATE IT. NOTHING SPECIFIC.\"           Discharge Disposition:  Home or Self Care    Diet:  Hospital:No active diet order           Activity:  Activity Instructions       Activity as Tolerated              Restrictions or Other Recommendations:         CODE STATUS:    Code Status and Medical Interventions:   Ordered at: 06/16/24 8285     Level Of Support Discussed With:    Patient     Code Status (Patient has no pulse and is not breathing):    No CPR (Do Not Attempt to Resuscitate)     Medical Interventions (Patient has pulse or is breathing):    Full Support       Future Appointments   Date Time Provider Department Center   9/12/2024  9:45 AM Nikko Arguello DO [facility]       Additional Instructions for the Follow-ups that You Need to Schedule       Discharge Follow-up with PCP   As directed       Currently Documented PCP:    Konstantin Gibson MD    PCP Phone Number:    [phone]     Follow Up Details: within one week                      González Lopez MD  06/25/24      Time Spent on Discharge:  I spent  38  minutes on this discharge activity which included: face-to-face encounter with the patient, reviewing the data in the system, coordination of the care with the nursing staff as well as consultants, documentation, and entering orders.         "

## 2024-06-26 ENCOUNTER — READMISSION MANAGEMENT (OUTPATIENT)
Dept: CALL CENTER | Facility: HOSPITAL | Age: 74
End: 2024-06-26
Payer: MEDICARE

## 2024-06-26 NOTE — OUTREACH NOTE
Medical Week 1 Survey      Flowsheet Row Responses   [facility] ([facility] patient discharged from? [facility]   Does the patient have one of the following disease processes/diagnoses(primary or secondary)? Other   Week 1 attempt successful? Yes   Call start time 1239   Call end time 1247   Discharge diagnosis GI bleed colonoscopy this visit   Meds reviewed with patient/caregiver? Yes   Is the patient having any side effects they believe may be caused by any medication additions or changes? No   Does the patient have all medications ordered at discharge? Yes   Is the patient taking all medications as directed (includes completed medication regime)? Yes   Does the patient have a primary care provider?  Yes   Does the patient have an appointment with their PCP within 7 days of discharge? Yes   Has the patient kept scheduled appointments due by today? Yes   What DME was ordered? CPAP at night for PHYLLIS   Comments Glucose this morning 89 which is good for her, pt reports. Pt denies further blood in stool and reports that her appetite is improving since DC. Pt reports ongoing fatigue, requiring freq rests during tasks but denies dizziness or SOA.   Did the patient receive a copy of their discharge instructions? Yes   Nursing interventions Reviewed instructions with patient   What is the patient's perception of their health status since discharge? Improving   Is the patient/caregiver able to teach back signs and symptoms related to disease process for when to call PCP? Yes   Is the patient/caregiver able to teach back signs and symptoms related to disease process for when to call 911? Yes   Is the patient/caregiver able to teach back the hierarchy of who to call/visit for symptoms/problems? PCP, Specialist, Home health nurse, Urgent Care, ED, 911 Yes   Week 1 call completed? Yes   Revoked No further contact(revokes)-requires comment   Call end time 1247            Khadijah HENRY - Registered Nurse

## 2024-07-21 LAB
QT INTERVAL: 446 MS
QTC INTERVAL: 463 MS

## 2024-09-05 PROBLEM — M19.90 OSTEOARTHRITIS: Status: ACTIVE | Noted: 2024-09-05

## 2024-09-05 PROBLEM — M79.7 FIBROMYALGIA: Status: ACTIVE | Noted: 2024-09-05

## 2024-11-26 ENCOUNTER — OFFICE VISIT (OUTPATIENT)
Dept: CARDIOLOGY | Facility: CLINIC | Age: 74
End: 2024-11-26
Payer: MEDICARE

## 2024-11-26 VITALS
HEART RATE: 69 BPM | HEIGHT: 66 IN | WEIGHT: 185 LBS | DIASTOLIC BLOOD PRESSURE: 66 MMHG | SYSTOLIC BLOOD PRESSURE: 126 MMHG | BODY MASS INDEX: 29.73 KG/M2 | OXYGEN SATURATION: 95 %

## 2024-11-26 DIAGNOSIS — G47.33 OSA ON CPAP: Primary | ICD-10-CM

## 2024-11-26 PROCEDURE — 3074F SYST BP LT 130 MM HG: CPT | Performed by: NURSE PRACTITIONER

## 2024-11-26 PROCEDURE — 1159F MED LIST DOCD IN RCRD: CPT | Performed by: NURSE PRACTITIONER

## 2024-11-26 PROCEDURE — 3078F DIAST BP <80 MM HG: CPT | Performed by: NURSE PRACTITIONER

## 2024-11-26 PROCEDURE — 1160F RVW MEDS BY RX/DR IN RCRD: CPT | Performed by: NURSE PRACTITIONER

## 2024-11-26 PROCEDURE — 99213 OFFICE O/P EST LOW 20 MIN: CPT | Performed by: NURSE PRACTITIONER

## 2024-11-26 RX ORDER — CARBIDOPA/LEVODOPA 10MG-100MG
TABLET ORAL
COMMUNITY
Start: 2024-09-03

## 2024-11-26 NOTE — PROGRESS NOTES
Follow-Up Sleep Consult     Date:   2024  Name: Lissett Lowry  :   1950  PCP: Konstantin Gibson MD    Chief Complaint   Patient presents with    Sleep Apnea       Subjective     History of Present Illness  Lissett Lowry is a 74 y.o. female who presents today for follow-up on PHYLLIS.  She comes in today for a 1 year follow-up on her known history of very severe sleep apnea on CPAP therapy.    She was last seen about 1 year ago when she was given an order for a pressure adjustment.  Unfortunately her pressures were not adjusted at that time.    She comes back in today for annual follow-up for known history of very severe sleep apnea.  Her weight is down about 115 pounds since her last titration study and she wonders if she needs her pressures lowered or adjusted.    She reports she loves her CPAP device.  She has been a faithful user of her device and she reports that her current device is greater than 5 years old and she wishes to have it replaced.  She reports she cannot sleep without her PAP device.    Sleep and cardiac history:    Mild CAD LHC 2013    Mild LVH and EF WNL echo 2013    Minimal PAD 2015 with minimal SF a bilateral plaque on lower extremity arterial duplex    PHYLLIS baseline AHI 91 on 10/16/2009    Titration study 2013 titrated to CPAP 10 cm. Last titration study 2020 titrated to CPAP 13 cm    Current PHYLLIS therapy auto CPAP 12 to 13 cm     PE/DVT was on Xarelto and had GIB   -off xarelto       Current mask used is fullface mask    Device Functioning Well: Yes but noted her PAP device is greater than 5 years old.  She wishes to have it replaced.  Mask Fit Comfortable: Yes  Air Flow Comfortable: Yes  DME Helpful for Supplies: Yes helpful for supplies but pressure adjustment did not get completed on last order.  Sleep is rested: Yes    Device Download:                 The patient's relevant past medical, surgical, family, and social history reviewed and  updated in Epic as appropriate.    Past Medical History:   Diagnosis Date    Anemia     Chronic kidney disease, stage 3     Chronic pain in left shoulder     Depression     Diabetes mellitus     Disease of thyroid gland     as a child- underactive     Drug-induced lupus erythematosus     due to lamisil    Elevated cholesterol     Elevated lipids     Fibromyalgia     Gout     Heart disease     Heart valve disease     sees Dr. Leahy at [facility]    History of Clostridium difficile infection     History of kidney stones     Hyperlipidemia     Hypertension     IBS (irritable bowel syndrome)     Incisional hernia with bowel obstruction 07/2023    Kidney stone     Obesity     PHYLLIS on CPAP     CPAP compliant, pressure setting 10    Osteoarthritis     Osteoarthritis of right elbow     Osteoporosis     Primary osteoarthritis of left knee     Pulmonary embolism     Renal disease     Rotator cuff tear     Sjogren's syndrome     SLE (systemic lupus erythematosus)     in remission    Sleep apnea     SOB (shortness of breath)     Staphylococcal infection 07/2023    completed 1 month of abx infusions    Wears glasses      Past Surgical History:   Procedure Laterality Date    APPENDECTOMY      CARDIAC CATHETERIZATION      no stents    CATARACT EXTRACTION      bilateral    CHOLECYSTECTOMY      COLONOSCOPY  2017    COLONOSCOPY N/A 06/18/2024    Procedure: COLONOSCOPY;  Surgeon: Brunner, Mark I, MD;  Location:  [facility];  Service: Gastroenterology;  Laterality: N/A;    ENDOSCOPY      ENDOSCOPY N/A 06/18/2024    Procedure: ESOPHAGOGASTRODUODENOSCOPY;  Surgeon: Brunner, Mark I, MD;  Location:  [facility];  Service: Gastroenterology;  Laterality: N/A;    HYSTERECTOMY      vaginal    LUMBAR FUSION  2022    OTHER SURGICAL HISTORY      Vitrectomy    TOTAL KNEE ARTHROPLASTY Left 08/26/2019    Procedure: TOTAL KNEE ARTHROPLASTY LEFT;  Surgeon: Rony Rojas MD;  Location:  [facility];  Service: Orthopedics    VENTRAL/INCISIONAL  "HERNIA REPAIR N/A 08/29/2023    Procedure: REPAIR OF INCISIONAL AND UMBILICAL HERNIA REPAIR WITH MESH;  Surgeon: Royal Tineo MD;  Location: [facility];  Service: General;  Laterality: N/A;     OB History    No obstetric history on file.       Allergies   Allergen Reactions    Lamisil [Terbinafine] Other (See Comments)     \"IT CAUSED chemically induced LUPUS\"    Duract [Bromfenac] Hives    Duricef [Cefadroxil] Hives     Has tolerated ceftriaxone, cefazolin    Erythromycin Hives    Lasix [Furosemide] Other (See Comments)     PT STATES \"IM JUST VERY SENSITIVE TO IT.\"    Mevacor [Lovastatin] Other (See Comments)     PT REPORTS \"IT JUST MADE ME FEEL SICK, I COULDN'T TOLERATE IT NOTHING SPECIFIC.\"    Sulfa Antibiotics Hives    Zocor [Simvastatin] Other (See Comments)     \"IT JUST MADE ME FEEL SICK, I COULDN'T TOLERATE IT. NOTHING SPECIFIC.\"       Prior to Admission medications    Medication Sig Start Date End Date Taking? Authorizing Provider   acetaminophen (TYLENOL) 500 MG tablet Take 1 tablet by mouth Daily.   Yes ProviderRadha MD   amLODIPine (NORVASC) 10 MG tablet Take 1 tablet by mouth Every Morning. Indications: HTN   Yes ProviderRadha MD   bumetanide (BUMEX) 2 MG tablet  8/26/23  Yes ProviderRadha MD   carbidopa-levodopa (SINEMET)  MG per tablet  9/3/24  Yes Radha Maxwell MD   dexlansoprazole (DEXILANT) 60 MG capsule Take 1 capsule by mouth Every Morning. Indications: Gastroesophageal Reflux Disease, Heartburn 7/26/23  Yes Konstantin Gibson MD   diphenoxylate-atropine (LOMOTIL) 2.5-0.025 MG per tablet Take 3 tablets by mouth Daily As Needed for Diarrhea.   Yes ProviderRadha MD   EPINEPHrine (EPIPEN) 0.3 MG/0.3ML solution auto-injector injection Inject 0.3 mL into the appropriate muscle as directed by prescriber 1 (One) Time. 7/25/23  Yes Radha Maxwell MD   ezetimibe (ZETIA) 10 MG tablet Take 1 tablet by mouth Daily. Indications: High Amount of Fats in " the Blood 7/26/23  Yes Radha Maxwell MD   febuxostat (ULORIC) 40 MG tablet Take 1 tablet by mouth Every Morning. Indications: Gout   Yes Radha Maxwell MD   nebivolol (BYSTOLIC) 10 MG tablet Take 1 tablet by mouth Every Morning. Indications: High Blood Pressure   Yes Radha Maxwell MD   oxybutynin XL (DITROPAN-XL) 5 MG 24 hr tablet Take 1 tablet by mouth Daily. Indications: Urinary Incontinence   Yes Radha Maxwell MD   pregabalin (LYRICA) 100 MG capsule Take 1 capsule by mouth 2 (Two) Times a Day.   Yes Radha Maxwell MD   Rivaroxaban (Xarelto) 2.5 MG tablet Take 1 tablet by mouth 2 (Two) Times a Day.   Yes Radha Maxwell MD   rosuvastatin (CRESTOR) 5 MG tablet Take 1 tablet by mouth Daily.   Yes Radha Maxwell MD   Semaglutide, 1 MG/DOSE, (OZEMPIC) 2 MG/1.5ML solution pen-injector Inject 1 mg under the skin into the appropriate area as directed 1 (One) Time Per Week. Wednesdays  Indications: Type 2 Diabetes 7/26/23  Yes Radha Maxwell MD   sertraline (ZOLOFT) 100 MG tablet Take 1 tablet by mouth Every Morning. Indications: Major Depressive Disorder   Yes Radha Maxwell MD   vitamin B-12 (CYANOCOBALAMIN) 1000 MCG tablet Take 1 tablet by mouth Daily.   Yes Radha Maxwell MD   vitamin D3 125 MCG (5000 UT) capsule capsule Take 1 capsule by mouth Daily.   Yes Radha Maxwell MD   acetaminophen (TYLENOL) 650 MG 8 hr tablet Take 2 tablets by mouth Every 8 (Eight) Hours. 2 times daily  11/26/24  Radha Maxwell MD   ascorbic acid (VITAMIN C) 1000 MG tablet Take 1 tablet by mouth Daily.  11/26/24  Radha Maxwell MD   B Complex Vitamins (VITAMIN B COMPLEX PO) Take 1 tablet by mouth Daily.  11/26/24  Radha Maxwell MD   Lansoprazole (PREVACID PO) Take 1 capsule by mouth Daily. Before a meal  11/26/24  Radha Maxwell MD   pioglitazone (ACTOS) 45 MG tablet Take 1 tablet by mouth Daily.  11/26/24  Radha Maxwell MD  "    Family History   Problem Relation Age of Onset    Stroke Mother     Heart disease Mother     Hypertension Mother     Heart attack Mother     Deep vein thrombosis Mother     Osteoarthritis Mother     Osteoporosis Mother     Stroke Father     Heart disease Father     Hypertension Father     Heart attack Father     Cancer Other     Diabetes Other     Heart disease Other     Hypertension Other     Heart disease Other     Hypertension Other     Heart attack Other        Objective     Vital Signs:  /66 (BP Location: Right arm, Patient Position: Sitting, Cuff Size: Adult)   Pulse 69   Ht 167.6 cm (66\")   Wt 83.9 kg (185 lb)   SpO2 95%   BMI 29.86 kg/m²     BMI is >= 25 and <30. (Overweight) The following options were offered after discussion;: referral to primary care        Physical Exam  HENT:      Head: Normocephalic.      Nose: Nose normal.      Mouth/Throat:      Mouth: Mucous membranes are moist.   Pulmonary:      Effort: Pulmonary effort is normal.   Skin:     General: Skin is warm and dry.   Neurological:      Mental Status: She is alert and oriented to person, place, and time.   Psychiatric:         Mood and Affect: Mood normal.         Behavior: Behavior normal.         Thought Content: Thought content normal.         The following data was reviewed by: DEBORAH Turk on 11/26/2024:    PAP download reviewed: 30-day download as above.  I have reviewed and interpreted the data on the download at today's visit         Assessment and Plan     Diagnoses and all orders for this visit:    1. PHYLLIS on CPAP (Primary)  Assessment & Plan:  Baseline AHI is 91.  This is very severe sleep apnea.    At her last visit we ordered a lower pressure adjustment but it did not get completed.    She is on CPAP therapy.  Download reviewed with good control and good compliance.    She is benefiting from CPAP therapy.  We plan to continue CPAP therapy.    The patient reports she loves her CPAP but she does wonder " if she needs her pressures adjusted as on her last titration study in 2020 she weighed 300 pounds.  Her weight is down to 185.  So about 115 pound weight loss since her last titration.    Also noted that her PAP device is greater than 5 years old.    Plan:    Prescription to DME of patient's choice for new auto CPAP with slightly lower pressures of 8 to 12 cm and CPAP supplies.    Follow-up when on new CPAP more than 1 month but less than 3 months.    Follow-up sooner for any PHYLLIS, pressure or PAP concerns.    Consideration is given for an in lab study but patient would prefer to hold off at this time.    Orders:  -     PAP Therapy        Report if any new/changing symptoms immediately         Follow Up  Return in about 2 months (around 2/9/2025) for PHYLLIS/ 31-90 days new cpap .  Patient was given instructions and counseling regarding her condition or for health maintenance advice. Please see specific information pulled into the AVS if appropriate.

## 2024-11-26 NOTE — ASSESSMENT & PLAN NOTE
Baseline AHI is 91.  This is very severe sleep apnea.    At her last visit we ordered a lower pressure adjustment but it did not get completed.    She is on CPAP therapy.  Download reviewed with good control and good compliance.    She is benefiting from CPAP therapy.  We plan to continue CPAP therapy.    The patient reports she loves her CPAP but she does wonder if she needs her pressures adjusted as on her last titration study in 2020 she weighed 300 pounds.  Her weight is down to 185.  So about 115 pound weight loss since her last titration.    Also noted that her PAP device is greater than 5 years old.    Plan:    Prescription to DME of patient's choice for new auto CPAP with slightly lower pressures of 8 to 12 cm and CPAP supplies.    Follow-up when on new CPAP more than 1 month but less than 3 months.    Follow-up sooner for any PHYLLIS, pressure or PAP concerns.    Consideration is given for an in lab study but patient would prefer to hold off at this time.

## 2025-01-08 ENCOUNTER — TELEPHONE (OUTPATIENT)
Dept: CARDIOLOGY | Facility: CLINIC | Age: 75
End: 2025-01-08
Payer: MEDICARE

## 2025-02-05 ENCOUNTER — TELEPHONE (OUTPATIENT)
Dept: CARDIOLOGY | Facility: CLINIC | Age: 75
End: 2025-02-05

## 2025-02-05 NOTE — TELEPHONE ENCOUNTER
Caller: Lissett Lowry    Relationship: Self    Best call back number: [phone]     What is the best time to reach you: ANY     What was the call regarding: PT IS WANTING TO GET HER APPT RESCHD ON 3/5/25 DUE TO GETTING NEW CPAP ON 2/27 OR 2/28. IS ASKING THAT WE PUSH APPT OUT UNTIL Monday Mar 24, 2025 JUST MAKING SURE THIS IS WITHIN COMPLIANCE     Is it okay if the provider responds through MyChart: CALL

## 2025-04-08 ENCOUNTER — OFFICE VISIT (OUTPATIENT)
Dept: CARDIOLOGY | Facility: CLINIC | Age: 75
End: 2025-04-08
Payer: MEDICARE

## 2025-04-08 VITALS
OXYGEN SATURATION: 95 % | WEIGHT: 188 LBS | HEART RATE: 76 BPM | BODY MASS INDEX: 30.22 KG/M2 | HEIGHT: 66 IN | DIASTOLIC BLOOD PRESSURE: 74 MMHG | SYSTOLIC BLOOD PRESSURE: 130 MMHG

## 2025-04-08 DIAGNOSIS — G47.33 OSA ON CPAP: Primary | ICD-10-CM

## 2025-04-08 PROCEDURE — 99213 OFFICE O/P EST LOW 20 MIN: CPT | Performed by: NURSE PRACTITIONER

## 2025-04-08 PROCEDURE — 1160F RVW MEDS BY RX/DR IN RCRD: CPT | Performed by: NURSE PRACTITIONER

## 2025-04-08 PROCEDURE — 3075F SYST BP GE 130 - 139MM HG: CPT | Performed by: NURSE PRACTITIONER

## 2025-04-08 PROCEDURE — 3078F DIAST BP <80 MM HG: CPT | Performed by: NURSE PRACTITIONER

## 2025-04-08 PROCEDURE — 1159F MED LIST DOCD IN RCRD: CPT | Performed by: NURSE PRACTITIONER

## 2025-04-08 RX ORDER — CARBIDOPA AND LEVODOPA 25; 100 MG/1; MG/1
1 TABLET ORAL DAILY
COMMUNITY
Start: 2025-03-24

## 2025-04-08 NOTE — PROGRESS NOTES
Follow-Up Sleep Consult     Date:   2025  Name: Lissett Lowry  :   1950  PCP: Konstantin Gibson MD    Chief Complaint   Patient presents with    Sleep Apnea     Compliance 31-90 day       Subjective     History of Present Illness  Lissett Lowry is a 74 y.o. female who presents today for follow-up on PHYLLIS.  She comes in today for follow-up on her new CPAP.  She has had her new CPAP for more than 1 month but less than 3 months.    She reports she is very satisfied with her new CPAP.  She does report that her mask fit is not entirely comfortable.  She wonders if she could try something different.    Sleep and cardiac history:    Mild CAD LHC 2013    Mild LVH and EF WNL echo 2013    Minimal PAD 2015 with minimal SF a bilateral plaque on lower extremity arterial duplex    PHYLLIS baseline AHI 91 on 10/16/2009    Titration study 2013 titrated to CPAP 10 cm. Last titration study 2020 titrated to CPAP 13 cm    Current PHYLLIS therapy auto CPAP 8-12 cm     PE/DVT was on Xarelto and had GIB   -off xarelto       Current mask used is full facemask    Device Functioning Well: Yes  Mask Fit Comfortable: No, Air Leak , Wrong Size , and moves upward during use and she feels like she is constantly readjusting the mask.  This is the mask style she has used for many years.  We discussed that there are many new mask styles including the minimalist styles and she is welcome to try a new mask style.  Air Flow Comfortable: Yes  DME Helpful for Supplies: Yes  Sleep is rested: Yes    Device Download:                         The patient's relevant past medical, surgical, family, and social history reviewed and updated in Epic as appropriate.    Past Medical History:   Diagnosis Date    Anemia     Chronic kidney disease, stage 3     Chronic pain in left shoulder     Depression     Diabetes mellitus     Disease of thyroid gland     as a child- underactive     Drug-induced lupus erythematosus      due to lamisil    Elevated cholesterol     Elevated lipids     Fibromyalgia     Gout     Heart disease     Heart valve disease     sees Dr. Leahy at [facility]    History of Clostridium difficile infection     History of kidney stones     Hyperlipidemia     Hypertension     IBS (irritable bowel syndrome)     Incisional hernia with bowel obstruction 07/2023    Kidney stone     Obesity     PHYLLIS on CPAP     CPAP compliant, pressure setting 10    Osteoarthritis     Osteoarthritis of right elbow     Osteoporosis     Primary osteoarthritis of left knee     Pulmonary embolism     Renal disease     Rotator cuff tear     Sjogren's syndrome     SLE (systemic lupus erythematosus)     in remission    Sleep apnea     SOB (shortness of breath)     Staphylococcal infection 07/2023    completed 1 month of abx infusions    Wears glasses      Past Surgical History:   Procedure Laterality Date    APPENDECTOMY      CARDIAC CATHETERIZATION      no stents    CATARACT EXTRACTION      bilateral    CHOLECYSTECTOMY      COLONOSCOPY  2017    COLONOSCOPY N/A 06/18/2024    Procedure: COLONOSCOPY;  Surgeon: Brunner, Mark I, MD;  Location:  [facility];  Service: Gastroenterology;  Laterality: N/A;    ENDOSCOPY      ENDOSCOPY N/A 06/18/2024    Procedure: ESOPHAGOGASTRODUODENOSCOPY;  Surgeon: Brunner, Mark I, MD;  Location:  [facility];  Service: Gastroenterology;  Laterality: N/A;    HYSTERECTOMY      vaginal    LUMBAR FUSION  2022    OTHER SURGICAL HISTORY      Vitrectomy    TOTAL KNEE ARTHROPLASTY Left 08/26/2019    Procedure: TOTAL KNEE ARTHROPLASTY LEFT;  Surgeon: Rony Rojas MD;  Location:  [facility];  Service: Orthopedics    VENTRAL/INCISIONAL HERNIA REPAIR N/A 08/29/2023    Procedure: REPAIR OF INCISIONAL AND UMBILICAL HERNIA REPAIR WITH MESH;  Surgeon: Royal Tineo MD;  Location:  [facility];  Service: General;  Laterality: N/A;     OB History    No obstetric history on file.       Allergies   Allergen Reactions     "Lamisil [Terbinafine] Other (See Comments)     \"IT CAUSED chemically induced LUPUS\"    Duract [Bromfenac] Hives    Duricef [Cefadroxil] Hives     Has tolerated ceftriaxone, cefazolin    Erythromycin Hives    Lasix [Furosemide] Other (See Comments)     PT STATES \"IM JUST VERY SENSITIVE TO IT.\"    Mevacor [Lovastatin] Other (See Comments)     PT REPORTS \"IT JUST MADE ME FEEL SICK, I COULDN'T TOLERATE IT NOTHING SPECIFIC.\"    Sulfa Antibiotics Hives    Zocor [Simvastatin] Other (See Comments)     \"IT JUST MADE ME FEEL SICK, I COULDN'T TOLERATE IT. NOTHING SPECIFIC.\"       Prior to Admission medications    Medication Sig Start Date End Date Taking? Authorizing Provider   acetaminophen (TYLENOL) 500 MG tablet Take 1 tablet by mouth Daily.   Yes ProviderRadha MD   amLODIPine (NORVASC) 10 MG tablet Take 1 tablet by mouth Every Morning. Indications: HTN   Yes Radha Maxwell MD   bumetanide (BUMEX) 2 MG tablet  8/26/23  Yes ProviderRadha MD   carbidopa-levodopa (SINEMET)  MG per tablet Take 1 tablet by mouth Daily. 3/24/25  Yes Radha Maxwell MD   dexlansoprazole (DEXILANT) 60 MG capsule Take 1 capsule by mouth Every Morning. Indications: Gastroesophageal Reflux Disease, Heartburn 7/26/23  Yes Konstantin Gibson MD   diphenoxylate-atropine (LOMOTIL) 2.5-0.025 MG per tablet Take 3 tablets by mouth Daily As Needed for Diarrhea.   Yes ProviderRadha MD   EPINEPHrine (EPIPEN) 0.3 MG/0.3ML solution auto-injector injection Inject 0.3 mL into the appropriate muscle as directed by prescriber 1 (One) Time. 7/25/23  Yes Radha Maxwell MD   ezetimibe (ZETIA) 10 MG tablet Take 1 tablet by mouth Daily. Indications: High Amount of Fats in the Blood 7/26/23  Yes Radha Maxwell MD   febuxostat (ULORIC) 40 MG tablet Take 1 tablet by mouth Every Morning. Indications: Gout   Yes Radha Maxwell MD   nebivolol (BYSTOLIC) 10 MG tablet Take 1 tablet by mouth Every Morning. Indications: " "High Blood Pressure   Yes Radha Maxwell MD   oxybutynin XL (DITROPAN-XL) 5 MG 24 hr tablet Take 1 tablet by mouth Daily. Indications: Urinary Incontinence   Yes Radha Maxwell MD   pregabalin (LYRICA) 100 MG capsule Take 1 capsule by mouth 2 (Two) Times a Day.   Yes Radha Maxwell MD   rosuvastatin (CRESTOR) 5 MG tablet Take 1 tablet by mouth Daily.   Yes Radha Maxwell MD   Semaglutide, 1 MG/DOSE, (OZEMPIC) 2 MG/1.5ML solution pen-injector Inject 1 mg under the skin into the appropriate area as directed 1 (One) Time Per Week. Wednesdays  Indications: Type 2 Diabetes 7/26/23  Yes Radha Maxwell MD   sertraline (ZOLOFT) 100 MG tablet Take 1 tablet by mouth Every Morning. Indications: Major Depressive Disorder   Yes Radha Maxwell MD   vitamin B-12 (CYANOCOBALAMIN) 1000 MCG tablet Take 1 tablet by mouth Daily.   Yes Radha Maxwell MD   vitamin D3 125 MCG (5000 UT) capsule capsule Take 1 capsule by mouth Daily.   Yes Radha Maxwell MD   carbidopa-levodopa (SINEMET)  MG per tablet  9/3/24 4/8/25  Radha Maxwell MD     Family History   Problem Relation Age of Onset    Stroke Mother     Heart disease Mother     Hypertension Mother     Heart attack Mother     Deep vein thrombosis Mother     Osteoarthritis Mother     Osteoporosis Mother     Stroke Father     Heart disease Father     Hypertension Father     Heart attack Father     Cancer Other     Diabetes Other     Heart disease Other     Hypertension Other     Heart disease Other     Hypertension Other     Heart attack Other        Objective     Vital Signs:  /74   Pulse 76   Ht 167.6 cm (66\")   Wt 85.3 kg (188 lb)   SpO2 95%   BMI 30.34 kg/m²              Physical Exam  HENT:      Head: Normocephalic.      Nose: Nose normal.      Mouth/Throat:      Mouth: Mucous membranes are moist.   Pulmonary:      Effort: Pulmonary effort is normal.   Skin:     General: Skin is warm and dry. "   Neurological:      Mental Status: She is alert and oriented to person, place, and time.   Psychiatric:         Mood and Affect: Mood normal.         Behavior: Behavior normal.         Thought Content: Thought content normal.         The following data was reviewed by: DEBORAH Turk on 04/08/2025:    PAP download reviewed: 30-day download as above.  I have reviewed and interpreted the data on the download at today's visit         Assessment and Plan     Diagnoses and all orders for this visit:    1. PHYLLIS on CPAP (Primary)  Assessment & Plan:  She has a known history of very severe sleep apnea.  Baseline AHI is 91.    She is on her new CPAP.    She is on new CPAP more than 1 month but less than 3 months.    CPAP download is reviewed showing good control and good compliance.    She is benefiting from PAP therapy.    We plan to continue PAP therapy.    Prescription to DME of patient's choice for a CPAP mask fitting and for CPAP supplies.    Follow-up for severe PHYLLIS on CPAP therapy in 1 year or sooner for any PHYLLIS or PAP concerns.    Orders:  -     PAP Therapy        Report if any new/changing symptoms immediately         Follow Up  Return in about 1 year (around 4/8/2026) for PHYLLIS.  Patient was given instructions and counseling regarding her condition or for health maintenance advice. Please see specific information pulled into the AVS if appropriate.

## 2025-04-08 NOTE — ASSESSMENT & PLAN NOTE
She has a known history of very severe sleep apnea.  Baseline AHI is 91.    She is on her new CPAP.    She is on new CPAP more than 1 month but less than 3 months.    CPAP download is reviewed showing good control and good compliance.    She is benefiting from PAP therapy.    We plan to continue PAP therapy.    Prescription to DME of patient's choice for a CPAP mask fitting and for CPAP supplies.    Follow-up for severe PHYLLIS on CPAP therapy in 1 year or sooner for any PHYLLIS or PAP concerns.

## 2025-04-10 ENCOUNTER — PATIENT ROUNDING (BHMG ONLY) (OUTPATIENT)
Dept: CARDIOLOGY | Facility: CLINIC | Age: 75
End: 2025-04-10
Payer: MEDICARE

## 2025-04-10 NOTE — PROGRESS NOTES
..My name is Carlita Pichardo and I am the Practice Manager for [facility].    I would like to thank you for being a loyal patient. If you do not mind I would like to ask you a few questions about your recent visit with us.  Please feel free to reply if you wish to provide us with feedback on your first visit with our practice.    First, could you tell me what went well with your recent visit?    Secondly, we are always looking for ways to make our patients' experiences even better.  Do you have any recommendations on ways we may improve?    Finally, overall were you satisfied with your first visit to us as a [facility] facility?    In the next few days, you will be receiving a Patient Experience [email or web address]      Thank you for taking the time to answer a few questions today.  I hope you have a good day.

## 2025-08-06 ENCOUNTER — OFFICE VISIT (OUTPATIENT)
Age: 75
End: 2025-08-06
Payer: MEDICARE

## 2025-08-06 VITALS
TEMPERATURE: 98.1 F | HEART RATE: 66 BPM | HEIGHT: 66 IN | BODY MASS INDEX: 28.22 KG/M2 | SYSTOLIC BLOOD PRESSURE: 130 MMHG | WEIGHT: 175.6 LBS | DIASTOLIC BLOOD PRESSURE: 78 MMHG

## 2025-08-06 DIAGNOSIS — M79.7 FIBROMYALGIA: Chronic | ICD-10-CM

## 2025-08-06 DIAGNOSIS — M1A.00X0 IDIOPATHIC CHRONIC GOUT WITHOUT TOPHUS, UNSPECIFIED SITE: Primary | Chronic | ICD-10-CM

## 2025-08-06 DIAGNOSIS — M35.00 SJOGREN SYNDROME, UNSPECIFIED: Chronic | ICD-10-CM

## 2025-08-06 DIAGNOSIS — M15.0 PRIMARY OSTEOARTHRITIS INVOLVING MULTIPLE JOINTS: Chronic | ICD-10-CM

## 2025-08-06 RX ORDER — RIVASTIGMINE 9.5 MG/24H
PATCH, EXTENDED RELEASE TRANSDERMAL
COMMUNITY
Start: 2025-08-05

## 2025-08-06 RX ORDER — CALCIUM CITRATE/VITAMIN D3 200MG-6.25
TABLET ORAL
COMMUNITY
Start: 2025-04-12

## 2025-08-07 ENCOUNTER — RESULTS FOLLOW-UP (OUTPATIENT)
Age: 75
End: 2025-08-07
Payer: MEDICARE

## 2025-08-07 LAB
ALBUMIN SERPL-MCNC: 4.1 G/DL (ref 3.8–4.8)
ALP SERPL-CCNC: 63 IU/L (ref 44–121)
ALT SERPL-CCNC: 18 IU/L (ref 0–32)
AST SERPL-CCNC: 20 IU/L (ref 0–40)
BASOPHILS # BLD AUTO: 0 X10E3/UL (ref 0–0.2)
BASOPHILS NFR BLD AUTO: 1 %
BILIRUB SERPL-MCNC: 0.4 MG/DL (ref 0–1.2)
BUN SERPL-MCNC: 17 MG/DL (ref 8–27)
BUN/CREAT SERPL: 19 (ref 12–28)
CALCIUM SERPL-MCNC: 10.4 MG/DL (ref 8.7–10.3)
CHLORIDE SERPL-SCNC: 101 MMOL/L (ref 96–106)
CK SERPL-CCNC: 26 U/L (ref 32–182)
CO2 SERPL-SCNC: 28 MMOL/L (ref 20–29)
CREAT SERPL-MCNC: 0.91 MG/DL (ref 0.57–1)
CRP SERPL-MCNC: <1 MG/L (ref 0–10)
EGFRCR SERPLBLD CKD-EPI 2021: 66 ML/MIN/1.73
EOSINOPHIL # BLD AUTO: 0 X10E3/UL (ref 0–0.4)
EOSINOPHIL NFR BLD AUTO: 0 %
ERYTHROCYTE [DISTWIDTH] IN BLOOD BY AUTOMATED COUNT: 14 % (ref 11.7–15.4)
ERYTHROCYTE [SEDIMENTATION RATE] IN BLOOD BY WESTERGREN METHOD: 20 MM/HR (ref 0–40)
GLOBULIN SER CALC-MCNC: 2.5 G/DL (ref 1.5–4.5)
GLUCOSE SERPL-MCNC: 101 MG/DL (ref 70–99)
HCT VFR BLD AUTO: 44.3 % (ref 34–46.6)
HGB BLD-MCNC: 13.5 G/DL (ref 11.1–15.9)
IMM GRANULOCYTES # BLD AUTO: 0 X10E3/UL (ref 0–0.1)
IMM GRANULOCYTES NFR BLD AUTO: 0 %
LYMPHOCYTES # BLD AUTO: 1.1 X10E3/UL (ref 0.7–3.1)
LYMPHOCYTES NFR BLD AUTO: 12 %
MCH RBC QN AUTO: 28.5 PG (ref 26.6–33)
MCHC RBC AUTO-ENTMCNC: 30.5 G/DL (ref 31.5–35.7)
MCV RBC AUTO: 94 FL (ref 79–97)
MONOCYTES # BLD AUTO: 0.5 X10E3/UL (ref 0.1–0.9)
MONOCYTES NFR BLD AUTO: 5 %
NEUTROPHILS # BLD AUTO: 7.2 X10E3/UL (ref 1.4–7)
NEUTROPHILS NFR BLD AUTO: 82 %
PLATELET # BLD AUTO: 253 X10E3/UL (ref 150–450)
POTASSIUM SERPL-SCNC: 4.9 MMOL/L (ref 3.5–5.2)
PROT SERPL-MCNC: 6.6 G/DL (ref 6–8.5)
RBC # BLD AUTO: 4.74 X10E6/UL (ref 3.77–5.28)
SODIUM SERPL-SCNC: 143 MMOL/L (ref 134–144)
UNABLE TO VOID: NORMAL
URATE SERPL-MCNC: 5.6 MG/DL (ref 3.1–7.9)
WBC # BLD AUTO: 8.8 X10E3/UL (ref 3.4–10.8)

## (undated) DEVICE — JACKSON-PRATT 100CC BULB RESERVOIR: Brand: CARDINAL HEALTH

## (undated) DEVICE — BNDG ELAS ELITE V/CLOSE 6IN 5YD LF STRL

## (undated) DEVICE — GLV SURG PREMIERPRO MIC LTX PF SZ8 BRN

## (undated) DEVICE — UNDERCAST PADDING: Brand: DEROYAL

## (undated) DEVICE — TUBING, SUCTION, 1/4" X 10', STRAIGHT: Brand: MEDLINE

## (undated) DEVICE — SUT MNCRYL PLS ANTIB UD 4/0 PS2 18IN

## (undated) DEVICE — SAFESECURE,SECUREMENT,FOLEY CATH,STERILE: Brand: MEDLINE

## (undated) DEVICE — SAFELINER SUCTION CANISTER 1000CC: Brand: DEROYAL

## (undated) DEVICE — INTRO ACCSR BLNT TP

## (undated) DEVICE — T4 PULLOVER TOGA, X-LARGE

## (undated) DEVICE — LEX GENERAL ABDOMINAL SPLIT: Brand: MEDLINE INDUSTRIES, INC.

## (undated) DEVICE — APPL CHLORAPREP W/TINT 26ML BLU

## (undated) DEVICE — SUT SILK 3/0 SH 30IN K832H

## (undated) DEVICE — DRAIN JACKSON PRATT ROUND 15FR: Brand: CARDINAL HEALTH

## (undated) DEVICE — ADAPT CLN LUM OLYMP AIR/H20

## (undated) DEVICE — ANTIBACTERIAL UNDYED BRAIDED (POLYGLACTIN 910), SYNTHETIC ABSORBABLE SUTURE: Brand: COATED VICRYL

## (undated) DEVICE — SYS SKIN CLS DERMABOND PRINEO W/22CM MESH TP

## (undated) DEVICE — SOLIDIFIER LIQ PREMISORB 1500CC

## (undated) DEVICE — STRYKER PERFORMANCE SERIES SAGITTAL BLADE: Brand: STRYKER PERFORMANCE SERIES

## (undated) DEVICE — ST LINER SAFECAP GRN RED CP STRL

## (undated) DEVICE — SOL IRR H2O BTL 1000ML STRL

## (undated) DEVICE — HYBRID CO2 TUBING/CAP SET FOR OLYMPUS® SCOPES & CO2 SOURCE: Brand: ERBE

## (undated) DEVICE — CONTN GRAD MEAS TRIANG 32OZ BLK

## (undated) DEVICE — PUMP PAIN AUTOFUSER AUTO SELCT NOBOLUS 1TO14ML/HR 550ML DISP

## (undated) DEVICE — LUBE JELLY FOIL PACK 1.4 OZ: Brand: MEDLINE INDUSTRIES, INC.

## (undated) DEVICE — SUT PROLN 0/0 CTX 30IN 8454H

## (undated) DEVICE — DRSNG PAD ABD 8X10IN STRL

## (undated) DEVICE — PK KN TOTL 10

## (undated) DEVICE — THE BITE BLOCK MAXI, LATEX FREE STRAP IS USED TO PROTECT THE ENDOSCOPE INSERTION TUBE FROM BEING BITTEN BY THE PATIENT.

## (undated) DEVICE — SYR LUERLOK 50ML

## (undated) DEVICE — TOTAL TRAY, 16FR 10ML SIL FOLEY, URN: Brand: MEDLINE

## (undated) DEVICE — KT ORCA ORCAPOD DISP STRL

## (undated) DEVICE — CLTH CLENS READYCLEANSE PERI CARE PK/5

## (undated) DEVICE — SINGLE-USE BIOPSY FORCEPS: Brand: RADIAL JAW 4

## (undated) DEVICE — GLV SURG SIGNATURE TOUCH PF LTX 8 STRL BX/50

## (undated) DEVICE — FIRST STEP BEDSIDE ADD WATER KIT - RESEALABLE STAND-UP POUCH, ENDOSCOPIC CLEANING PAD - 1 POUCH: Brand: FIRST STEP BEDSIDE ADD WATER KIT - RESEALABLE STAND-UP POUCH, ENDOSCOPIC CLEANIN

## (undated) DEVICE — GRADUATE CONTN 1000ML

## (undated) DEVICE — BNDR ABD PREMIUM/UNIV 10IN 27TO48IN

## (undated) DEVICE — GLV SURG SENSICARE PI ORTHO SZ7.5 LF STRL

## (undated) DEVICE — ELECTRD BLD EZ CLN MOD 4IN

## (undated) DEVICE — SUT SILK 3/0 TIES 18IN A184H

## (undated) DEVICE — DRSNG WND GZ PAD BORDERED 4X8IN STRL

## (undated) DEVICE — NDL SPINE 22G 31/2IN BLK